# Patient Record
Sex: FEMALE | Race: WHITE | NOT HISPANIC OR LATINO | Employment: UNEMPLOYED | URBAN - METROPOLITAN AREA
[De-identification: names, ages, dates, MRNs, and addresses within clinical notes are randomized per-mention and may not be internally consistent; named-entity substitution may affect disease eponyms.]

---

## 2023-03-06 ENCOUNTER — APPOINTMENT (OUTPATIENT)
Dept: CT IMAGING | Facility: HOSPITAL | Age: 28
End: 2023-03-06
Payer: MEDICAID

## 2023-03-06 ENCOUNTER — APPOINTMENT (OUTPATIENT)
Dept: GENERAL RADIOLOGY | Facility: HOSPITAL | Age: 28
End: 2023-03-06
Payer: MEDICAID

## 2023-03-06 ENCOUNTER — HOSPITAL ENCOUNTER (INPATIENT)
Facility: HOSPITAL | Age: 28
LOS: 1 days | Discharge: LEFT BEFORE DISCHARGE | End: 2023-03-07
Attending: EMERGENCY MEDICINE | Admitting: INTERNAL MEDICINE
Payer: MEDICAID

## 2023-03-06 DIAGNOSIS — B19.20 HEPATITIS C VIRUS INFECTION WITHOUT HEPATIC COMA, UNSPECIFIED CHRONICITY: ICD-10-CM

## 2023-03-06 DIAGNOSIS — E87.1 ACUTE HYPONATREMIA: ICD-10-CM

## 2023-03-06 DIAGNOSIS — F19.10 IV DRUG ABUSE: ICD-10-CM

## 2023-03-06 DIAGNOSIS — N30.01 ACUTE CYSTITIS WITH HEMATURIA: ICD-10-CM

## 2023-03-06 DIAGNOSIS — R41.82 ALTERED MENTAL STATUS, UNSPECIFIED ALTERED MENTAL STATUS TYPE: Primary | ICD-10-CM

## 2023-03-06 DIAGNOSIS — E83.42 HYPOMAGNESEMIA: ICD-10-CM

## 2023-03-06 DIAGNOSIS — A41.9 SEPSIS, DUE TO UNSPECIFIED ORGANISM, UNSPECIFIED WHETHER ACUTE ORGAN DYSFUNCTION PRESENT: ICD-10-CM

## 2023-03-06 PROBLEM — R65.20 SEVERE SEPSIS: Status: ACTIVE | Noted: 2023-03-06

## 2023-03-06 LAB
ACETONE BLD QL: NEGATIVE
ALBUMIN SERPL-MCNC: 3.5 G/DL (ref 3.5–5.2)
ALBUMIN/GLOB SERPL: 1 G/DL
ALP SERPL-CCNC: 135 U/L (ref 39–117)
ALT SERPL W P-5'-P-CCNC: 69 U/L (ref 1–33)
AMPHET+METHAMPHET UR QL: POSITIVE
ANION GAP SERPL CALCULATED.3IONS-SCNC: 12 MMOL/L (ref 5–15)
ANISOCYTOSIS BLD QL: ABNORMAL
ARTERIAL PATENCY WRIST A: POSITIVE
AST SERPL-CCNC: 120 U/L (ref 1–32)
ATMOSPHERIC PRESS: ABNORMAL MM[HG]
B PARAPERT DNA SPEC QL NAA+PROBE: NOT DETECTED
B PERT DNA SPEC QL NAA+PROBE: NOT DETECTED
B-HCG UR QL: NEGATIVE
BACTERIA BLD CULT: ABNORMAL
BACTERIA UR QL AUTO: ABNORMAL /HPF
BARBITURATES UR QL SCN: NEGATIVE
BASE EXCESS BLDA CALC-SCNC: -0.8 MMOL/L (ref 0–3)
BDY SITE: ABNORMAL
BENZODIAZ UR QL SCN: NEGATIVE
BILIRUB SERPL-MCNC: 0.9 MG/DL (ref 0–1.2)
BILIRUB UR QL STRIP: ABNORMAL
BOTTLE TYPE: ABNORMAL
BUN SERPL-MCNC: 20 MG/DL (ref 6–20)
BUN/CREAT SERPL: 17.5 (ref 7–25)
C PNEUM DNA NPH QL NAA+NON-PROBE: NOT DETECTED
CALCIUM SPEC-SCNC: 8.3 MG/DL (ref 8.6–10.5)
CANNABINOIDS SERPL QL: POSITIVE
CHLORIDE SERPL-SCNC: 91 MMOL/L (ref 98–107)
CLARITY UR: ABNORMAL
CO2 BLDA-SCNC: 24.2 MMOL/L (ref 22–29)
CO2 SERPL-SCNC: 25 MMOL/L (ref 22–29)
COCAINE UR QL: NEGATIVE
COLOR UR: ABNORMAL
CREAT SERPL-MCNC: 1.14 MG/DL (ref 0.57–1)
D-LACTATE SERPL-SCNC: 1.4 MMOL/L (ref 0.5–2)
D-LACTATE SERPL-SCNC: 2.1 MMOL/L (ref 0.3–2)
DEPRECATED RDW RBC AUTO: 49.4 FL (ref 37–54)
EGFRCR SERPLBLD CKD-EPI 2021: 67.8 ML/MIN/1.73
ERYTHROCYTE [DISTWIDTH] IN BLOOD BY AUTOMATED COUNT: 16.2 % (ref 12.3–15.4)
ETHANOL UR QL: <0.01 %
FLUAV SUBTYP SPEC NAA+PROBE: NOT DETECTED
FLUBV RNA ISLT QL NAA+PROBE: NOT DETECTED
GEN 5 2HR TROPONIN T REFLEX: 12 NG/L
GLOBULIN UR ELPH-MCNC: 3.5 GM/DL
GLUCOSE BLDC GLUCOMTR-MCNC: 111 MG/DL (ref 70–105)
GLUCOSE SERPL-MCNC: 111 MG/DL (ref 65–99)
GLUCOSE UR STRIP-MCNC: NEGATIVE MG/DL
GRAN CASTS URNS QL MICRO: ABNORMAL /LPF
HADV DNA SPEC NAA+PROBE: NOT DETECTED
HCO3 BLDA-SCNC: 23.1 MMOL/L (ref 21–28)
HCOV 229E RNA SPEC QL NAA+PROBE: NOT DETECTED
HCOV HKU1 RNA SPEC QL NAA+PROBE: NOT DETECTED
HCOV NL63 RNA SPEC QL NAA+PROBE: NOT DETECTED
HCOV OC43 RNA SPEC QL NAA+PROBE: NOT DETECTED
HCT VFR BLD AUTO: 43.5 % (ref 34–46.6)
HEMODILUTION: NO
HGB BLD-MCNC: 14.2 G/DL (ref 12–15.9)
HGB UR QL STRIP.AUTO: ABNORMAL
HMPV RNA NPH QL NAA+NON-PROBE: NOT DETECTED
HOLD SPECIMEN: NORMAL
HOLD SPECIMEN: NORMAL
HPIV1 RNA ISLT QL NAA+PROBE: NOT DETECTED
HPIV2 RNA SPEC QL NAA+PROBE: NOT DETECTED
HPIV3 RNA NPH QL NAA+PROBE: NOT DETECTED
HPIV4 P GENE NPH QL NAA+PROBE: NOT DETECTED
HYALINE CASTS UR QL AUTO: ABNORMAL /LPF
INHALED O2 CONCENTRATION: 21 %
KETONES UR QL STRIP: NEGATIVE
LEUKOCYTE ESTERASE UR QL STRIP.AUTO: ABNORMAL
LYMPHOCYTES # BLD MANUAL: 0.28 10*3/MM3 (ref 0.7–3.1)
M PNEUMO IGG SER IA-ACNC: NOT DETECTED
MAGNESIUM SERPL-MCNC: 1.3 MG/DL (ref 1.6–2.6)
MCH RBC QN AUTO: 29 PG (ref 26.6–33)
MCHC RBC AUTO-ENTMCNC: 32.7 G/DL (ref 31.5–35.7)
MCV RBC AUTO: 88.7 FL (ref 79–97)
METAMYELOCYTES NFR BLD MANUAL: 19 % (ref 0–0)
METHADONE UR QL SCN: NEGATIVE
MODALITY: ABNORMAL
MRSA DNA SPEC QL NAA+PROBE: ABNORMAL
NEUTROPHILS # BLD AUTO: 7.18 10*3/MM3 (ref 1.7–7)
NEUTROPHILS NFR BLD MANUAL: 51 % (ref 42.7–76)
NEUTS BAND NFR BLD MANUAL: 27 % (ref 0–5)
NEUTS VAC BLD QL SMEAR: ABNORMAL
NITRITE UR QL STRIP: POSITIVE
OPIATES UR QL: NEGATIVE
OXYCODONE UR QL SCN: NEGATIVE
PATHOLOGY REVIEW: YES
PCO2 BLDA: 35.2 MM HG (ref 35–48)
PH BLDA: 7.42 PH UNITS (ref 7.35–7.45)
PH UR STRIP.AUTO: 6 [PH] (ref 5–8)
PHOSPHATE SERPL-MCNC: 2.6 MG/DL (ref 2.5–4.5)
PLATELET # BLD AUTO: 93 10*3/MM3 (ref 140–450)
PMV BLD AUTO: 9.6 FL (ref 6–12)
PO2 BLDA: 21.4 MM HG (ref 83–108)
POTASSIUM SERPL-SCNC: 3.1 MMOL/L (ref 3.5–5.2)
PROCALCITONIN SERPL-MCNC: 4.81 NG/ML (ref 0–0.25)
PROCALCITONIN SERPL-MCNC: 6.33 NG/ML (ref 0–0.25)
PROT SERPL-MCNC: 7 G/DL (ref 6–8.5)
PROT UR QL STRIP: ABNORMAL
RBC # BLD AUTO: 4.91 10*6/MM3 (ref 3.77–5.28)
RBC # UR STRIP: ABNORMAL /HPF
REF LAB TEST METHOD: ABNORMAL
RHINOVIRUS RNA SPEC NAA+PROBE: NOT DETECTED
RSV RNA NPH QL NAA+NON-PROBE: NOT DETECTED
SAO2 % BLDCOA: 37.6 % (ref 94–98)
SARS-COV-2 RNA NPH QL NAA+NON-PROBE: NOT DETECTED
SCAN SLIDE: NORMAL
SMALL PLATELETS BLD QL SMEAR: ABNORMAL
SODIUM SERPL-SCNC: 128 MMOL/L (ref 136–145)
SP GR UR STRIP: 1.02 (ref 1–1.03)
SQUAMOUS #/AREA URNS HPF: ABNORMAL /HPF
TOXIC GRANULATION: ABNORMAL
TROPONIN T DELTA: -5 NG/L
TROPONIN T SERPL HS-MCNC: 17 NG/L
UROBILINOGEN UR QL STRIP: ABNORMAL
VARIANT LYMPHS NFR BLD MANUAL: 3 % (ref 19.6–45.3)
WBC # UR STRIP: ABNORMAL /HPF
WBC NRBC COR # BLD: 9.2 10*3/MM3 (ref 3.4–10.8)
WHOLE BLOOD HOLD COAG: NORMAL
WHOLE BLOOD HOLD SPECIMEN: NORMAL
YEAST URNS QL MICRO: ABNORMAL /HPF

## 2023-03-06 PROCEDURE — 80053 COMPREHEN METABOLIC PANEL: CPT | Performed by: NURSE PRACTITIONER

## 2023-03-06 PROCEDURE — 0 POTASSIUM CHLORIDE 10 MEQ/100ML SOLUTION: Performed by: NURSE PRACTITIONER

## 2023-03-06 PROCEDURE — 84484 ASSAY OF TROPONIN QUANT: CPT | Performed by: NURSE PRACTITIONER

## 2023-03-06 PROCEDURE — 99285 EMERGENCY DEPT VISIT HI MDM: CPT

## 2023-03-06 PROCEDURE — 85007 BL SMEAR W/DIFF WBC COUNT: CPT | Performed by: NURSE PRACTITIONER

## 2023-03-06 PROCEDURE — 85025 COMPLETE CBC W/AUTO DIFF WBC: CPT | Performed by: NURSE PRACTITIONER

## 2023-03-06 PROCEDURE — 70496 CT ANGIOGRAPHY HEAD: CPT

## 2023-03-06 PROCEDURE — 25010000002 CEFEPIME PER 500 MG: Performed by: INTERNAL MEDICINE

## 2023-03-06 PROCEDURE — 81025 URINE PREGNANCY TEST: CPT | Performed by: NURSE PRACTITIONER

## 2023-03-06 PROCEDURE — 80307 DRUG TEST PRSMV CHEM ANLYZR: CPT | Performed by: NURSE PRACTITIONER

## 2023-03-06 PROCEDURE — 93005 ELECTROCARDIOGRAM TRACING: CPT | Performed by: NURSE PRACTITIONER

## 2023-03-06 PROCEDURE — 25010000002 CEFTRIAXONE PER 250 MG: Performed by: NURSE PRACTITIONER

## 2023-03-06 PROCEDURE — 74177 CT ABD & PELVIS W/CONTRAST: CPT

## 2023-03-06 PROCEDURE — 25010000002 MIDAZOLAM PER 1 MG: Performed by: NURSE PRACTITIONER

## 2023-03-06 PROCEDURE — 25510000001 IOPAMIDOL PER 1 ML: Performed by: EMERGENCY MEDICINE

## 2023-03-06 PROCEDURE — 25010000002 VANCOMYCIN HCL 1.25 G RECONSTITUTED SOLUTION 1 EACH VIAL: Performed by: NURSE PRACTITIONER

## 2023-03-06 PROCEDURE — 25010000002 ENOXAPARIN PER 10 MG: Performed by: INTERNAL MEDICINE

## 2023-03-06 PROCEDURE — 71045 X-RAY EXAM CHEST 1 VIEW: CPT

## 2023-03-06 PROCEDURE — P9612 CATHETERIZE FOR URINE SPEC: HCPCS

## 2023-03-06 PROCEDURE — 83735 ASSAY OF MAGNESIUM: CPT | Performed by: NURSE PRACTITIONER

## 2023-03-06 PROCEDURE — 82009 KETONE BODYS QUAL: CPT | Performed by: NURSE PRACTITIONER

## 2023-03-06 PROCEDURE — 84145 PROCALCITONIN (PCT): CPT | Performed by: NURSE PRACTITIONER

## 2023-03-06 PROCEDURE — 87086 URINE CULTURE/COLONY COUNT: CPT | Performed by: NURSE PRACTITIONER

## 2023-03-06 PROCEDURE — 83605 ASSAY OF LACTIC ACID: CPT

## 2023-03-06 PROCEDURE — 36600 WITHDRAWAL OF ARTERIAL BLOOD: CPT

## 2023-03-06 PROCEDURE — 84145 PROCALCITONIN (PCT): CPT | Performed by: INTERNAL MEDICINE

## 2023-03-06 PROCEDURE — 87147 CULTURE TYPE IMMUNOLOGIC: CPT | Performed by: NURSE PRACTITIONER

## 2023-03-06 PROCEDURE — 87641 MR-STAPH DNA AMP PROBE: CPT | Performed by: INTERNAL MEDICINE

## 2023-03-06 PROCEDURE — 81001 URINALYSIS AUTO W/SCOPE: CPT | Performed by: NURSE PRACTITIONER

## 2023-03-06 PROCEDURE — 70470 CT HEAD/BRAIN W/O & W/DYE: CPT

## 2023-03-06 PROCEDURE — 87077 CULTURE AEROBIC IDENTIFY: CPT | Performed by: NURSE PRACTITIONER

## 2023-03-06 PROCEDURE — 87186 SC STD MICRODIL/AGAR DIL: CPT | Performed by: NURSE PRACTITIONER

## 2023-03-06 PROCEDURE — 25010000002 MAGNESIUM SULFATE 2 GM/50ML SOLUTION: Performed by: NURSE PRACTITIONER

## 2023-03-06 PROCEDURE — 84100 ASSAY OF PHOSPHORUS: CPT | Performed by: NURSE PRACTITIONER

## 2023-03-06 PROCEDURE — 87150 DNA/RNA AMPLIFIED PROBE: CPT | Performed by: NURSE PRACTITIONER

## 2023-03-06 PROCEDURE — 0202U NFCT DS 22 TRGT SARS-COV-2: CPT | Performed by: NURSE PRACTITIONER

## 2023-03-06 PROCEDURE — 82962 GLUCOSE BLOOD TEST: CPT

## 2023-03-06 PROCEDURE — 82077 ASSAY SPEC XCP UR&BREATH IA: CPT | Performed by: NURSE PRACTITIONER

## 2023-03-06 PROCEDURE — 82803 BLOOD GASES ANY COMBINATION: CPT

## 2023-03-06 RX ORDER — POTASSIUM CHLORIDE 7.45 MG/ML
10 INJECTION INTRAVENOUS
Status: DISCONTINUED | OUTPATIENT
Start: 2023-03-06 | End: 2023-03-07 | Stop reason: HOSPADM

## 2023-03-06 RX ORDER — MAGNESIUM SULFATE HEPTAHYDRATE 40 MG/ML
2 INJECTION, SOLUTION INTRAVENOUS AS NEEDED
Status: DISCONTINUED | OUTPATIENT
Start: 2023-03-06 | End: 2023-03-07 | Stop reason: HOSPADM

## 2023-03-06 RX ORDER — ACETAMINOPHEN 650 MG/1
650 SUPPOSITORY RECTAL EVERY 4 HOURS PRN
Status: DISCONTINUED | OUTPATIENT
Start: 2023-03-06 | End: 2023-03-07 | Stop reason: HOSPADM

## 2023-03-06 RX ORDER — ONDANSETRON 4 MG/1
4 TABLET, FILM COATED ORAL EVERY 6 HOURS PRN
Status: DISCONTINUED | OUTPATIENT
Start: 2023-03-06 | End: 2023-03-07 | Stop reason: HOSPADM

## 2023-03-06 RX ORDER — ACETAMINOPHEN 160 MG/5ML
650 SOLUTION ORAL EVERY 4 HOURS PRN
Status: DISCONTINUED | OUTPATIENT
Start: 2023-03-06 | End: 2023-03-07 | Stop reason: HOSPADM

## 2023-03-06 RX ORDER — ONDANSETRON 2 MG/ML
4 INJECTION INTRAMUSCULAR; INTRAVENOUS EVERY 6 HOURS PRN
Status: DISCONTINUED | OUTPATIENT
Start: 2023-03-06 | End: 2023-03-07 | Stop reason: HOSPADM

## 2023-03-06 RX ORDER — MAGNESIUM SULFATE HEPTAHYDRATE 40 MG/ML
4 INJECTION, SOLUTION INTRAVENOUS AS NEEDED
Status: DISCONTINUED | OUTPATIENT
Start: 2023-03-06 | End: 2023-03-07 | Stop reason: HOSPADM

## 2023-03-06 RX ORDER — POLYETHYLENE GLYCOL 3350 17 G/17G
17 POWDER, FOR SOLUTION ORAL DAILY PRN
Status: DISCONTINUED | OUTPATIENT
Start: 2023-03-06 | End: 2023-03-07 | Stop reason: HOSPADM

## 2023-03-06 RX ORDER — BISACODYL 10 MG
10 SUPPOSITORY, RECTAL RECTAL DAILY PRN
Status: DISCONTINUED | OUTPATIENT
Start: 2023-03-06 | End: 2023-03-07 | Stop reason: HOSPADM

## 2023-03-06 RX ORDER — SODIUM CHLORIDE 0.9 % (FLUSH) 0.9 %
10 SYRINGE (ML) INJECTION AS NEEDED
Status: DISCONTINUED | OUTPATIENT
Start: 2023-03-06 | End: 2023-03-07 | Stop reason: HOSPADM

## 2023-03-06 RX ORDER — ACETAMINOPHEN 650 MG/1
975 SUPPOSITORY RECTAL ONCE
Status: COMPLETED | OUTPATIENT
Start: 2023-03-06 | End: 2023-03-06

## 2023-03-06 RX ORDER — SODIUM CHLORIDE 0.9 % (FLUSH) 0.9 %
10 SYRINGE (ML) INJECTION EVERY 12 HOURS SCHEDULED
Status: DISCONTINUED | OUTPATIENT
Start: 2023-03-06 | End: 2023-03-07 | Stop reason: HOSPADM

## 2023-03-06 RX ORDER — SODIUM CHLORIDE 9 MG/ML
40 INJECTION, SOLUTION INTRAVENOUS AS NEEDED
Status: DISCONTINUED | OUTPATIENT
Start: 2023-03-06 | End: 2023-03-07 | Stop reason: HOSPADM

## 2023-03-06 RX ORDER — BISACODYL 5 MG/1
5 TABLET, DELAYED RELEASE ORAL DAILY PRN
Status: DISCONTINUED | OUTPATIENT
Start: 2023-03-06 | End: 2023-03-07 | Stop reason: HOSPADM

## 2023-03-06 RX ORDER — AMOXICILLIN 250 MG
2 CAPSULE ORAL 2 TIMES DAILY
Status: DISCONTINUED | OUTPATIENT
Start: 2023-03-06 | End: 2023-03-07 | Stop reason: HOSPADM

## 2023-03-06 RX ORDER — ENOXAPARIN SODIUM 100 MG/ML
40 INJECTION SUBCUTANEOUS DAILY
Status: DISCONTINUED | OUTPATIENT
Start: 2023-03-06 | End: 2023-03-07 | Stop reason: HOSPADM

## 2023-03-06 RX ORDER — MIDAZOLAM HYDROCHLORIDE 1 MG/ML
2 INJECTION INTRAMUSCULAR; INTRAVENOUS ONCE
Status: COMPLETED | OUTPATIENT
Start: 2023-03-06 | End: 2023-03-06

## 2023-03-06 RX ORDER — CHOLECALCIFEROL (VITAMIN D3) 125 MCG
5 CAPSULE ORAL NIGHTLY PRN
Status: DISCONTINUED | OUTPATIENT
Start: 2023-03-06 | End: 2023-03-07 | Stop reason: HOSPADM

## 2023-03-06 RX ORDER — ACETAMINOPHEN 325 MG/1
650 TABLET ORAL EVERY 4 HOURS PRN
Status: DISCONTINUED | OUTPATIENT
Start: 2023-03-06 | End: 2023-03-07 | Stop reason: HOSPADM

## 2023-03-06 RX ADMIN — ENOXAPARIN SODIUM 40 MG: 100 INJECTION SUBCUTANEOUS at 21:29

## 2023-03-06 RX ADMIN — SODIUM CHLORIDE 1000 ML: 9 INJECTION, SOLUTION INTRAVENOUS at 18:02

## 2023-03-06 RX ADMIN — POTASSIUM CHLORIDE 10 MEQ: 7.46 INJECTION, SOLUTION INTRAVENOUS at 19:30

## 2023-03-06 RX ADMIN — SODIUM CHLORIDE 1000 ML: 9 INJECTION, SOLUTION INTRAVENOUS at 12:13

## 2023-03-06 RX ADMIN — CEFEPIME 2 G: 2 INJECTION, POWDER, FOR SOLUTION INTRAVENOUS at 21:29

## 2023-03-06 RX ADMIN — ACETAMINOPHEN 975 MG: 650 SUPPOSITORY RECTAL at 15:51

## 2023-03-06 RX ADMIN — VANCOMYCIN HYDROCHLORIDE 1250 MG: 1.25 INJECTION, POWDER, LYOPHILIZED, FOR SOLUTION INTRAVENOUS at 16:58

## 2023-03-06 RX ADMIN — MIDAZOLAM 2 MG: 1 INJECTION INTRAMUSCULAR; INTRAVENOUS at 14:23

## 2023-03-06 RX ADMIN — POTASSIUM CHLORIDE 10 MEQ: 7.46 INJECTION, SOLUTION INTRAVENOUS at 15:16

## 2023-03-06 RX ADMIN — CEFTRIAXONE 1 G: 1 INJECTION, POWDER, FOR SOLUTION INTRAMUSCULAR; INTRAVENOUS at 14:41

## 2023-03-06 RX ADMIN — MAGNESIUM SULFATE HEPTAHYDRATE 2 G: 2 INJECTION, SOLUTION INTRAVENOUS at 15:16

## 2023-03-06 RX ADMIN — MAGNESIUM SULFATE HEPTAHYDRATE 2 G: 2 INJECTION, SOLUTION INTRAVENOUS at 19:30

## 2023-03-06 RX ADMIN — Medication 10 ML: at 21:29

## 2023-03-06 RX ADMIN — SODIUM CHLORIDE, POTASSIUM CHLORIDE, SODIUM LACTATE AND CALCIUM CHLORIDE 1000 ML: 600; 310; 30; 20 INJECTION, SOLUTION INTRAVENOUS at 15:24

## 2023-03-06 RX ADMIN — IOPAMIDOL 100 ML: 755 INJECTION, SOLUTION INTRAVENOUS at 14:47

## 2023-03-06 NOTE — ED PROVIDER NOTES
Subjective   History of Present Illness  27-year-old female presents with 2-day history of nausea vomiting diarrhea.  She reports she is a type I diabetic.  She reports compliance with her insulin.  Patient is uncooperative, tearful.     History provided by:  Relative (Cousin at bedside)  History limited by:  Mental status change   used: No        Review of Systems    Past Medical History:   Diagnosis Date   • ADHD    • Anxiety    • Asymptomatic bacteriuria    • Bipolar 1 disorder (HCC)    • Candida esophagitis (HCC)    • Depression    • Endocarditis    • Hepatitis C    • Hypoalbuminemia    • Hypomagnesemia    • IV drug user    • Mycotic aneurysm (HCC)    • Normocytic anemia     secondary to chronic inflammation   • Thrombocytopenia (HCC)     at [facility] - resolved       No Known Allergies    No past surgical history on file.    No family history on file.    Social History     Socioeconomic History   • Marital status: Single           Objective   Physical Exam  Vitals and nursing note reviewed.   Constitutional:       General: She is awake. She is not in acute distress.     Appearance: Normal appearance. She is well-developed. She is obese. She is ill-appearing. She is not toxic-appearing or diaphoretic.   HENT:      Head: Normocephalic and atraumatic.      Nose: Nose normal. No rhinorrhea.      Mouth/Throat:      Mouth: Mucous membranes are moist.      Pharynx: Oropharynx is clear.   Eyes:      Extraocular Movements: Extraocular movements intact.      Conjunctiva/sclera: Conjunctivae normal.      Pupils: Pupils are equal, round, and reactive to light.   Cardiovascular:      Rate and Rhythm: Regular rhythm. Tachycardia present.      Pulses: Normal pulses.      Heart sounds: Normal heart sounds, S1 normal and S2 normal. Heart sounds not distant. No murmur heard.    No friction rub. No gallop.   Pulmonary:      Effort: Pulmonary effort is normal. No respiratory distress.      Breath sounds: Normal  "breath sounds and air entry. No wheezing or rales.   Abdominal:      General: Abdomen is flat. Bowel sounds are normal. There is no distension.      Palpations: Abdomen is soft. Abdomen is not rigid. There is no mass.      Tenderness: There is generalized abdominal tenderness. There is guarding. There is no rebound. Negative signs include Wyman's sign and McBurney's sign.      Hernia: No hernia is present.   Skin:     General: Skin is warm and dry.      Capillary Refill: Capillary refill takes less than 2 seconds.      Coloration: Skin is not jaundiced or pale.      Findings: Lesion (to the left of the umbilicus) present. No bruising, erythema or rash.   Neurological:      Mental Status: She is alert. She is disoriented.      GCS: GCS eye subscore is 4. GCS verbal subscore is 4. GCS motor subscore is 5.      Motor: Motor function is intact.      Comments: GCS: 13    Patient is able to verbalize her name and date of birth, unable to determine place and time.   Psychiatric:         Attention and Perception: She is inattentive.         Mood and Affect: Mood is anxious. Affect is tearful and inappropriate (Stating \"I want my mommy\").         Behavior: Behavior is uncooperative, agitated and withdrawn.         Thought Content: Thought content normal.         Cognition and Memory: Cognition is impaired. Memory is impaired.         Judgment: Judgment is impulsive.         Procedures     My EKG interpretation: Sinus tachycardia with ventricular trigeminy RVH secondary to repolarization abnormality rate 126.  No previous available for comparison.  Reviewed by my attending, Dr. Fu.      ED Course  ED Course as of 03/06/23 1741   Mon Mar 06, 2023   1551 Awaiting CTA results. [AL]      ED Course User Index  [AL] Madeline An, APRN                                           Medical Decision Making  Acute cystitis with hematuria: acute illness or injury  Acute hyponatremia: acute illness or injury  Altered mental status, " unspecified altered mental status type: acute illness or injury  Hepatitis C virus infection without hepatic coma, unspecified chronicity: chronic illness or injury  Hypomagnesemia: acute illness or injury  IV drug abuse (HCC): acute illness or injury  Sepsis, due to unspecified organism, unspecified whether acute organ dysfunction present (HCC): acute illness or injury  Amount and/or Complexity of Data Reviewed  External Data Reviewed: notes.     Details: The following is from [facility] in [place]:    Patient had presented to the hospital for altered mental status.  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  She was then upgraded to ICU for hypoxia.  She had an angiogram which showed a distal MCA mycotic aneurysm.  CT of the chest abdomen pelvis showed nodular foci with cavitation representing septic emboli as well as splenomegaly with hypoattenuation possible infarct, renal septic emboli Silvio cystitis of the hip girdle musculature, pyomyositis, tenosynovitis of the iliopsoas on the right.  Transthoracic echo showed EF of 60 to 65% with a mitral valve mobile density suspicious for vegetation and tricuspid and pulmonary valves not visualized.  Is also noted to have moderate pulmonary hypertension WILD revealed mitral valve Kenvir with severe regurg.  There was a large mobile echodensity on the tricuspid valve with moderate tricuspid regurgitation.  She was found to have bilateral pleural effusions left greater than right concerning for empyema.  This was drained by IR.  She was found to have Candida esophagitis.    Patient had ID consult was started on oxacillin, fluconazole, azithromycin, and heparin.  Blood cultures were found to be positive for Staph aureus.  Labs: ordered. Decision-making details documented in ED Course.  Radiology: ordered. Decision-making details documented in ED Course.  ECG/medicine tests: ordered. Decision-making details  "documented in ED Course.      Risk  OTC drugs.  Prescription drug management.      Critical Care  Total time providing critical care: minutes (60 minutes)    Interpreted by radiologist as below:     CT Head With & Without Contrast    Result Date: 3/6/2023  Impression: 1. No acute intracranial findings. 2. Presumed embolization material within the high left frontal lobe cortical/subcortical distribution. Electronically Signed: Kate Maradiagaguido  3/6/2023 2:58 PM EST  Workstation ID: [id number]    CT Abdomen Pelvis With Contrast    Result Date: 3/6/2023  1. No obstructive uropathy or CT findings of pyelonephritis 2. Indeterminate low-attenuation foci in the dome of the liver. It's unclear if these structures are related to the portal triads and possibly represent peripheral portal vein thrombosis or dilated peripheral ducts. Contrast enhanced MRI may be clarifying Electronically Signed: Dakota Norris  3/6/2023 3:11 PM EST  Workstation ID: [id number]    XR Chest AP    Result Date: 3/6/2023  Impression: 1.Evidence for prior cardiothoracic surgery. 2.Slight prominence of pulmonary interstitial markings that could relate to shadows from pulmonary vessels accentuated secondary to technique. Electronically Signed: Steve Bhavani  3/6/2023 2:01 PM EST  Workstation ID: [id number]        /63   Pulse (!) 131   Temp (!) 103.1 °F (39.5 °C) (Rectal)   Resp 18   Ht 165.1 cm (65\")   Wt 62 kg (136 lb 11 oz)   LMP  (LMP Unknown)   SpO2 98%   BMI 22.75 kg/m²      Lab Results (last 24 hours)     Procedure Component Value Units Date/Time    POC Glucose Once [[id number]]  (Abnormal) Collected: 03/06/23 1146    Specimen: Blood Updated: 03/06/23 1148     Glucose 111 mg/dL      Comment: Serial Number: [id number]:  [id number]       CBC & Differential [[id number]]  (Abnormal) Collected: 03/06/23 1148    Specimen: Blood from Arm, Left Updated: 03/06/23 1249    Narrative:      The following orders were created for panel order CBC & " Differential.  Procedure                               Abnormality         Status                     ---------                               -----------         ------                     CBC Auto Differential[[id number]]        Abnormal            Final result               Scan Slide[[id number]]                                       Final result                 Please view results for these tests on the individual orders.    Comprehensive Metabolic Panel [[id number]]  (Abnormal) Collected: 03/06/23 1148    Specimen: Blood from Arm, Left Updated: 03/06/23 1224     Glucose 111 mg/dL      BUN 20 mg/dL      Creatinine 1.14 mg/dL      Sodium 128 mmol/L      Potassium 3.1 mmol/L      Chloride 91 mmol/L      CO2 25.0 mmol/L      Calcium 8.3 mg/dL      Total Protein 7.0 g/dL      Albumin 3.5 g/dL      ALT (SGPT) 69 U/L      AST (SGOT) 120 U/L      Alkaline Phosphatase 135 U/L      Total Bilirubin 0.9 mg/dL      Globulin 3.5 gm/dL      A/G Ratio 1.0 g/dL      BUN/Creatinine Ratio 17.5     Anion Gap 12.0 mmol/L      eGFR 67.8 mL/min/1.73     Narrative:      GFR Normal >60  Chronic Kidney Disease <60  Kidney Failure <15      Magnesium [[id number]]  (Abnormal) Collected: 03/06/23 1148    Specimen: Blood from Arm, Left Updated: 03/06/23 1224     Magnesium 1.3 mg/dL     Phosphorus [[id number]]  (Normal) Collected: 03/06/23 1148    Specimen: Blood from Arm, Left Updated: 03/06/23 1224     Phosphorus 2.6 mg/dL     Ketone Bodies, Serum (Not performed at [place]) [[id number]]  (Normal) Collected: 03/06/23 1148    Specimen: Blood from Arm, Left Updated: 03/06/23 1302    Narrative:      The following orders were created for panel order Ketone Bodies, Serum (Not performed at [place]).  Procedure                               Abnormality         Status                     ---------                               -----------         ------                     Acetone[[id number]]                      Normal              Final result                  Please view results for these tests on the individual orders.    CBC Auto Differential [[id number]]  (Abnormal) Collected: 03/06/23 1148    Specimen: Blood from Arm, Left Updated: 03/06/23 1249     WBC 9.20 10*3/mm3      RBC 4.91 10*6/mm3      Hemoglobin 14.2 g/dL      Hematocrit 43.5 %      MCV 88.7 fL      MCH 29.0 pg      MCHC 32.7 g/dL      RDW 16.2 %      RDW-SD 49.4 fl      MPV 9.6 fL      Platelets 93 10*3/mm3     Narrative:      The previously reported component NRBC is no longer being reported. Previous result was 0.1 /100 WBC (Reference Range: 0.0-0.2 /100 WBC) on 3/6/2023 at 1220 EST.    Acetone [[id number]]  (Normal) Collected: 03/06/23 1148    Specimen: Blood from Arm, Left Updated: 03/06/23 1302     Acetone Negative    High Sensitivity Troponin T [[id number]]  (Abnormal) Collected: 03/06/23 1148    Specimen: Blood from Arm, Left Updated: 03/06/23 1224     HS Troponin T 17 ng/L     Narrative:      High Sensitive Troponin T Reference Range:  <10.0 ng/L- Negative Female for AMI  <15.0 ng/L- Negative Male for AMI  >=10 - Abnormal Female indicating possible myocardial injury.  >=15 - Abnormal Male indicating possible myocardial injury.   Clinicians would have to utilize clinical acumen, EKG, Troponin, and serial changes to determine if it is an Acute Myocardial Infarction or myocardial injury due to an underlying chronic condition.         Procalcitonin [[id number]]  (Abnormal) Collected: 03/06/23 1148    Specimen: Blood from Arm, Left Updated: 03/06/23 1229     Procalcitonin 6.33 ng/mL     Narrative:      As a Marker for Sepsis (Non-Neonates):    1. <0.5 ng/mL represents a low risk of severe sepsis and/or septic shock.  2. >2 ng/mL represents a high risk of severe sepsis and/or septic shock.    As a Marker for Lower Respiratory Tract Infections that require antibiotic therapy:    PCT on Admission    Antibiotic Therapy       6-12 Hrs later    >0.5                Strongly Recommended  >0.25 - <0.5         "Recommended   0.1 - 0.25          Discouraged              Remeasure/reassess PCT  <0.1                Strongly Discouraged     Remeasure/reassess PCT    As 28 day mortality risk marker: \"Change in Procalcitonin Result\" (>80% or <=80%) if Day 0 (or Day 1) and Day 4 values are available. Refer to [email or web address]-[email or web address]    Change in PCT <=80%  A decrease of PCT levels below or equal to 80% defines a positive change in PCT test result representing a higher risk for 28-day all-cause mortality of patients diagnosed with severe sepsis for septic shock.    Change in PCT >80%  A decrease of PCT levels of more than 80% defines a negative change in PCT result representing a lower risk for 28-day all-cause mortality of patients diagnosed with severe sepsis or septic shock.       Blood Culture - Blood, Arm, Left [[id number]] Collected: 03/06/23 1148    Specimen: Blood from Arm, Left Updated: 03/06/23 1154    Scan Slide [[id number]] Collected: 03/06/23 1148    Specimen: Blood from Arm, Left Updated: 03/06/23 1249     Scan Slide --     Comment: See Manual Differential Results       Manual Differential [[id number]]  (Abnormal) Collected: 03/06/23 1148    Specimen: Blood from Arm, Left Updated: 03/06/23 1249     Neutrophil % 51.0 %      Lymphocyte % 3.0 %      Bands %  27.0 %      Metamyelocyte % 19.0 %      Neutrophils Absolute 7.18 10*3/mm3      Lymphocytes Absolute 0.28 10*3/mm3      Anisocytosis Slight/1+     Toxic Granulation Slight/1+     Vacuolated Neutrophils Slight/1+     Platelet Estimate Decreased    Path Consult Reflex [[id number]] Collected: 03/06/23 1148    Specimen: Blood from Arm, Left Updated: 03/06/23 1249     Pathology Review Yes    Respiratory Panel PCR w/COVID-19(SARS-CoV-2) JOSEPH/HALEY/TRISHA/PAD/COR/MAD/RYAN In-House, NP Swab in UTM/VTM, 3-4 HR TAT - Swab, Nasopharynx [[id number]]  (Normal) Collected: 03/06/23 1151    Specimen: Swab from Nasopharynx Updated: 03/06/23 1247     ADENOVIRUS, PCR Not Detected     " Coronavirus 229E Not Detected     Coronavirus HKU1 Not Detected     Coronavirus NL63 Not Detected     Coronavirus OC43 Not Detected     COVID19 Not Detected     Human Metapneumovirus Not Detected     Human Rhinovirus/Enterovirus Not Detected     Influenza A PCR Not Detected     Influenza B PCR Not Detected     Parainfluenza Virus 1 Not Detected     Parainfluenza Virus 2 Not Detected     Parainfluenza Virus 3 Not Detected     Parainfluenza Virus 4 Not Detected     RSV, PCR Not Detected     Bordetella pertussis pcr Not Detected     Bordetella parapertussis PCR Not Detected     Chlamydophila pneumoniae PCR Not Detected     Mycoplasma pneumo by PCR Not Detected    Narrative:      In the setting of a positive respiratory panel with a viral infection PLUS a negative procalcitonin without other underlying concern for bacterial infection, consider observing off antibiotics or discontinuation of antibiotics and continue supportive care. If the respiratory panel is positive for atypical bacterial infection (Bordetella pertussis, Chlamydophila pneumoniae, or Mycoplasma pneumoniae), consider antibiotic de-escalation to target atypical bacterial infection.    Pregnancy, Urine - Straight Cath [[id number]]  (Normal) Collected: 03/06/23 1213    Specimen: Urine from Straight Cath Updated: 03/06/23 1227     HCG, Urine QL Negative    Urinalysis With Microscopic If Indicated (No Culture) - Straight Cath [[id number]]  (Abnormal) Collected: 03/06/23 1214    Specimen: Urine from Straight Cath Updated: 03/06/23 1232     Color, UA Ignacia     Appearance, UA Cloudy     pH, UA 6.0     Specific Gravity, UA 1.020     Glucose, UA Negative     Ketones, UA Negative     Bilirubin, UA Small (1+)     Comment: Confirmation testing is unavailable.  A serum bilirubin is recommended for further assessment.        Blood, UA Large (3+)     Protein,  mg/dL (2+)     Leuk Esterase, UA Moderate (2+)     Nitrite, UA Positive     Urobilinogen, UA 1.0 E.U./dL     Urinalysis, Microscopic Only - Straight Cath [[id number]]  (Abnormal) Collected: 03/06/23 1214    Specimen: Urine from Straight Cath Updated: 03/06/23 1243     RBC, UA 31-50 /HPF      WBC, UA 6-12 /HPF      Bacteria, UA Trace /HPF      Squamous Epithelial Cells, UA 0-2 /HPF      Yeast, UA Moderate/2+ Budding Yeast /HPF      Hyaline Casts, UA None Seen /LPF      Granular Casts, UA 0-2 /LPF      Methodology Manual Light Microscopy    Urine Drug Screen - Urine, Clean Catch [[id number]]  (Abnormal) Collected: 03/06/23 1214    Specimen: Urine, Clean Catch Updated: 03/06/23 1419     Amphet/Methamphet, Screen Positive     Barbiturates Screen, Urine Negative     Benzodiazepine Screen, Urine Negative     Cocaine Screen, Urine Negative     Opiate Screen Negative     THC, Screen, Urine Positive     Methadone Screen, Urine Negative     Oxycodone Screen, Urine Negative    Narrative:      Negative Thresholds Per Drugs Screened:    Amphetamines                 500 ng/ml  Barbiturates                 200 ng/ml  Benzodiazepines              100 ng/ml  Cocaine                      300 ng/ml  Methadone                    300 ng/ml  Opiates                      300 ng/ml  Oxycodone                    100 ng/ml  THC                           50 ng/ml    The Normal Value for all drugs tested is negative. This report includes final unconfirmed screening results to be used for medical treatment purposes only. Unconfirmed results must not be used for non-medical purposes such as employment or legal testing. Clinical consideration should be applied to any drug of abuse test, particularly when unconfirmed results are used.          All urine drugs of abuse requests without chain of custody are for medical screening purposes only.  False positives are possible.      Urine Culture - Urine, Straight Cath [[id number]] Collected: 03/06/23 1214    Specimen: Urine from Straight Cath Updated: 03/06/23 1353    POC Lactate [[id number]]  (Abnormal)  Collected: 03/06/23 1225    Specimen: Blood Updated: 03/06/23 1227     Lactate 2.1 mmol/L      Comment: Serial Number: [id number]:  [id number]       Blood Culture - Blood, Arm, Left [[id number]] Collected: 03/06/23 1231    Specimen: Blood from Arm, Left Updated: 03/06/23 1233    Blood Gas, Arterial - [[id number]]  (Abnormal) Collected: 03/06/23 1231    Specimen: Arterial Blood Updated: 03/06/23 1535     Site Right Radial     Yair's Test Positive     pH, Arterial 7.425 pH units      pCO2, Arterial 35.2 mm Hg      pO2, Arterial 21.4 mm Hg      HCO3, Arterial 23.1 mmol/L      Base Excess, Arterial -0.8 mmol/L      Comment: Serial Number: [id number]:  [id number]        O2 Saturation, Arterial 37.6 %      CO2 Content 24.2 mmol/L      Barometric Pressure for Blood Gas --     Comment: N/A        Modality Room Air     FIO2 21 %      Hemodilution No    High Sensitivity Troponin T 2Hr [[id number]]  (Abnormal) Collected: 03/06/23 1358    Specimen: Blood Updated: 03/06/23 1424     HS Troponin T 12 ng/L      Troponin T Delta -5 ng/L     Narrative:      High Sensitive Troponin T Reference Range:  <10.0 ng/L- Negative Female for AMI  <15.0 ng/L- Negative Male for AMI  >=10 - Abnormal Female indicating possible myocardial injury.  >=15 - Abnormal Male indicating possible myocardial injury.   Clinicians would have to utilize clinical acumen, EKG, Troponin, and serial changes to determine if it is an Acute Myocardial Infarction or myocardial injury due to an underlying chronic condition.         Ethanol [[id number]] Collected: 03/06/23 1358    Specimen: Blood Updated: 03/06/23 1424     Ethanol % <0.010 %     Narrative:      Plasma Ethanol Clinical Symptoms:    ETOH (%)               Clinical Symptom  .01-.05              No apparent influence  .03-.12              Euphoria, Diminished judgment and attention   .09-.25              Impaired comprehension, Muscle incoordination  .18-.30              Confusion, Staggered gait, Slurred  speech  .25-.40              Markedly decreased response to stimuli, unable to stand or                        walk, vomitting, sleep or stupor  .35-.50              Comatose, Anesthesia, Subnormal body temperature        STAT Lactic Acid, Reflex [[id number]]  (Normal) Collected: 03/06/23 1554    Specimen: Blood Updated: 03/06/23 1615     Lactate 1.4 mmol/L            Medications   potassium chloride 10 mEq in 100 mL IVPB (0 mEq Intravenous Stopped 3/6/23 1617)   Magnesium Sulfate - Total Dose 10 grams - Magnesium 1 or Less ( Intravenous Not Given:  See Alt 3/6/23 1712)     Or   Magnesium Sulfate - Total Dose 6 grams - Magnesium 1.1 - 1.5 (0 g Intravenous Stopped 3/6/23 1712)     Or   Magnesium Sulfate - Total Dose 4 grams - Magnesium 1.6 - 1.9 ( Intravenous Not Given:  See Alt 3/6/23 1712)   sodium chloride 0.9 % bolus 1,000 mL (0 mL Intravenous Stopped 3/6/23 1404)   cefTRIAXone (ROCEPHIN) 1 g in sodium chloride 0.9 % 100 mL IVPB (0 g Intravenous Stopped 3/6/23 1511)   midazolam (VERSED) injection 2 mg (2 mg Intravenous Given 3/6/23 1423)   iopamidol (ISOVUE-370) 76 % injection 100 mL (100 mL Intravenous Given 3/6/23 1447)   Vancomycin HCl 1,250 mg in sodium chloride 0.9 % 250 mL IVPB (1,250 mg Intravenous New Bag 3/6/23 1658)   lactated ringers bolus 1,000 mL (0 mL Intravenous Stopped 3/6/23 1712)   acetaminophen (TYLENOL) suppository 975 mg (975 mg Rectal Given 3/6/23 1551)        Patient undressed and placed in gown for exam.  Appropriate PPE worn during patient exam.  Appropriate monitoring initiated. Patient is alert and oriented x1.  She is ill-appearing.  She is uncooperative and tearful.  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  S1-S2 heart sounds on exam.  Lungs are clear to auscultation.  Abdomen is soft, round, diffusely tender.  IV established and labs obtained.  Altered mental status protocol initiated.  Chest x-ray, CT of the abdomen pelvis with IV contrast obtained with the above findings.   After reviewing patient's hospital records from [place], it was found that she had history of mycotic aneurysm and consulted with radiologist, Dr. Guerrero who recommended CT of the head with and without IV contrast and CTA of the head.  See above findings.  White blood cell count 9200 no shift 27% bandemia platelets 93 hemoglobin 14.2 hematocrit 43.5.  Sodium 128 potassium 3.1 chloride 91 bicarb 25 BUN 20 creatinine 1.14 glucose 111 liver enzymes are elevated, ALT 69  bilirubin within normal.  Magnesium was 1.3.  Lactic 2.1 blood cultures pending.  Glucose was found to be 111 VBG: pH 7.45 PCO2 35.2 PO2 21.4 bicarb 23.  Troponin was 17, repeat was 12.  Ethanol within normal limits UDS was significant for methamphetamines and marijuana.  Urine was significant for small bilirubin large blood protein moderate leuk esterase positive nitrates 31-50 red cells 6-12 white blood cells trace bacteria respiratory COVID panel negative pregnancy negative.  Spoke with Dr. Beltrán who agrees patient should be admitted for treatment of sepsis and further evaluation.    I reviewed chart see above note review from [place], [place] my radiology interpretation CT of the head shows no intracranial hemorrhage, mass, shift.  CT of the abdomen pelvis shows no obstruction, ascites.  CTA of the head pending during admission chest x-ray shows no cardiomegaly, edema, infiltrates.  Prosthetic heart valves present. Differential Diagnoses, not all-inclusive and does not constitute entirety of all causes: DKA, sepsis, septic emboli, mycotic aneurysm, respiratory failure.  Sepsis confirmed by labs and vital signs.  Septic emboli will need further work-up, echo has been ordered but not performed.  Mycotic aneurysm was ruled out by CT head with and without.  Respiratory failure and DKA was ruled out by VBG.    Disposition/Treatment: Discussed results with patient, verbalized understanding. Discussed reasons to return to the ER, patient  verbalized understanding. Agreeable with plan of care. Patient was stable upon discharge.    Upon reassessment, patient is flesh tone warm and dry no acute distress noted.  Vital signs are stable.    Part of this note may be an electronic transcription/translation of spoken language to printed text using the Dragon Dictation System.         Final diagnoses:   Altered mental status, unspecified altered mental status type   Sepsis, due to unspecified organism, unspecified whether acute organ dysfunction present (HCC)   Acute cystitis with hematuria   Acute hyponatremia   Hypomagnesemia   IV drug abuse (HCC)   Hepatitis C virus infection without hepatic coma, unspecified chronicity       ED Disposition  ED Disposition     ED Disposition   Decision to Admit    Condition   --    Comment   Level of Care: Progressive Care [20]   Diagnosis: Severe sepsis (HCC) [[id number]]   Admitting Physician: DICKSON DELGADO [[id number]]   Attending Physician: DICKSON DELGADO [[id number]]   Isolate for COVID?: No [0]   Certification: I Certify That Inpatient Hospital Services Are Medically Necessary For Greater Than 2 Midnights               No follow-up provider specified.       Medication List      No changes were made to your prescriptions during this visit.          Madeline An, APRN  03/06/23 1747

## 2023-03-06 NOTE — H&P
[facility] Medicine Services  History & Physical    Patient Name: Ani Fields  : 1995  MRN: [id number]  Primary Care Physician:  Provider, No Known  Date of admission: 3/6/2023  Date and Time of Service: 23 . at 17:46 EST     Subjective      Chief Complaint: AMS, Malaise    History of Present Illness: Ani Fields is a 27 y.o. female who presented to [facility] on 3/6/2023 complaining of generalized malaise, chills, AMS, irritability. 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 Patient is currently septic, with low GCS and AMS. No acute complaints directly but brought in via her relatives. Her father, who she was not with until right now, also provides her history with recent surgeries, sepsis, long standing meth use. Agreeable with full care and inpatient management.     ER Course: meets sepsis criteria, unknown source on imaging, susp direct blood stream infection. 2D echo pending, ID consulted. Started IVF bolus and broad spectrum Abx.       Review of Systems   Constitutional: Positive for fever and malaise/fatigue. Negative for chills, weight gain and weight loss.   HENT: Negative for hearing loss, nosebleeds and sore throat.    Eyes: Negative for blurred vision, pain and redness.   Cardiovascular: Negative for chest pain, leg swelling, palpitations and syncope.   Respiratory: Negative for cough, shortness of breath and sputum production.    Endocrine: Negative for polyuria.   Skin: Negative for color change, rash and suspicious lesions.   Musculoskeletal: Positive for muscle weakness. Negative for back pain and joint pain.   Gastrointestinal: Negative for abdominal pain, anorexia, diarrhea, dysphagia, heartburn, melena, nausea and vomiting.   Genitourinary: Negative for dysuria, frequency and urgency.   Neurological: Positive for difficulty with concentration and weakness. Negative for dizziness and numbness.    Psychiatric/Behavioral: Positive for altered mental status. Negative for memory loss. The patient does not have insomnia and is not nervous/anxious.         Personal History     Past Medical History:   Diagnosis Date   • ADHD    • Anxiety    • Asymptomatic bacteriuria    • Bipolar 1 disorder (HCC)    • Candida esophagitis (HCC)    • Depression    • Endocarditis    • Hepatitis C    • Hypoalbuminemia    • Hypomagnesemia    • IV drug user    • Mycotic aneurysm (HCC)    • Normocytic anemia     secondary to chronic inflammation   • Thrombocytopenia (HCC)     at [facility] - resolved       No past surgical history on file.    Family History: family history is not on file. Otherwise pertinent FHx was reviewed and not pertinent to current issue.    Social History:      Home Medications:  Prior to Admission Medications     None            Allergies:  No Known Allergies    Objective      Vitals:   Temp:  [100.1 °F (37.8 °C)-101.5 °F (38.6 °C)] 101.5 °F (38.6 °C)  Heart Rate:  [114-136] 131  Resp:  [18-20] 18  BP: (110-128)/(59-75) 111/63    Physical Exam  Vitals and nursing note reviewed.   Constitutional:       General: She is not in acute distress.     Appearance: She is normal weight. She is ill-appearing.   HENT:      Head: Normocephalic and atraumatic.      Nose: Nose normal.      Mouth/Throat:      Mouth: Mucous membranes are dry.   Eyes:      Extraocular Movements: Extraocular movements intact.      Conjunctiva/sclera: Conjunctivae normal.      Pupils: Pupils are equal, round, and reactive to light.   Cardiovascular:      Rate and Rhythm: Regular rhythm. Tachycardia present.      Pulses: Normal pulses.      Heart sounds: Normal heart sounds.   Pulmonary:      Effort: Respiratory distress present.      Breath sounds: Normal breath sounds. No wheezing or rhonchi.      Comments: tachypnea  Abdominal:      General: Abdomen is flat. Bowel sounds are normal.      Palpations: Abdomen is soft.      Tenderness: There is no abdominal  tenderness. There is no guarding.   Musculoskeletal:         General: No swelling, tenderness or signs of injury. Normal range of motion.      Cervical back: Normal range of motion and neck supple.   Skin:     General: Skin is warm and dry.      Capillary Refill: Capillary refill takes less than 2 seconds.      Coloration: Skin is not jaundiced or pale.      Findings: No rash.   Neurological:      General: No focal deficit present.      Mental Status: She is alert. Mental status is at baseline. She is disoriented.      Motor: Weakness present.      Gait: Gait abnormal.      Comments: GCS 12  AOx 1-2  Strength 4-/5 B/L UE & LE     Psychiatric:         Mood and Affect: Mood normal.         Behavior: Behavior normal.         Judgment: Judgment normal.          Result Review    Result Review:  I have personally reviewed the results from the time of this admission to 3/6/2023 17:18 EST and agree with these findings:  [x]  Laboratory  [x]  Microbiology  [x]  Radiology  []  EKG/Telemetry   []  Cardiology/Vascular   []  Pathology  []  Old records  []  Other:  Most notable findings include: SIRS 4/4      Assessment & Plan        Active Hospital Problems:  Active Hospital Problems    Diagnosis    • **Severe sepsis (HCC)      Plan:   Severe Sepsis  - SIRS 4/4  - Started on IV Cefepime and Vanc  - Hx of Endocarditis with implanted pacer  - Procal 6.33    TME  - 2/2 sepsis, dehydration  - IVF bolus given, start LR at 125ml/hr if not drinking on her own    TYLOR  - slight hyponatremia from this  - IVF bolus as above    UTI  - potentially from dehydration  - IVF as above    Hep C, chronic  - Elevated LFTs, monitor  - Consider flare or superinfection  - ID consulted    IVDU  - meth and THC + on UDS  - Monitor for withdrawal    DVT prophylaxis:  No DVT prophylaxis order currently exists.    CODE STATUS:    Level Of Support Discussed With: Patient  Code Status (Patient has no pulse and is not breathing): CPR (Attempt to  Resuscitate)  Medical Interventions (Patient has pulse or is breathing): Full Support    Admission Status:  I believe this patient meets inpatient status.    I discussed the patient's findings and my recommendations with patient and family.    This patient has been examined wearing appropriate Personal Protective Equipment and discussed with Nurse. 03/06/23      Signature: Electronically signed by Mer Srivastava DO, 03/06/23, 17:18 EST.  [facility]

## 2023-03-06 NOTE — CASE MANAGEMENT/SOCIAL WORK
Continued Stay Note  [facility] Davey     Patient Name: Ani Fields  MRN: [id number]  Today's Date: 3/6/2023    Admit Date: 3/6/2023    Plan: Need CM Assessment   Discharge Plan     Row Name 03/06/23 1710       Plan    Plan Need CM Assessment    Plan Comments Attmepted to see for CM assessment and ptient sleeping                Wanda Winter RN

## 2023-03-06 NOTE — ED NOTES
Patient was found in the [address] with IV pulled out. Nurse and tech was able to get patient back to bed and a new IV started. Dad is now at bedside.

## 2023-03-06 NOTE — ED NOTES
This RN spoke to pt's mother. Mom states pt just moved to [place] from [place] in January, to live with her father. Mother was able to provide hospital names to obtain medical records. [facility] Unit sect obtaining records at this time.

## 2023-03-07 VITALS
BODY MASS INDEX: 22.77 KG/M2 | TEMPERATURE: 97.9 F | HEIGHT: 65 IN | OXYGEN SATURATION: 95 % | SYSTOLIC BLOOD PRESSURE: 81 MMHG | DIASTOLIC BLOOD PRESSURE: 53 MMHG | WEIGHT: 136.69 LBS | HEART RATE: 81 BPM | RESPIRATION RATE: 24 BRPM

## 2023-03-07 LAB
ANION GAP SERPL CALCULATED.3IONS-SCNC: 9 MMOL/L (ref 5–15)
BASOPHILS # BLD AUTO: 0 10*3/MM3 (ref 0–0.2)
BASOPHILS NFR BLD AUTO: 0.5 % (ref 0–1.5)
BUN SERPL-MCNC: 17 MG/DL (ref 6–20)
BUN/CREAT SERPL: 23.6 (ref 7–25)
CALCIUM SPEC-SCNC: 7.4 MG/DL (ref 8.6–10.5)
CHLORIDE SERPL-SCNC: 106 MMOL/L (ref 98–107)
CO2 SERPL-SCNC: 18 MMOL/L (ref 22–29)
CREAT SERPL-MCNC: 0.72 MG/DL (ref 0.57–1)
D-LACTATE SERPL-SCNC: 1.5 MMOL/L (ref 0.5–2)
DEPRECATED RDW RBC AUTO: 48.1 FL (ref 37–54)
EGFRCR SERPLBLD CKD-EPI 2021: 117.7 ML/MIN/1.73
EOSINOPHIL # BLD AUTO: 0.1 10*3/MM3 (ref 0–0.4)
EOSINOPHIL NFR BLD AUTO: 0.8 % (ref 0.3–6.2)
ERYTHROCYTE [DISTWIDTH] IN BLOOD BY AUTOMATED COUNT: 15.8 % (ref 12.3–15.4)
GLUCOSE BLDC GLUCOMTR-MCNC: 86 MG/DL (ref 70–105)
GLUCOSE SERPL-MCNC: 114 MG/DL (ref 65–99)
HAV IGM SERPL QL IA: ABNORMAL
HBV CORE IGM SERPL QL IA: ABNORMAL
HBV SURFACE AG SERPL QL IA: ABNORMAL
HCT VFR BLD AUTO: 36.1 % (ref 34–46.6)
HCV AB SER DONR QL: REACTIVE
HGB BLD-MCNC: 11.5 G/DL (ref 12–15.9)
HIV1+2 AB SER QL: NORMAL
LAB AP CASE REPORT: NORMAL
LYMPHOCYTES # BLD AUTO: 0.4 10*3/MM3 (ref 0.7–3.1)
LYMPHOCYTES NFR BLD AUTO: 5.4 % (ref 19.6–45.3)
MAGNESIUM SERPL-MCNC: 3.2 MG/DL (ref 1.6–2.6)
MCH RBC QN AUTO: 28.3 PG (ref 26.6–33)
MCHC RBC AUTO-ENTMCNC: 31.9 G/DL (ref 31.5–35.7)
MCV RBC AUTO: 88.6 FL (ref 79–97)
MONOCYTES # BLD AUTO: 0.4 10*3/MM3 (ref 0.1–0.9)
MONOCYTES NFR BLD AUTO: 5.8 % (ref 5–12)
NEUTROPHILS NFR BLD AUTO: 5.9 10*3/MM3 (ref 1.7–7)
NEUTROPHILS NFR BLD AUTO: 87.5 % (ref 42.7–76)
NRBC BLD AUTO-RTO: 0 /100 WBC (ref 0–0.2)
PATH REPORT.FINAL DX SPEC: NORMAL
PLATELET # BLD AUTO: 57 10*3/MM3 (ref 140–450)
PMV BLD AUTO: 11 FL (ref 6–12)
POTASSIUM SERPL-SCNC: 3.4 MMOL/L (ref 3.5–5.2)
QT INTERVAL: 398 MS
RBC # BLD AUTO: 4.07 10*6/MM3 (ref 3.77–5.28)
SODIUM SERPL-SCNC: 133 MMOL/L (ref 136–145)
TROPONIN T SERPL HS-MCNC: 13 NG/L
WBC NRBC COR # BLD: 6.7 10*3/MM3 (ref 3.4–10.8)
WHOLE BLOOD HOLD SPECIMEN: NORMAL

## 2023-03-07 PROCEDURE — 93005 ELECTROCARDIOGRAM TRACING: CPT | Performed by: INTERNAL MEDICINE

## 2023-03-07 PROCEDURE — 0 POTASSIUM CHLORIDE 10 MEQ/100ML SOLUTION: Performed by: INTERNAL MEDICINE

## 2023-03-07 PROCEDURE — 63710000001 ONDANSETRON PER 8 MG: Performed by: INTERNAL MEDICINE

## 2023-03-07 PROCEDURE — 82962 GLUCOSE BLOOD TEST: CPT

## 2023-03-07 PROCEDURE — 36415 COLL VENOUS BLD VENIPUNCTURE: CPT | Performed by: INTERNAL MEDICINE

## 2023-03-07 PROCEDURE — 25010000002 CEFEPIME PER 500 MG: Performed by: INTERNAL MEDICINE

## 2023-03-07 PROCEDURE — 84484 ASSAY OF TROPONIN QUANT: CPT

## 2023-03-07 PROCEDURE — 25010000002 KETOROLAC TROMETHAMINE PER 15 MG

## 2023-03-07 PROCEDURE — 85025 COMPLETE CBC W/AUTO DIFF WBC: CPT | Performed by: INTERNAL MEDICINE

## 2023-03-07 PROCEDURE — 25010000002 VANCOMYCIN 1 G RECONSTITUTED SOLUTION 1 EACH VIAL: Performed by: INTERNAL MEDICINE

## 2023-03-07 PROCEDURE — 87040 BLOOD CULTURE FOR BACTERIA: CPT | Performed by: INTERNAL MEDICINE

## 2023-03-07 PROCEDURE — 80048 BASIC METABOLIC PNL TOTAL CA: CPT | Performed by: INTERNAL MEDICINE

## 2023-03-07 PROCEDURE — 25010000002 ONDANSETRON PER 1 MG: Performed by: INTERNAL MEDICINE

## 2023-03-07 PROCEDURE — 83605 ASSAY OF LACTIC ACID: CPT | Performed by: INTERNAL MEDICINE

## 2023-03-07 PROCEDURE — 25010000002 MAGNESIUM SULFATE 2 GM/50ML SOLUTION: Performed by: INTERNAL MEDICINE

## 2023-03-07 PROCEDURE — 87522 HEPATITIS C REVRS TRNSCRPJ: CPT | Performed by: INTERNAL MEDICINE

## 2023-03-07 PROCEDURE — 83735 ASSAY OF MAGNESIUM: CPT | Performed by: INTERNAL MEDICINE

## 2023-03-07 PROCEDURE — G0432 EIA HIV-1/HIV-2 SCREEN: HCPCS | Performed by: INTERNAL MEDICINE

## 2023-03-07 PROCEDURE — 80074 ACUTE HEPATITIS PANEL: CPT | Performed by: INTERNAL MEDICINE

## 2023-03-07 PROCEDURE — 25010000002 KETOROLAC TROMETHAMINE PER 15 MG: Performed by: INTERNAL MEDICINE

## 2023-03-07 RX ORDER — KETOROLAC TROMETHAMINE 30 MG/ML
30 INJECTION, SOLUTION INTRAMUSCULAR; INTRAVENOUS EVERY 6 HOURS PRN
Status: DISCONTINUED | OUTPATIENT
Start: 2023-03-07 | End: 2023-03-07 | Stop reason: HOSPADM

## 2023-03-07 RX ORDER — KETOROLAC TROMETHAMINE 15 MG/ML
15 INJECTION, SOLUTION INTRAMUSCULAR; INTRAVENOUS ONCE
Status: COMPLETED | OUTPATIENT
Start: 2023-03-07 | End: 2023-03-07

## 2023-03-07 RX ADMIN — SODIUM CHLORIDE 2000 ML: 9 INJECTION, SOLUTION INTRAVENOUS at 07:56

## 2023-03-07 RX ADMIN — MAGNESIUM SULFATE HEPTAHYDRATE 2 G: 2 INJECTION, SOLUTION INTRAVENOUS at 01:17

## 2023-03-07 RX ADMIN — CEFEPIME 2 G: 2 INJECTION, POWDER, FOR SOLUTION INTRAVENOUS at 06:03

## 2023-03-07 RX ADMIN — ONDANSETRON 4 MG: 2 INJECTION INTRAMUSCULAR; INTRAVENOUS at 15:40

## 2023-03-07 RX ADMIN — KETOROLAC TROMETHAMINE 15 MG: 15 INJECTION, SOLUTION INTRAMUSCULAR; INTRAVENOUS at 04:07

## 2023-03-07 RX ADMIN — SODIUM CHLORIDE 500 ML: 9 INJECTION, SOLUTION INTRAVENOUS at 05:03

## 2023-03-07 RX ADMIN — VANCOMYCIN HYDROCHLORIDE 1000 MG: 1 INJECTION, POWDER, LYOPHILIZED, FOR SOLUTION INTRAVENOUS at 14:37

## 2023-03-07 RX ADMIN — ACETAMINOPHEN 650 MG: 325 TABLET, FILM COATED ORAL at 02:46

## 2023-03-07 RX ADMIN — ONDANSETRON HYDROCHLORIDE 4 MG: 4 TABLET, FILM COATED ORAL at 04:30

## 2023-03-07 RX ADMIN — POTASSIUM CHLORIDE 10 MEQ: 7.46 INJECTION, SOLUTION INTRAVENOUS at 02:58

## 2023-03-07 RX ADMIN — POTASSIUM CHLORIDE 10 MEQ: 7.46 INJECTION, SOLUTION INTRAVENOUS at 01:17

## 2023-03-07 RX ADMIN — KETOROLAC TROMETHAMINE 30 MG: 30 INJECTION, SOLUTION INTRAMUSCULAR at 15:39

## 2023-03-07 NOTE — CASE MANAGEMENT/SOCIAL WORK
Social Work Assessment   [facility]     Patient Name: Ani Fields  MRN: [id number]  Today's Date: 3/7/2023    Admit Date: 3/6/2023     Substance Abuse     Row Name 03/07/23 1545       Substance Use    Substance Use Comment SW attempted to meet with pt at bedside to discuss positive drug screen and provide resources and/or treatment options.  Pt was on the phone and crying.  Pt asked SW to come back at a later time.              Met with patient in room wearing PPE: mask.    Maintained distance greater than six feet and spent less than 15 minutes in the room.    Susanne Trevino LCSW    Office: [phone]  Fax: [phone]  [email or web address]

## 2023-03-07 NOTE — NURSING NOTE
RN at patients bedside giving her IV Toradol and IV Zofran for pain and nausea, patient on the phone with her mom and screaming and cussing this RN out because she had to wait so long for a cup of ice water.  Patient verbally stated she wanted to leave to go to another hospital that was going to be able to get her water and nausea medication quicker than we are able to.  Due to patient screaming so loud security was called to room and patient said she was going to just leave and called her dad screaming and cussing into phone and her iv was removed per RN and patient was escorted out by 3 security officers.  RN educated her and told her by leaving AMA she could die from her infection and she said she is going straight to another hospital that will take care of her needs and she is going to fior this hospital.

## 2023-03-07 NOTE — PROGRESS NOTES
"Pharmacy Antimicrobial Dosing Service    Subjective:  Ani Fields is a 27 y.o.female admitted with sepsis. Pharmacy has been consulted to dose Vancomycin.    PMH:        Assessment/Plan    1. Day #1 Vancomycin: Goal -600 mcg*h/mL  Vancomycin  1.25g x1 dose (given at 1658 on 3/6)  Vancomycin  750mg IV q12h (predicted AUC  488 mcg*h/mL)  Vancomycin trough: due 1600 on 3/8      Pharmacy to monitor drug levels, renal function, cultures and sensitivities, and patient's clinical progress.       Objective:  Relevant clinical data and objective history reviewed:  165.1 cm (65\")   62 kg (136 lb 11 oz)   Ideal body weight: 57 kg (125 lb 10.6 oz)  Adjusted ideal body weight: 59 kg (130 lb 1.1 oz)  Body mass index is 22.75 kg/m².        Results from last 7 days   Lab Units 03/06/23  1148   CREATININE mg/dL 1.14*     Estimated Creatinine Clearance: 72.6 mL/min (A) (by C-G formula based on SCr of 1.14 mg/dL (H)).  I/O last 3 completed shifts:  In: 2000 [IV Piggyback:2000]  Out: -     Results from last 7 days   Lab Units 03/06/23  1148   WBC 10*3/mm3 9.20     Temperature    03/06/23 1751 03/06/23 1851 03/06/23 2126   Temp: 100.5 °F (38.1 °C) 100.1 °F (37.8 °C) 97.8 °F (36.6 °C)     Baseline culture/source/susceptibility:  Microbiology Results (last 10 days)       Procedure Component Value - Date/Time    Respiratory Panel PCR w/COVID-19(SARS-CoV-2) JOSEPH/HALEY/TRISHA/PAD/COR/MAD/RYAN In-House, NP Swab in UTM/VTM, 3-4 HR TAT - Swab, Nasopharynx [[id number]]  (Normal) Collected: 03/06/23 1151    Lab Status: Final result Specimen: Swab from Nasopharynx Updated: 03/06/23 1247     ADENOVIRUS, PCR Not Detected     Coronavirus 229E Not Detected     Coronavirus HKU1 Not Detected     Coronavirus NL63 Not Detected     Coronavirus OC43 Not Detected     COVID19 Not Detected     Human Metapneumovirus Not Detected     Human Rhinovirus/Enterovirus Not Detected     Influenza A PCR Not Detected     Influenza B PCR Not Detected     Parainfluenza " Virus 1 Not Detected     Parainfluenza Virus 2 Not Detected     Parainfluenza Virus 3 Not Detected     Parainfluenza Virus 4 Not Detected     RSV, PCR Not Detected     Bordetella pertussis pcr Not Detected     Bordetella parapertussis PCR Not Detected     Chlamydophila pneumoniae PCR Not Detected     Mycoplasma pneumo by PCR Not Detected    Narrative:      In the setting of a positive respiratory panel with a viral infection PLUS a negative procalcitonin without other underlying concern for bacterial infection, consider observing off antibiotics or discontinuation of antibiotics and continue supportive care. If the respiratory panel is positive for atypical bacterial infection (Bordetella pertussis, Chlamydophila pneumoniae, or Mycoplasma pneumoniae), consider antibiotic de-escalation to target atypical bacterial infection.            Anti-Infectives (From admission, onward)      Ordered     Dose/Rate Route Frequency Start Stop    03/06/23 2025  cefepime 2 gm IVPB in 100 ml NS (MBP)        Ordering Provider: Mer Srivastava DO    2 g  over 4 Hours Intravenous Every 8 Hours 03/07/23 0500 03/10/23 0459    03/06/23 2215  vancomycin 750 mg in sodium chloride 0.9 % 250 mL IVPB-VTB        Ordering Provider: Mer Srivastava DO    750 mg  over 60 Minutes Intravenous Every 12 Hours 03/07/23 0500 03/10/23 0459    03/06/23 2025  cefepime 2 gm IVPB in 100 ml NS (MBP)        Ordering Provider: Mer Srivastava DO    2 g  over 30 Minutes Intravenous Once 03/06/23 2102 03/06/23 2159    03/06/23 2025  Pharmacy to dose vancomycin        Ordering Provider: Mer Srivastava DO   See Hyperspace for full Linked Orders Report.     Does not apply Continuous PRN 03/06/23 2025 03/09/23 2024 03/06/23 1505  Vancomycin HCl 1,250 mg in sodium chloride 0.9 % 250 mL IVPB        Ordering Provider: Madeline An APRN    20 mg/kg × 62 kg Intravenous Once 03/06/23 1600 03/06/23 1851    03/06/23 1346  cefTRIAXone (ROCEPHIN) 1 g in sodium chloride  0.9 % 100 mL IVPB        Ordering Provider: Madeline An APRN    1 g  200 mL/hr over 30 Minutes Intravenous Once 03/06/23 1348 03/06/23 1511          Sunita Thomas Pharm.D.  03/06/23 22:16 EST

## 2023-03-07 NOTE — PROGRESS NOTES
[facility] Medicine Services   Daily Progress Note    Patient Name: Ani Fields  : 1995  MRN: [id number]  Primary Care Physician:  Curtis, No Known  Date of admission: 3/6/2023  Date and Time of Service: 23  at 12:55 EST       Subjective      Chief Complaint: AMS, malaise    Patient Reports significantly improved mental status from yesterday. Still reports significant general malaise and Chest ache. Agreeable to inpatient management, told her it would be several days before discharge.     Review of Systems   Constitutional: Positive for fever and malaise/fatigue. Negative for chills, weight gain and weight loss.   HENT: Negative for hearing loss, nosebleeds and sore throat.    Eyes: Negative for blurred vision, pain and redness.   Cardiovascular: Positive for chest pain. Negative for leg swelling, palpitations and syncope.   Respiratory: Negative for cough, shortness of breath and sputum production.    Endocrine: Negative for polyuria.   Skin: Negative for color change, rash and suspicious lesions.   Musculoskeletal: Positive for muscle weakness. Negative for back pain and joint pain.   Gastrointestinal: Negative for abdominal pain, anorexia, diarrhea, dysphagia, heartburn, melena, nausea and vomiting.   Genitourinary: Negative for dysuria, frequency and urgency.   Neurological: Negative for dizziness, numbness and weakness.   Psychiatric/Behavioral: Negative for memory loss. The patient does not have insomnia and is not nervous/anxious.           Objective      Vitals:   Temp:  [96.1 °F (35.6 °C)-103.1 °F (39.5 °C)] 97.9 °F (36.6 °C)  Heart Rate:  [] 81  Resp:  [16-24] 24  BP: ()/(44-67) 81/53    Physical Exam  Vitals and nursing note reviewed.   Constitutional:       General: She is not in acute distress.     Appearance: She is normal weight. She is ill-appearing.   HENT:      Head: Normocephalic and atraumatic.      Nose: Nose normal.      Mouth/Throat:      Mouth:  Mucous membranes are dry.   Eyes:      Extraocular Movements: Extraocular movements intact.      Conjunctiva/sclera: Conjunctivae normal.      Pupils: Pupils are equal, round, and reactive to light.   Cardiovascular:      Rate and Rhythm: Normal rate and regular rhythm.      Heart sounds: Normal heart sounds.      Comments: Pulse 1/4, borderline MAP  Pulmonary:      Effort: Pulmonary effort is normal. No respiratory distress.      Breath sounds: Normal breath sounds. No wheezing or rhonchi.   Abdominal:      General: Abdomen is flat. Bowel sounds are normal.      Palpations: Abdomen is soft.      Tenderness: There is no abdominal tenderness. There is no guarding.   Musculoskeletal:         General: No swelling, tenderness or signs of injury. Normal range of motion.      Cervical back: Normal range of motion and neck supple.   Skin:     General: Skin is warm and dry.      Capillary Refill: Capillary refill takes less than 2 seconds.      Coloration: Skin is not jaundiced or pale.      Findings: No rash.   Neurological:      General: No focal deficit present.      Mental Status: She is alert and oriented to person, place, and time. Mental status is at baseline.      Motor: Weakness present.      Gait: Gait abnormal.      Comments: Strength 4-/5 B/L UE & LE     Psychiatric:         Mood and Affect: Mood normal.         Behavior: Behavior normal.         Judgment: Judgment normal.             Result Review    Result Review:  I have personally reviewed the results from the time of this admission to 3/7/2023 12:52 EST and agree with these findings:  [x]  Laboratory  [x]  Microbiology  [x]  Radiology  []  EKG/Telemetry   []  Cardiology/Vascular   []  Pathology  []  Old records  []  Other:  Most notable findings include: Borderline map, tachycardia          Assessment & Plan      Brief Patient Summary:  Ani Fields is a 27 y.o. female who comes in from home with AMS, diaphoresis, chills. Had valve replacement with pacer  done in Aug 2022 for endocarditis.       cefTRIAXone, 2 g, Intravenous, Q24H  enoxaparin, 40 mg, Subcutaneous, Daily  senna-docusate sodium, 2 tablet, Oral, BID  sodium chloride, 10 mL, Intravenous, Q12H       Pharmacy to dose vancomycin,          Active Hospital Problems:  Active Hospital Problems    Diagnosis    • **Severe sepsis (HCC)      Plan:   Severe Sepsis  - Bld Cx 2/2 + for staph, MSSA in blood but MRSA swab in nares +  - Cont on IV Cefepime and Vanc  - Hx of Endocarditis with implanted pacer  - Given 2L NS bolus more today because of   - Consulted Cardio for WILD, 2D echo results pending    TME  - 2/2 sepsis, dehydration  - Now resolved     TYLOR  - slight hyponatremia from this  - IVF bolus as above     UTI  - potentially from dehydration  - IVF as above     Hep C, chronic  - Elevated LFTs, monitor  - Consider flare or superinfection  - ID consulted     IVDU  - meth and THC + on UDS  - Monitor for withdrawal    DVT prophylaxis:  Medical DVT prophylaxis orders are present.    CODE STATUS:    Level Of Support Discussed With: Patient  Code Status (Patient has no pulse and is not breathing): CPR (Attempt to Resuscitate)  Medical Interventions (Patient has pulse or is breathing): Full Support      Disposition:  I expect patient to be discharged home in 4-6 days.    This patient has been examined wearing appropriate Personal Protective Equipment and discussed with Nurse. 03/07/23      Electronically signed by Mer Srivastava DO, 03/07/23, 12:52 EST.  [facility] Hospitalist Team

## 2023-03-07 NOTE — CASE MANAGEMENT/SOCIAL WORK
Discharge Planning Assessment  [facility]     Patient Name: Ani Fields  MRN: [id number]  Today's Date: 3/7/2023    Admit Date: 3/6/2023    Plan: Home with Father, watch for IV ATB, Agrreable to [facility] Ambulatory care if needed   Discharge Needs Assessment     Row Name 03/07/23 1325       Living Environment    People in Home spouse    Current Living Arrangements home    Potentially Unsafe Housing Conditions none    Primary Care Provided by self    Provides Primary Care For no one       Resource/Environmental Concerns    Resource/Environmental Concerns none    Transportation Concerns none       Food Insecurity    Within the past 12 months, you worried that your food would run out before you got the money to buy more. Never true    Within the past 12 months, the food you bought just didn't last and you didn't have money to get more. Never true       Transition Planning    Patient/Family Anticipates Transition to home with family    Patient/Family Anticipated Services at Transition none    Transportation Anticipated family or friend will provide       Discharge Needs Assessment    Readmission Within the Last 30 Days no previous admission in last 30 days    Equipment Currently Used at Home none    Concerns to be Addressed denies needs/concerns at this time    Anticipated Changes Related to Illness none    Equipment Needed After Discharge none               Discharge Plan     Row Name 03/07/23 1326       Plan    Plan Home with Father, watch for IV ATB, Agrreable to [facility]ulatory care if needed    Patient/Family in Agreement with Plan yes    Plan Comments Met with Patient at bedside Lives at home with father who provides transportation. IADL's Has New PCP appt pending per Patient with Dr Gokul Santos. Spoke about possiblity for needed IV ATB at discharge and if it is once a day she would be agreeable to using [facility]ulatory care center. SW notified for need for screen for +UDS. D/C BArriers: ID Following pending cultures,  Cardiology consult              Continued Care and Services - Admitted Since 3/6/2023    Coordination has not been started for this encounter.       Expected Discharge Date and Time     Expected Discharge Date Expected Discharge Time    Mar 10, 2023          Demographic Summary     Row Name 03/07/23 1325       General Information    Admission Type inpatient    Arrived From emergency department    Referral Source admission list    Reason for Consult discharge planning    Preferred Language English               Functional Status     Row Name 03/07/23 1325       Functional Status    Usual Activity Tolerance good    Current Activity Tolerance good       Functional Status, IADL    Medications independent    Meal Preparation independent    Housekeeping independent    Laundry independent    Shopping independent       Mental Status    General Appearance WDL WDL       Mental Status Summary    Recent Changes in Mental Status/Cognitive Functioning no changes              Met with patient at bedside wearing mask and goggles, Spent less than 15 minutes in room at greater than 6 feet distance.       Wanda Winter RN

## 2023-03-07 NOTE — CONSULTS
Infectious Diseases Consult Note    Referring Provider: Javed Fu MD    Reason for Consultation: Bacteremia    Patient Care Team:  Provider, No Known as PCP - General    Chief complaint fatigue and fever    Subjective     History of present illness:      This is a 27-year-old female with past medical history significant for IV drug use.  The patient is s/p mitral and tricuspid valve replacement in August 2022.  The patient was treated at [facility] in [place] and she was admitted there for around 3 months.  She was treated for 1 month with IV antibiotics after valve replacement.  During that hospital admission patient was diagnosis with left sided mycotic aneurysm required embolization.  She was bacteremic with MSSA and was treated initially with IV oxacillin and switched to IV cefazolin.  The patient started using IV drugs after she was moved to [place].  He presented hospital after she was found febrile and unresponsive.  She was tested positive for marijuana and methamphetamine.  She admitted to using IV methamphetamine.    Review of Systems   Review of Systems   Constitutional: Positive for fatigue and fever.   HENT: Negative.    Eyes: Negative.    Respiratory: Negative.    Cardiovascular: Negative.    Gastrointestinal: Negative.    Genitourinary: Negative.    Musculoskeletal: Negative.    Skin: Negative.    Neurological: Negative.    Hematological: Negative.    Psychiatric/Behavioral: Negative.        Medications  (Not in a hospital admission)      History  Past Medical History:   Diagnosis Date   • ADHD    • Anxiety    • Asymptomatic bacteriuria    • Bipolar 1 disorder (HCC)    • Candida esophagitis (HCC)    • Depression    • Endocarditis    • Hepatitis C    • Hypoalbuminemia    • Hypomagnesemia    • IV drug user    • Mycotic aneurysm (HCC)    • Normocytic anemia     secondary to chronic inflammation   • Thrombocytopenia (HCC)     at [facility] - resolved     History reviewed. No  pertinent surgical history.    Family History  History reviewed. No pertinent family history.    Social History   reports current drug use. Drugs: IV, Marijuana, and Methamphetamines.    Allergies  Patient has no known allergies.    Objective     Vital Signs   Vital Signs (last 24 hours)       03/06 0700  03/07 0659 03/07 0700  03/07 1225   Most Recent      Temp (°F) 97.6 -  103.1    96.1 -  97.9     97.9 (36.6) 03/07 1125    Heart Rate 76 -  136      81     81 03/07 0815    Resp 16 -  24    18 -  24     24 03/07 1125    BP 81/50 -  128/75    74/45 -  81/53     81/53 03/07 1125    SpO2 (%) 97 -  100      95     95 03/07 0815          Physical Exam:  Physical Exam  Vitals and nursing note reviewed.   Constitutional:       Appearance: She is well-developed.   HENT:      Head: Normocephalic and atraumatic.   Eyes:      Pupils: Pupils are equal, round, and reactive to light.      Comments: No subconjunctival hemorrhage   Cardiovascular:      Rate and Rhythm: Normal rate and regular rhythm.      Heart sounds: Murmur heard.   Pulmonary:      Effort: Pulmonary effort is normal. No respiratory distress.      Breath sounds: Normal breath sounds. No wheezing or rales.   Abdominal:      General: Bowel sounds are normal. There is no distension.      Palpations: Abdomen is soft. There is no mass.      Tenderness: There is no abdominal tenderness. There is no guarding or rebound.   Musculoskeletal:         General: No deformity. Normal range of motion.      Cervical back: Normal range of motion and neck supple.      Comments: No embolic phenomena of the hands or feet or splinter hemorrhage   Skin:     General: Skin is warm.      Findings: No erythema or rash.      Comments: No signs of septic emboli   Neurological:      Mental Status: She is alert and oriented to person, place, and time.      Cranial Nerves: No cranial nerve deficit.         Microbiology  Microbiology Results (last 10 days)     Procedure Component Value -  Date/Time    MRSA Screen, PCR (Inpatient) - Swab, Nares [[id number]]  (Abnormal) Collected: 03/06/23 2148    Lab Status: Final result Specimen: Swab from Nares Updated: 03/06/23 2330     MRSA PCR MRSA Detected    Narrative:      The negative predictive value of this diagnostic test is high and should only be used to consider de-escalating anti-MRSA therapy. A positive result may indicate colonization with MRSA and must be correlated clinically.    Blood Culture - Blood, Arm, Left [[id number]]  (Abnormal) Collected: 03/06/23 1231    Lab Status: Preliminary result Specimen: Blood from Arm, Left Updated: 03/07/23 0256     Blood Culture Abnormal Stain     Gram Stain Aerobic Bottle Gram positive cocci in clusters      Anaerobic Bottle Gram positive cocci in clusters    Narrative:      Less than seven (7) mL's of blood was collected.  Insufficient quantity may yield false negative results.    Urine Culture - Urine, Straight Cath [[id number]]  (Abnormal) Collected: 03/06/23 1214    Lab Status: Preliminary result Specimen: Urine from Straight Cath Updated: 03/07/23 0927     Urine Culture >100,000 CFU/mL Gram Negative Bacilli    Narrative:      Colonization of the urinary tract without infection is common. Treatment is discouraged unless the patient is symptomatic, pregnant, or undergoing an invasive urologic procedure.    Respiratory Panel PCR w/COVID-19(SARS-CoV-2) JOSEPH/HALEY/TRISHA/PAD/COR/MAD/RYAN In-House, NP Swab in UTM/VTM, 3-4 HR TAT - Swab, Nasopharynx [[id number]]  (Normal) Collected: 03/06/23 1151    Lab Status: Final result Specimen: Swab from Nasopharynx Updated: 03/06/23 1247     ADENOVIRUS, PCR Not Detected     Coronavirus 229E Not Detected     Coronavirus HKU1 Not Detected     Coronavirus NL63 Not Detected     Coronavirus OC43 Not Detected     COVID19 Not Detected     Human Metapneumovirus Not Detected     Human Rhinovirus/Enterovirus Not Detected     Influenza A PCR Not Detected     Influenza B PCR Not Detected      Parainfluenza Virus 1 Not Detected     Parainfluenza Virus 2 Not Detected     Parainfluenza Virus 3 Not Detected     Parainfluenza Virus 4 Not Detected     RSV, PCR Not Detected     Bordetella pertussis pcr Not Detected     Bordetella parapertussis PCR Not Detected     Chlamydophila pneumoniae PCR Not Detected     Mycoplasma pneumo by PCR Not Detected    Narrative:      In the setting of a positive respiratory panel with a viral infection PLUS a negative procalcitonin without other underlying concern for bacterial infection, consider observing off antibiotics or discontinuation of antibiotics and continue supportive care. If the respiratory panel is positive for atypical bacterial infection (Bordetella pertussis, Chlamydophila pneumoniae, or Mycoplasma pneumoniae), consider antibiotic de-escalation to target atypical bacterial infection.    Blood Culture - Blood, Arm, Left [[id number]]  (Abnormal) Collected: 03/06/23 1148    Lab Status: Preliminary result Specimen: Blood from Arm, Left Updated: 03/06/23 2327     Blood Culture Abnormal Stain     Gram Stain Anaerobic Bottle Gram positive cocci in clusters      Aerobic Bottle Gram positive cocci in clusters    Blood Culture ID, PCR - Blood, Arm, Left [[id number]]  (Abnormal) Collected: 03/06/23 1148    Lab Status: Final result Specimen: Blood from Arm, Left Updated: 03/06/23 2327     BCID, PCR Staph aureus. mecA/C and MREJ (methicillin resistance gene) NOT detected. Identification by BCID2 PCR     BOTTLE TYPE Anaerobic Bottle    Narrative:      Infectious disease consultation is highly recommended to rule out distant foci of infection.          Laboratory  Results from last 7 days   Lab Units 03/07/23  0504   WBC 10*3/mm3 6.70   HEMOGLOBIN g/dL 11.5*   HEMATOCRIT % 36.1   PLATELETS 10*3/mm3 57*     Results from last 7 days   Lab Units 03/07/23  0602   SODIUM mmol/L 133*   POTASSIUM mmol/L 3.4*   CHLORIDE mmol/L 106   CO2 mmol/L 18.0*   BUN mg/dL 17   CREATININE mg/dL  0.72   GLUCOSE mg/dL 114*   CALCIUM mg/dL 7.4*     Results from last 7 days   Lab Units 03/07/23  0602   SODIUM mmol/L 133*   POTASSIUM mmol/L 3.4*   CHLORIDE mmol/L 106   CO2 mmol/L 18.0*   BUN mg/dL 17   CREATININE mg/dL 0.72   GLUCOSE mg/dL 114*   CALCIUM mg/dL 7.4*                   Radiology  Imaging Results (Last 72 Hours)     Procedure Component Value Units Date/Time    CT Angiogram Head [[id number]] Collected: 03/06/23 1522     Updated: 03/06/23 1819    Narrative:      CT ANGIOGRAM HEAD    Date of Exam: 3/6/2023 2:46 PM EST    Indication: History of left MCA mycotic aneurysm, septic embolism.    Comparison: CT head without contrast 3/6/2023    Technique: CTA of the head was performed after the uneventful intravenous administration of iodinated contrast. Reconstructed coronal and sagittal images were also obtained. In addition, a 3-D volume rendered image was created for interpretation.   Automated exposure control and iterative reconstruction methods were used.     Findings:    *Anterior circulation: No aneurysm, significant stenosis or occlusion.  *Posterior circulation: No aneurysm, significant stenosis or occlusion.  *Anatomic variants: None of significance.  *Venous sinuses: Patent.      Impression:      1.No hemodynamically significant stenosis, large vessel cut or aneurysms in intracranial circulation      Electronically Signed: Evens Phillips    3/6/2023 6:17 PM EST    Workstation ID: [id number]    CT Abdomen Pelvis With Contrast [[id number]] Collected: 03/06/23 1451     Updated: 03/06/23 1513    Narrative:      CT ABDOMEN PELVIS W CONTRAST    Date of Exam: 3/6/2023 2:44 PM EST    Indication: UTI, recurrent/complicated (Female)  Sepsis.    Comparison: 9/7/2012    Technique: Axial CT images were obtained of the abdomen and pelvis following the uneventful intravenous administration of iodinated contrast. Sagittal and coronal reconstructions were performed.  Automated exposure control and iterative  reconstruction   methods were used.     Findings:    Lower Chest: Discoid atelectasis in the left lower lobe    Organs: There are several small ill-defined low-attenuation foci in the dome of the liver in the right hepatic lobe that are slightly linear and parallel. There is a suspected additional low-attenuation focus in the dome of the left hepatic lobe. These   were not present on the prior study. Spleen, pancreas, kidneys, adrenal glands, gallbladder unremarkable. No urolithiasis or hydronephrosis. Symmetric nephrograms with no perinephric stranding. No urothelial enhancement     Gastrointestinal: No acute abnormality demonstrated. Appendix not identified, however no periappendiceal inflammatory changes are evident.    Pelvis: Urinary bladder grossly unremarkable. Reproductive organs within normal limits. Cervical diaphragm appears to be present in the vagina. Trace free pelvic fluid, within physiologic limits    Peritoneum/Retroperitoneum: No ascites or pneumoperitoneum. Normal caliber aorta    Bones/Soft Tissues: No acute bony abnormality      Impression:        1. No obstructive uropathy or CT findings of pyelonephritis  2. Indeterminate low-attenuation foci in the dome of the liver. It's unclear if these structures are related to the portal triads and possibly represent peripheral portal vein thrombosis or dilated peripheral ducts. Contrast enhanced MRI may be   clarifying        Electronically Signed: Dakota Norris    3/6/2023 3:11 PM EST    Workstation ID: [id number]    CT Head With & Without Contrast [[id number]] Collected: 03/06/23 1456     Updated: 03/06/23 1500    Narrative:      CT HEAD W WO CONTRAST    Date of Exam: 3/6/2023 2:46 PM EST    Indication: Mental status change, unknown cause  History of left MCA mycotic aneurysm, septic embolism.    Comparison: None    Technique: Axial CT images were obtained of the head before and after the uneventful intravenous administration of iodinated contrast.   Sagittal and coronal reconstructions were performed.  Automated exposure control and iterative reconstruction methods   were used.     Findings:  Serpiginous hyperdense material is seen within this cortical subcortical high left frontal lobe, thought to represent surgical embolization material. No acute intracranial hemorrhage is seen. No mass lesion or mass effect or midline shift is identified.   No focal encephalomalacia is identified. Paranasal sinuses and mastoid air cells are clear. The calvarium is normal.      Impression:      Impression:    1. No acute intracranial findings.  2. Presumed embolization material within the high left frontal lobe cortical/subcortical distribution.    Electronically Signed: Kate Green    3/6/2023 2:58 PM EST    Workstation ID: [id number]    XR Chest AP [[id number]] Collected: 03/06/23 1359     Updated: 03/06/23 1403    Narrative:      XR CHEST AP    Date of Exam: 3/6/2023 1:50 PM EST    Indication: COVID Evaluation, Cough, Fever.    Comparison: March 15, 2012    Findings:  The heart is not definitely enlarged. Sternotomy wires are noted. Prosthetic heart valves are present. There are some vague interstitial changes involving the lungs. This in part could relate to shadows from pulmonary vessels and is suggested on prior   study. There is no chad consolidation or obvious pleural effusions.      Impression:      Impression:  1.Evidence for prior cardiothoracic surgery.  2.Slight prominence of pulmonary interstitial markings that could relate to shadows from pulmonary vessels accentuated secondary to technique.    Electronically Signed: tSeve Beckham    3/6/2023 2:01 PM EST    Workstation ID: [id number]          Cardiology      Results Review:  I have reviewed all clinical data, test, lab, and imaging results.       Schedule Meds  cefTRIAXone, 2 g, Intravenous, Q24H  enoxaparin, 40 mg, Subcutaneous, Daily  senna-docusate sodium, 2 tablet, Oral, BID  sodium chloride, 10 mL,  Intravenous, Q12H        Infusion Meds  Pharmacy to dose vancomycin,         PRN Meds  •  acetaminophen **OR** acetaminophen **OR** acetaminophen  •  senna-docusate sodium **AND** polyethylene glycol **AND** bisacodyl **AND** bisacodyl  •  ketorolac  •  magnesium sulfate **OR** magnesium sulfate **OR** magnesium sulfate  •  melatonin  •  ondansetron **OR** ondansetron  •  [DISCONTINUED] vancomycin **AND** Pharmacy to dose vancomycin  •  potassium chloride  •  sodium chloride  •  sodium chloride      Assessment & Plan       Assessment    Bacteremia with probable methicillin susceptible Staphylococcus aureus.  The presentation is highly concerning for bioprosthetic valve infection.    S/p mitral valve and tricuspid valve replacement with bioprosthetic valve on August 10, 2022    S/p embolization of left middle cerebral artery secondary to mycotic aneurysm in July 2022    History of IV drug use.  Patient was a clean for a few months and started using IV drugs a few days before admission    History of hepatitis C infection.  The clinic patient was treated few years ago and not sure what treatment she was offered    UTI and urine cultures growing gram-negative bacilli    Plan    Continue IV vancomycin for now.  If cultures confirmed MSSA then will discontinue IV vancomycin    Discontinue IV cefepime  Start ceftriaxone 2 g IV daily  Request repeat 1 set of blood culture  2D echo was already ordered to rule out vegetation  Consult cardiology for service for WILD  Request hepatitis C quantitative PCR  Request hepatitis panel and HIV screen  Supportive care  A.m. labs    Kae Chavira MD  03/07/23  12:25 EST    Note is dictated utilizing voice recognition software/Dragon

## 2023-03-07 NOTE — PAYOR COMM NOTE
"    Otoniel Lucas (27 y.o. Female)     Date of Birth   1995    Social Security Number       Address   [address] [place]    Home Phone   [phone]    MRN   [id number]       [employer]   None    Marital Status   Single                            Admission Date   3/6/23    Admission Type   Emergency    Admitting Provider   Mer Srivastava DO    Attending Provider   Javed Fu MD    Department, Room/Bed   [facility] EMERGENCY DEPARTMENT,        Discharge Date       Discharge Disposition       Discharge Destination                               Attending Provider: Javed Fu MD    Allergies: No Known Allergies    Isolation: None   Infection: MRSA No Isolation this Admit (23)   Code Status: CPR    Ht: 165.1 cm (65\")   Wt: 62 kg (136 lb 11 oz)    Admission Cmt: None   Principal Problem: Severe sepsis (HCC) [A41.9,R65.20]                 Active Insurance as of 3/6/2023     Primary Coverage     Payor Plan Insurance Group Employer/Plan Group    [place] [employer] [facility]      Payor Plan Address Payor Plan Phone Number Payor Plan Fax Number Effective Dates    [address] [phone]  2023 - None Entered    [place] [id number]       Subscriber Name Subscriber Birth Date Member ID       OTONIEL LUCAS 1995 [id number]                 Emergency Contacts      (Rel.) Home Phone Work Phone Mobile Phone    SELENE LUCAS (Father) -- -- [phone]    LISA ESCAMILLA (Mother) -- -- [phone]               History & Physical      Mer Srivastava DO at 23 1718              [facility] Medicine Services  History & Physical    Patient Name: Otoniel Lucas  : 1995  MRN: [id number]  Primary Care Physician:  Provider, No Known  Date of admission: 3/6/2023  Date and Time of Service: 23 . at 17:46 EST     Subjective       Chief Complaint: AMS, Malaise    History of Present Illness: Otoniel Lucas is a 27 " y.o. female who presented to [facility] on 3/6/2023 complaining of generalized malaise, chills, AMS, irritability. 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 Patient is currently septic, with low GCS and AMS. No acute complaints directly but brought in via her relatives. Her father, who she was not with until right now, also provides her history with recent surgeries, sepsis, long standing meth use. Agreeable with full care and inpatient management.     ER Course: meets sepsis criteria, unknown source on imaging, susp direct blood stream infection. 2D echo pending, ID consulted. Started IVF bolus and broad spectrum Abx.       Review of Systems   Constitutional: Positive for fever and malaise/fatigue. Negative for chills, weight gain and weight loss.   HENT: Negative for hearing loss, nosebleeds and sore throat.    Eyes: Negative for blurred vision, pain and redness.   Cardiovascular: Negative for chest pain, leg swelling, palpitations and syncope.   Respiratory: Negative for cough, shortness of breath and sputum production.    Endocrine: Negative for polyuria.   Skin: Negative for color change, rash and suspicious lesions.   Musculoskeletal: Positive for muscle weakness. Negative for back pain and joint pain.   Gastrointestinal: Negative for abdominal pain, anorexia, diarrhea, dysphagia, heartburn, melena, nausea and vomiting.   Genitourinary: Negative for dysuria, frequency and urgency.   Neurological: Positive for difficulty with concentration and weakness. Negative for dizziness and numbness.   Psychiatric/Behavioral: Positive for altered mental status. Negative for memory loss. The patient does not have insomnia and is not nervous/anxious.         Personal History     Past Medical History:   Diagnosis Date   • ADHD    • Anxiety    • Asymptomatic bacteriuria    • Bipolar 1 disorder (HCC)    • Candida esophagitis (HCC)    • Depression    •  Endocarditis    • Hepatitis C    • Hypoalbuminemia    • Hypomagnesemia    • IV drug user    • Mycotic aneurysm (HCC)    • Normocytic anemia     secondary to chronic inflammation   • Thrombocytopenia (HCC)     at [facility] - resolved       No past surgical history on file.    Family History: family history is not on file. Otherwise pertinent FHx was reviewed and not pertinent to current issue.    Social History:      Home Medications:  Prior to Admission Medications     None            Allergies:  No Known Allergies    Objective       Vitals:   Temp:  [100.1 °F (37.8 °C)-101.5 °F (38.6 °C)] 101.5 °F (38.6 °C)  Heart Rate:  [114-136] 131  Resp:  [18-20] 18  BP: (110-128)/(59-75) 111/63    Physical Exam  Vitals and nursing note reviewed.   Constitutional:       General: She is not in acute distress.     Appearance: She is normal weight. She is ill-appearing.   HENT:      Head: Normocephalic and atraumatic.      Nose: Nose normal.      Mouth/Throat:      Mouth: Mucous membranes are dry.   Eyes:      Extraocular Movements: Extraocular movements intact.      Conjunctiva/sclera: Conjunctivae normal.      Pupils: Pupils are equal, round, and reactive to light.   Cardiovascular:      Rate and Rhythm: Regular rhythm. Tachycardia present.      Pulses: Normal pulses.      Heart sounds: Normal heart sounds.   Pulmonary:      Effort: Respiratory distress present.      Breath sounds: Normal breath sounds. No wheezing or rhonchi.      Comments: tachypnea  Abdominal:      General: Abdomen is flat. Bowel sounds are normal.      Palpations: Abdomen is soft.      Tenderness: There is no abdominal tenderness. There is no guarding.   Musculoskeletal:         General: No swelling, tenderness or signs of injury. Normal range of motion.      Cervical back: Normal range of motion and neck supple.   Skin:     General: Skin is warm and dry.      Capillary Refill: Capillary refill takes less than 2 seconds.      Coloration: Skin is not jaundiced or  pale.      Findings: No rash.   Neurological:      General: No focal deficit present.      Mental Status: She is alert. Mental status is at baseline. She is disoriented.      Motor: Weakness present.      Gait: Gait abnormal.      Comments: GCS 12  AOx 1-2  Strength 4-/5 B/L UE & LE     Psychiatric:         Mood and Affect: Mood normal.         Behavior: Behavior normal.         Judgment: Judgment normal.          Result Review    Result Review:  I have personally reviewed the results from the time of this admission to 3/6/2023 17:18 EST and agree with these findings:  [x]  Laboratory  [x]  Microbiology  [x]  Radiology  []  EKG/Telemetry   []  Cardiology/Vascular   []  Pathology  []  Old records  []  Other:  Most notable findings include: SIRS 4/4      Assessment & Plan        Active Hospital Problems:  Active Hospital Problems    Diagnosis    • **Severe sepsis (HCC)      Plan:   Severe Sepsis  - SIRS 4/4  - Started on IV Cefepime and Vanc  - Hx of Endocarditis with implanted pacer  - Procal 6.33    TME  - 2/2 sepsis, dehydration  - IVF bolus given, start LR at 125ml/hr if not drinking on her own    TYLOR  - slight hyponatremia from this  - IVF bolus as above    UTI  - potentially from dehydration  - IVF as above    Hep C, chronic  - Elevated LFTs, monitor  - Consider flare or superinfection  - ID consulted    IVDU  - meth and THC + on UDS  - Monitor for withdrawal    DVT prophylaxis:  No DVT prophylaxis order currently exists.    CODE STATUS:    Level Of Support Discussed With: Patient  Code Status (Patient has no pulse and is not breathing): CPR (Attempt to Resuscitate)  Medical Interventions (Patient has pulse or is breathing): Full Support    Admission Status:  I believe this patient meets inpatient status.    I discussed the patient's findings and my recommendations with patient and family.    This patient has been examined wearing appropriate Personal Protective Equipment and discussed with Nurse.  03/06/23      Signature: Electronically signed by Mer Srivastava DO, 03/06/23, 17:18 EST.  [facility]      Electronically signed by Mer Srivastava DO at 03/06/23 175          Emergency Department Notes      Madeline An APRN at 03/06/23 1156     Attestation signed by Javed Fu MD at 03/07/23 0753        NON FACE TO FACE: This visit was performed by BOTH a physician and an APC. I performed all aspects of the MDM as documented.  Javed Fu MD 3/7/2023 07:53 EST                         Subjective   History of Present Illness  27-year-old female presents with 2-day history of nausea vomiting diarrhea.  She reports she is a type I diabetic.  She reports compliance with her insulin.  Patient is uncooperative, tearful.     History provided by:  Relative (Cousin at bedside)  History limited by:  Mental status change   used: No        Review of Systems    Past Medical History:   Diagnosis Date   • ADHD    • Anxiety    • Asymptomatic bacteriuria    • Bipolar 1 disorder (HCC)    • Candida esophagitis (HCC)    • Depression    • Endocarditis    • Hepatitis C    • Hypoalbuminemia    • Hypomagnesemia    • IV drug user    • Mycotic aneurysm (HCC)    • Normocytic anemia     secondary to chronic inflammation   • Thrombocytopenia (HCC)     at [facility] - resolved       No Known Allergies    No past surgical history on file.    No family history on file.    Social History     Socioeconomic History   • Marital status: Single           Objective   Physical Exam  Vitals and nursing note reviewed.   Constitutional:       General: She is awake. She is not in acute distress.     Appearance: Normal appearance. She is well-developed. She is obese. She is ill-appearing. She is not toxic-appearing or diaphoretic.   HENT:      Head: Normocephalic and atraumatic.      Nose: Nose normal. No rhinorrhea.      Mouth/Throat:      Mouth: Mucous membranes are moist.      Pharynx: Oropharynx is clear.  "  Eyes:      Extraocular Movements: Extraocular movements intact.      Conjunctiva/sclera: Conjunctivae normal.      Pupils: Pupils are equal, round, and reactive to light.   Cardiovascular:      Rate and Rhythm: Regular rhythm. Tachycardia present.      Pulses: Normal pulses.      Heart sounds: Normal heart sounds, S1 normal and S2 normal. Heart sounds not distant. No murmur heard.    No friction rub. No gallop.   Pulmonary:      Effort: Pulmonary effort is normal. No respiratory distress.      Breath sounds: Normal breath sounds and air entry. No wheezing or rales.   Abdominal:      General: Abdomen is flat. Bowel sounds are normal. There is no distension.      Palpations: Abdomen is soft. Abdomen is not rigid. There is no mass.      Tenderness: There is generalized abdominal tenderness. There is guarding. There is no rebound. Negative signs include Wyman's sign and McBurney's sign.      Hernia: No hernia is present.   Skin:     General: Skin is warm and dry.      Capillary Refill: Capillary refill takes less than 2 seconds.      Coloration: Skin is not jaundiced or pale.      Findings: Lesion (to the left of the umbilicus) present. No bruising, erythema or rash.   Neurological:      Mental Status: She is alert. She is disoriented.      GCS: GCS eye subscore is 4. GCS verbal subscore is 4. GCS motor subscore is 5.      Motor: Motor function is intact.      Comments: GCS: 13    Patient is able to verbalize her name and date of birth, unable to determine place and time.   Psychiatric:         Attention and Perception: She is inattentive.         Mood and Affect: Mood is anxious. Affect is tearful and inappropriate (Stating \"I want my mommy\").         Behavior: Behavior is uncooperative, agitated and withdrawn.         Thought Content: Thought content normal.         Cognition and Memory: Cognition is impaired. Memory is impaired.         Judgment: Judgment is impulsive.         Procedures    My EKG interpretation: " Sinus tachycardia with ventricular trigeminy RVH secondary to repolarization abnormality rate 126.  No previous available for comparison.  Reviewed by my attending, Dr. Fu.      ED Course  ED Course as of 03/06/23 1741   Mon Mar 06, 2023   1551 Awaiting CTA results. [AL]      ED Course User Index  [AL] Madeline An, APRN                                           Medical Decision Making  Acute cystitis with hematuria: acute illness or injury  Acute hyponatremia: acute illness or injury  Altered mental status, unspecified altered mental status type: acute illness or injury  Hepatitis C virus infection without hepatic coma, unspecified chronicity: chronic illness or injury  Hypomagnesemia: acute illness or injury  IV drug abuse (HCC): acute illness or injury  Sepsis, due to unspecified organism, unspecified whether acute organ dysfunction present (HCC): acute illness or injury  Amount and/or Complexity of Data Reviewed  External Data Reviewed: notes.     Details: The following is from [facility] in [place]:    Patient had presented to the hospital for altered mental status.  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  She was then upgraded to ICU for hypoxia.  She had an angiogram which showed a distal MCA mycotic aneurysm.  CT of the chest abdomen pelvis showed nodular foci with cavitation representing septic emboli as well as splenomegaly with hypoattenuation possible infarct, renal septic emboli Silvio cystitis of the hip girdle musculature, pyomyositis, tenosynovitis of the iliopsoas on the right.  Transthoracic echo showed EF of 60 to 65% with a mitral valve mobile density suspicious for vegetation and tricuspid and pulmonary valves not visualized.  Is also noted to have moderate pulmonary hypertension WILD revealed mitral valve Darling with severe regurg.  There was a large mobile echodensity on the tricuspid valve with moderate tricuspid  "regurgitation.  She was found to have bilateral pleural effusions left greater than right concerning for empyema.  This was drained by IR.  She was found to have Candida esophagitis.    Patient had ID consult was started on oxacillin, fluconazole, azithromycin, and heparin.  Blood cultures were found to be positive for Staph aureus.  Labs: ordered. Decision-making details documented in ED Course.  Radiology: ordered. Decision-making details documented in ED Course.  ECG/medicine tests: ordered. Decision-making details documented in ED Course.      Risk  OTC drugs.  Prescription drug management.      Critical Care  Total time providing critical care: minutes (60 minutes)    Interpreted by radiologist as below:     CT Head With & Without Contrast    Result Date: 3/6/2023  Impression: 1. No acute intracranial findings. 2. Presumed embolization material within the high left frontal lobe cortical/subcortical distribution. Electronically Signed: Kate Green  3/6/2023 2:58 PM EST  Workstation ID: [id number]    CT Abdomen Pelvis With Contrast    Result Date: 3/6/2023  1. No obstructive uropathy or CT findings of pyelonephritis 2. Indeterminate low-attenuation foci in the dome of the liver. It's unclear if these structures are related to the portal triads and possibly represent peripheral portal vein thrombosis or dilated peripheral ducts. Contrast enhanced MRI may be clarifying Electronically Signed: Dakota Norris  3/6/2023 3:11 PM EST  Workstation ID: [id number]    XR Chest AP    Result Date: 3/6/2023  Impression: 1.Evidence for prior cardiothoracic surgery. 2.Slight prominence of pulmonary interstitial markings that could relate to shadows from pulmonary vessels accentuated secondary to technique. Electronically Signed: Steve Beckham  3/6/2023 2:01 PM EST  Workstation ID: [id number]        /63   Pulse (!) 131   Temp (!) 103.1 °F (39.5 °C) (Rectal)   Resp 18   Ht 165.1 cm (65\")   Wt 62 kg (136 lb 11 oz)   LMP  (LMP " Unknown)   SpO2 98%   BMI 22.75 kg/m²      Lab Results (last 24 hours)     Procedure Component Value Units Date/Time    POC Glucose Once [[id number]]  (Abnormal) Collected: 03/06/23 1146    Specimen: Blood Updated: 03/06/23 1148     Glucose 111 mg/dL      Comment: Serial Number: [id number]:  [id number]       CBC & Differential [[id number]]  (Abnormal) Collected: 03/06/23 1148    Specimen: Blood from Arm, Left Updated: 03/06/23 1249    Narrative:      The following orders were created for panel order CBC & Differential.  Procedure                               Abnormality         Status                     ---------                               -----------         ------                     CBC Auto Differential[[id number]]        Abnormal            Final result               Scan Slide[[id number]]                                       Final result                 Please view results for these tests on the individual orders.    Comprehensive Metabolic Panel [[id number]]  (Abnormal) Collected: 03/06/23 1148    Specimen: Blood from Arm, Left Updated: 03/06/23 1224     Glucose 111 mg/dL      BUN 20 mg/dL      Creatinine 1.14 mg/dL      Sodium 128 mmol/L      Potassium 3.1 mmol/L      Chloride 91 mmol/L      CO2 25.0 mmol/L      Calcium 8.3 mg/dL      Total Protein 7.0 g/dL      Albumin 3.5 g/dL      ALT (SGPT) 69 U/L      AST (SGOT) 120 U/L      Alkaline Phosphatase 135 U/L      Total Bilirubin 0.9 mg/dL      Globulin 3.5 gm/dL      A/G Ratio 1.0 g/dL      BUN/Creatinine Ratio 17.5     Anion Gap 12.0 mmol/L      eGFR 67.8 mL/min/1.73     Narrative:      GFR Normal >60  Chronic Kidney Disease <60  Kidney Failure <15      Magnesium [[id number]]  (Abnormal) Collected: 03/06/23 1148    Specimen: Blood from Arm, Left Updated: 03/06/23 1224     Magnesium 1.3 mg/dL     Phosphorus [[id number]]  (Normal) Collected: 03/06/23 1148    Specimen: Blood from Arm, Left Updated: 03/06/23 1224     Phosphorus 2.6 mg/dL     Ketone  Bodies, Serum (Not performed at [place]) [[id number]]  (Normal) Collected: 03/06/23 1148    Specimen: Blood from Arm, Left Updated: 03/06/23 1302    Narrative:      The following orders were created for panel order Ketone Bodies, Serum (Not performed at [place]).  Procedure                               Abnormality         Status                     ---------                               -----------         ------                     Acetone[[id number]]                      Normal              Final result                 Please view results for these tests on the individual orders.    CBC Auto Differential [[id number]]  (Abnormal) Collected: 03/06/23 1148    Specimen: Blood from Arm, Left Updated: 03/06/23 1249     WBC 9.20 10*3/mm3      RBC 4.91 10*6/mm3      Hemoglobin 14.2 g/dL      Hematocrit 43.5 %      MCV 88.7 fL      MCH 29.0 pg      MCHC 32.7 g/dL      RDW 16.2 %      RDW-SD 49.4 fl      MPV 9.6 fL      Platelets 93 10*3/mm3     Narrative:      The previously reported component NRBC is no longer being reported. Previous result was 0.1 /100 WBC (Reference Range: 0.0-0.2 /100 WBC) on 3/6/2023 at 1220 EST.    Acetone [[id number]]  (Normal) Collected: 03/06/23 1148    Specimen: Blood from Arm, Left Updated: 03/06/23 1302     Acetone Negative    High Sensitivity Troponin T [[id number]]  (Abnormal) Collected: 03/06/23 1148    Specimen: Blood from Arm, Left Updated: 03/06/23 1224     HS Troponin T 17 ng/L     Narrative:      High Sensitive Troponin T Reference Range:  <10.0 ng/L- Negative Female for AMI  <15.0 ng/L- Negative Male for AMI  >=10 - Abnormal Female indicating possible myocardial injury.  >=15 - Abnormal Male indicating possible myocardial injury.   Clinicians would have to utilize clinical acumen, EKG, Troponin, and serial changes to determine if it is an Acute Myocardial Infarction or myocardial injury due to an underlying chronic condition.         Procalcitonin [[id number]]  (Abnormal) Collected:  "03/06/23 1148    Specimen: Blood from Arm, Left Updated: 03/06/23 1229     Procalcitonin 6.33 ng/mL     Narrative:      As a Marker for Sepsis (Non-Neonates):    1. <0.5 ng/mL represents a low risk of severe sepsis and/or septic shock.  2. >2 ng/mL represents a high risk of severe sepsis and/or septic shock.    As a Marker for Lower Respiratory Tract Infections that require antibiotic therapy:    PCT on Admission    Antibiotic Therapy       6-12 Hrs later    >0.5                Strongly Recommended  >0.25 - <0.5        Recommended   0.1 - 0.25          Discouraged              Remeasure/reassess PCT  <0.1                Strongly Discouraged     Remeasure/reassess PCT    As 28 day mortality risk marker: \"Change in Procalcitonin Result\" (>80% or <=80%) if Day 0 (or Day 1) and Day 4 values are available. Refer to http://www.[facility]-pct-calculator.com    Change in PCT <=80%  A decrease of PCT levels below or equal to 80% defines a positive change in PCT test result representing a higher risk for 28-day all-cause mortality of patients diagnosed with severe sepsis for septic shock.    Change in PCT >80%  A decrease of PCT levels of more than 80% defines a negative change in PCT result representing a lower risk for 28-day all-cause mortality of patients diagnosed with severe sepsis or septic shock.       Blood Culture - Blood, Arm, Left [[id number]] Collected: 03/06/23 1148    Specimen: Blood from Arm, Left Updated: 03/06/23 1154    Scan Slide [[id number]] Collected: 03/06/23 1148    Specimen: Blood from Arm, Left Updated: 03/06/23 1249     Scan Slide --     Comment: See Manual Differential Results       Manual Differential [[id number]]  (Abnormal) Collected: 03/06/23 1148    Specimen: Blood from Arm, Left Updated: 03/06/23 1249     Neutrophil % 51.0 %      Lymphocyte % 3.0 %      Bands %  27.0 %      Metamyelocyte % 19.0 %      Neutrophils Absolute 7.18 10*3/mm3      Lymphocytes Absolute 0.28 10*3/mm3      Anisocytosis " Slight/1+     Toxic Granulation Slight/1+     Vacuolated Neutrophils Slight/1+     Platelet Estimate Decreased    Path Consult Reflex [[id number]] Collected: 03/06/23 1148    Specimen: Blood from Arm, Left Updated: 03/06/23 1249     Pathology Review Yes    Respiratory Panel PCR w/COVID-19(SARS-CoV-2) JOSEPH/HALEY/TRISHA/PAD/COR/MAD/RYAN In-House, NP Swab in UTM/VTM, 3-4 HR TAT - Swab, Nasopharynx [[id number]]  (Normal) Collected: 03/06/23 1151    Specimen: Swab from Nasopharynx Updated: 03/06/23 1247     ADENOVIRUS, PCR Not Detected     Coronavirus 229E Not Detected     Coronavirus HKU1 Not Detected     Coronavirus NL63 Not Detected     Coronavirus OC43 Not Detected     COVID19 Not Detected     Human Metapneumovirus Not Detected     Human Rhinovirus/Enterovirus Not Detected     Influenza A PCR Not Detected     Influenza B PCR Not Detected     Parainfluenza Virus 1 Not Detected     Parainfluenza Virus 2 Not Detected     Parainfluenza Virus 3 Not Detected     Parainfluenza Virus 4 Not Detected     RSV, PCR Not Detected     Bordetella pertussis pcr Not Detected     Bordetella parapertussis PCR Not Detected     Chlamydophila pneumoniae PCR Not Detected     Mycoplasma pneumo by PCR Not Detected    Narrative:      In the setting of a positive respiratory panel with a viral infection PLUS a negative procalcitonin without other underlying concern for bacterial infection, consider observing off antibiotics or discontinuation of antibiotics and continue supportive care. If the respiratory panel is positive for atypical bacterial infection (Bordetella pertussis, Chlamydophila pneumoniae, or Mycoplasma pneumoniae), consider antibiotic de-escalation to target atypical bacterial infection.    Pregnancy, Urine - Straight Cath [[id number]]  (Normal) Collected: 03/06/23 1213    Specimen: Urine from Straight Cath Updated: 03/06/23 1227     HCG, Urine QL Negative    Urinalysis With Microscopic If Indicated (No Culture) - Straight Cath [[id number]]   (Abnormal) Collected: 03/06/23 1214    Specimen: Urine from Straight Cath Updated: 03/06/23 1232     Color, UA Ignacia     Appearance, UA Cloudy     pH, UA 6.0     Specific Gravity, UA 1.020     Glucose, UA Negative     Ketones, UA Negative     Bilirubin, UA Small (1+)     Comment: Confirmation testing is unavailable.  A serum bilirubin is recommended for further assessment.        Blood, UA Large (3+)     Protein,  mg/dL (2+)     Leuk Esterase, UA Moderate (2+)     Nitrite, UA Positive     Urobilinogen, UA 1.0 E.U./dL    Urinalysis, Microscopic Only - Straight Cath [[id number]]  (Abnormal) Collected: 03/06/23 1214    Specimen: Urine from Straight Cath Updated: 03/06/23 1243     RBC, UA 31-50 /HPF      WBC, UA 6-12 /HPF      Bacteria, UA Trace /HPF      Squamous Epithelial Cells, UA 0-2 /HPF      Yeast, UA Moderate/2+ Budding Yeast /HPF      Hyaline Casts, UA None Seen /LPF      Granular Casts, UA 0-2 /LPF      Methodology Manual Light Microscopy    Urine Drug Screen - Urine, Clean Catch [[id number]]  (Abnormal) Collected: 03/06/23 1214    Specimen: Urine, Clean Catch Updated: 03/06/23 1419     Amphet/Methamphet, Screen Positive     Barbiturates Screen, Urine Negative     Benzodiazepine Screen, Urine Negative     Cocaine Screen, Urine Negative     Opiate Screen Negative     THC, Screen, Urine Positive     Methadone Screen, Urine Negative     Oxycodone Screen, Urine Negative    Narrative:      Negative Thresholds Per Drugs Screened:    Amphetamines                 500 ng/ml  Barbiturates                 200 ng/ml  Benzodiazepines              100 ng/ml  Cocaine                      300 ng/ml  Methadone                    300 ng/ml  Opiates                      300 ng/ml  Oxycodone                    100 ng/ml  THC                           50 ng/ml    The Normal Value for all drugs tested is negative. This report includes final unconfirmed screening results to be used for medical treatment purposes only.  Unconfirmed results must not be used for non-medical purposes such as employment or legal testing. Clinical consideration should be applied to any drug of abuse test, particularly when unconfirmed results are used.          All urine drugs of abuse requests without chain of custody are for medical screening purposes only.  False positives are possible.      Urine Culture - Urine, Straight Cath [[id number]] Collected: 03/06/23 1214    Specimen: Urine from Straight Cath Updated: 03/06/23 1353    POC Lactate [[id number]]  (Abnormal) Collected: 03/06/23 1225    Specimen: Blood Updated: 03/06/23 1227     Lactate 2.1 mmol/L      Comment: Serial Number: [id number]:  [id number]       Blood Culture - Blood, Arm, Left [[id number]] Collected: 03/06/23 1231    Specimen: Blood from Arm, Left Updated: 03/06/23 1233    Blood Gas, Arterial - [[id number]]  (Abnormal) Collected: 03/06/23 1231    Specimen: Arterial Blood Updated: 03/06/23 1535     Site Right Radial     Yair's Test Positive     pH, Arterial 7.425 pH units      pCO2, Arterial 35.2 mm Hg      pO2, Arterial 21.4 mm Hg      HCO3, Arterial 23.1 mmol/L      Base Excess, Arterial -0.8 mmol/L      Comment: Serial Number: [id number]:  [id number]        O2 Saturation, Arterial 37.6 %      CO2 Content 24.2 mmol/L      Barometric Pressure for Blood Gas --     Comment: N/A        Modality Room Air     FIO2 21 %      Hemodilution No    High Sensitivity Troponin T 2Hr [[id number]]  (Abnormal) Collected: 03/06/23 1358    Specimen: Blood Updated: 03/06/23 1424     HS Troponin T 12 ng/L      Troponin T Delta -5 ng/L     Narrative:      High Sensitive Troponin T Reference Range:  <10.0 ng/L- Negative Female for AMI  <15.0 ng/L- Negative Male for AMI  >=10 - Abnormal Female indicating possible myocardial injury.  >=15 - Abnormal Male indicating possible myocardial injury.   Clinicians would have to utilize clinical acumen, EKG, Troponin, and serial changes to determine if it is an Acute  Myocardial Infarction or myocardial injury due to an underlying chronic condition.         Ethanol [[id number]] Collected: 03/06/23 1358    Specimen: Blood Updated: 03/06/23 1424     Ethanol % <0.010 %     Narrative:      Plasma Ethanol Clinical Symptoms:    ETOH (%)               Clinical Symptom  .01-.05              No apparent influence  .03-.12              Euphoria, Diminished judgment and attention   .09-.25              Impaired comprehension, Muscle incoordination  .18-.30              Confusion, Staggered gait, Slurred speech  .25-.40              Markedly decreased response to stimuli, unable to stand or                        walk, vomitting, sleep or stupor  .35-.50              Comatose, Anesthesia, Subnormal body temperature        STAT Lactic Acid, Reflex [[id number]]  (Normal) Collected: 03/06/23 1554    Specimen: Blood Updated: 03/06/23 1615     Lactate 1.4 mmol/L            Medications   potassium chloride 10 mEq in 100 mL IVPB (0 mEq Intravenous Stopped 3/6/23 1617)   Magnesium Sulfate - Total Dose 10 grams - Magnesium 1 or Less ( Intravenous Not Given:  See Alt 3/6/23 1712)     Or   Magnesium Sulfate - Total Dose 6 grams - Magnesium 1.1 - 1.5 (0 g Intravenous Stopped 3/6/23 1712)     Or   Magnesium Sulfate - Total Dose 4 grams - Magnesium 1.6 - 1.9 ( Intravenous Not Given:  See Alt 3/6/23 1712)   sodium chloride 0.9 % bolus 1,000 mL (0 mL Intravenous Stopped 3/6/23 1404)   cefTRIAXone (ROCEPHIN) 1 g in sodium chloride 0.9 % 100 mL IVPB (0 g Intravenous Stopped 3/6/23 1511)   midazolam (VERSED) injection 2 mg (2 mg Intravenous Given 3/6/23 1423)   iopamidol (ISOVUE-370) 76 % injection 100 mL (100 mL Intravenous Given 3/6/23 1447)   Vancomycin HCl 1,250 mg in sodium chloride 0.9 % 250 mL IVPB (1,250 mg Intravenous New Bag 3/6/23 1658)   lactated ringers bolus 1,000 mL (0 mL Intravenous Stopped 3/6/23 1712)   acetaminophen (TYLENOL) suppository 975 mg (975 mg Rectal Given 3/6/23 1551)        Patient  undressed and placed in gown for exam.  Appropriate PPE worn during patient exam.  Appropriate monitoring initiated. Patient is alert and oriented x1.  She is ill-appearing.  She is uncooperative and tearful.  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  S1-S2 heart sounds on exam.  Lungs are clear to auscultation.  Abdomen is soft, round, diffusely tender.  IV established and labs obtained.  Altered mental status protocol initiated.  Chest x-ray, CT of the abdomen pelvis with IV contrast obtained with the above findings.  After reviewing patient's hospital records from [place], it was found that she had history of mycotic aneurysm and consulted with radiologist, Dr. Guerrero who recommended CT of the head with and without IV contrast and CTA of the head.  See above findings.  White blood cell count 9200 no shift 27% bandemia platelets 93 hemoglobin 14.2 hematocrit 43.5.  Sodium 128 potassium 3.1 chloride 91 bicarb 25 BUN 20 creatinine 1.14 glucose 111 liver enzymes are elevated, ALT 69  bilirubin within normal.  Magnesium was 1.3.  Lactic 2.1 blood cultures pending.  Glucose was found to be 111 VBG: pH 7.45 PCO2 35.2 PO2 21.4 bicarb 23.  Troponin was 17, repeat was 12.  Ethanol within normal limits UDS was significant for methamphetamines and marijuana.  Urine was significant for small bilirubin large blood protein moderate leuk esterase positive nitrates 31-50 red cells 6-12 white blood cells trace bacteria respiratory COVID panel negative pregnancy negative.  Spoke with Dr. Beltrán who agrees patient should be admitted for treatment of sepsis and further evaluation.    I reviewed chart see above note review from [place], [place] my radiology interpretation CT of the head shows no intracranial hemorrhage, mass, shift.  CT of the abdomen pelvis shows no obstruction, ascites.  CTA of the head pending during admission chest x-ray shows no cardiomegaly, edema, infiltrates.  Prosthetic heart valves  present. Differential Diagnoses, not all-inclusive and does not constitute entirety of all causes: DKA, sepsis, septic emboli, mycotic aneurysm, respiratory failure.  Sepsis confirmed by labs and vital signs.  Septic emboli will need further work-up, echo has been ordered but not performed.  Mycotic aneurysm was ruled out by CT head with and without.  Respiratory failure and DKA was ruled out by VBG.    Disposition/Treatment: Discussed results with patient, verbalized understanding. Discussed reasons to return to the ER, patient verbalized understanding. Agreeable with plan of care. Patient was stable upon discharge.    Upon reassessment, patient is flesh tone warm and dry no acute distress noted.  Vital signs are stable.    Part of this note may be an electronic transcription/translation of spoken language to printed text using the Dragon Dictation System.         Final diagnoses:   Altered mental status, unspecified altered mental status type   Sepsis, due to unspecified organism, unspecified whether acute organ dysfunction present (HCC)   Acute cystitis with hematuria   Acute hyponatremia   Hypomagnesemia   IV drug abuse (HCC)   Hepatitis C virus infection without hepatic coma, unspecified chronicity       ED Disposition  ED Disposition     ED Disposition   Decision to Admit    Condition   --    Comment   Level of Care: Progressive Care [20]   Diagnosis: Severe sepsis (HCC) [2357262]   Admitting Physician: DICKSON DELGADO [[id number]]   Attending Physician: DICKSON DELGADO [[id number]]   Isolate for COVID?: No [0]   Certification: I Certify That Inpatient Hospital Services Are Medically Necessary For Greater Than 2 Midnights               No follow-up provider specified.       Medication List      No changes were made to your prescriptions during this visit.          Madeline An, APRN  03/06/23 7456      Electronically signed by Javed Fu MD at 03/07/23 9050     Ladonna Thornton RN at 03/06/23 1253        Yovanny RN  spoke to pt's mother. Mom states pt just moved to [place] from [place] in January, to live with her father. Mother was able to provide hospital names to obtain medical records. Pt used [facility] Unit sect obtaining records at this time.    Electronically signed by Ladonna Thornton RN at 03/06/23 1300     Melanie Magaña, RN at 03/06/23 1315        Patient was found in the [address] with IV pulled out. Nurse and tech was able to get patient back to bed and a new IV started. Dad is now at bedside.     Electronically signed by Melanie Magaña RN at 03/06/23 1316       Vital Signs (last day)     Date/Time Temp Temp src Pulse Resp BP Patient Position SpO2    03/07/23 0815 96.1 (35.6) Axillary 81 18 74/45 -- 95    03/07/23 0602 -- -- 76 -- 81/50 -- --    03/07/23 0500 97.6 (36.4) Oral -- -- -- -- --    03/07/23 0445 -- -- 89 -- 84/44 -- 97    03/07/23 0247 97.7 (36.5) Oral 94 18 93/50 -- 100    03/07/23 0000 -- -- 89 16 88/55 -- 100    03/06/23 2315 98 (36.7) Oral 105 16 93/61 -- 100    03/06/23 2126 97.8 (36.6) Oral 104 21 102/55 -- 100    03/06/23 2015 -- -- 103 -- 85/47 -- 97    03/06/23 1915 -- -- 112 -- 97/50 -- 99    03/06/23 18:51:13 100.1 (37.8) Rectal -- -- -- -- --    03/06/23 1835 -- -- 114 -- -- -- --    03/06/23 1831 -- -- -- -- 116/61 -- --    03/06/23 17:51:16 100.5 (38.1) Rectal 128 -- -- -- --    03/06/23 1746 -- -- 129 24 122/64 -- 97    03/06/23 1658 103.1 (39.5) Rectal -- -- -- -- --    03/06/23 1631 -- -- 131 18 111/63 -- 98    03/06/23 1602 -- -- 134 -- 110/59 -- 97    03/06/23 1446 -- -- 125 18 116/67 Lying 99    03/06/23 12:14:52 101.5 (38.6) Rectal -- -- -- -- --    03/06/23 1201 -- -- 136 -- -- -- 100    03/06/23 1152 -- -- 114 -- 112/71 -- 100    03/06/23 1117 -- -- -- 20 128/75 -- --    03/06/23 1116 100.1 (37.8) Oral -- -- -- -- --          Oxygen Therapy (last day)     Date/Time SpO2 Device (Oxygen Therapy) Flow (L/min) Oxygen Concentration (%) ETCO2 (mmHg)     03/07/23 0815 95 room air -- -- --    03/07/23 0445 97 -- -- -- --    03/07/23 0247 100 -- -- -- --    03/07/23 0000 100 -- -- -- --    03/06/23 2315 100 -- -- -- --    03/06/23 2126 100 -- -- -- --    03/06/23 2015 97 -- -- -- --    03/06/23 2000 -- room air -- -- --    03/06/23 1915 99 -- -- -- --    03/06/23 1746 97 room air -- -- --    03/06/23 1631 98 room air -- -- --    03/06/23 1602 97 -- -- -- --    03/06/23 1446 99 room air -- -- --    03/06/23 1201 100 -- -- -- --    03/06/23 1152 100 -- -- -- --          Physician Progress Notes (all)    No notes of this type exist for this encounter.         Consult Notes (all)    No notes of this type exist for this encounter.

## 2023-03-07 NOTE — NURSING NOTE
"Writer is not primary RN I answered a phone call of screaming woman that identifies herself as the pts boyfriends mother states \"I need to know what to do to get her to a [facility] no one is taking care of her there and someone needs to take care of her\" I have informed the caller that the nurse is at bedside. I then went to pt room to discuss the angry family member and pt was repeatedly calling the nurse \"asshole\" and \"a bitch\" . Writer called security   "

## 2023-03-07 NOTE — PROGRESS NOTES
"Pharmacy Antimicrobial Dosing Service    Subjective:  Ani Fields is a 27 y.o.female admitted with sepsis . Pharmacy has been consulted to dose Vancomycin for possible bacteremia.    PMH: mitral and tricuspid valve replacements with bioprosthetic valve (08/2022), mycotic aneurism (07/20220, HCV (unknown if treated)  ID following    Bcx (3/6): Staph aureus (P)  Ucx: G(-) bacilli  MRSA swab positive      Assessment/Plan    1. Day #1 Vancomycin: Goal -600 mcg*h/mL. Patient received vancomycin 1250 mg (20 mg/kg ABW) x 1 in ED and discontinued. ID recommends continuing vancomycin until final blood cultures. Restarted vancomycin 1000 mg IV q12h. No follow up levels ordered as this might be short therapy. Recommend monitoring blood cultures and ordering levels if needed.    2. Day #2 Ceftriaxone: 2gm IV q24h    Will continue to monitor drug levels, renal function, culture and sensitivities, and patient clinical status.       Objective:  Relevant clinical data and objective history reviewed:  165.1 cm (65\")   62 kg (136 lb 11 oz)   Ideal body weight: 57 kg (125 lb 10.6 oz)  Adjusted ideal body weight: 59 kg (130 lb 1.1 oz)  Body mass index is 22.75 kg/m².        Results from last 7 days   Lab Units 03/07/23  0602 03/06/23  1148   CREATININE mg/dL 0.72 1.14*     Estimated Creatinine Clearance: 114.9 mL/min (by C-G formula based on SCr of 0.72 mg/dL).  I/O last 3 completed shifts:  In: 3000 [IV Piggyback:3000]  Out: -     Results from last 7 days   Lab Units 03/07/23  0504 03/06/23  1148   WBC 10*3/mm3 6.70 9.20     Temperature    03/07/23 0500 03/07/23 0815 03/07/23 1125   Temp: 97.6 °F (36.4 °C) 96.1 °F (35.6 °C) 97.9 °F (36.6 °C)     Baseline culture/source/susceptibility:  Microbiology Results (last 10 days)       Procedure Component Value - Date/Time    MRSA Screen, PCR (Inpatient) - Swab, Nares [[id number]]  (Abnormal) Collected: 03/06/23 2148    Lab Status: Final result Specimen: Swab from Nares Updated: " 03/06/23 2330     MRSA PCR MRSA Detected    Narrative:      The negative predictive value of this diagnostic test is high and should only be used to consider de-escalating anti-MRSA therapy. A positive result may indicate colonization with MRSA and must be correlated clinically.    Blood Culture - Blood, Arm, Left [[id number]]  (Abnormal) Collected: 03/06/23 1231    Lab Status: Preliminary result Specimen: Blood from Arm, Left Updated: 03/07/23 0256     Blood Culture Abnormal Stain     Gram Stain Aerobic Bottle Gram positive cocci in clusters      Anaerobic Bottle Gram positive cocci in clusters    Narrative:      Less than seven (7) mL's of blood was collected.  Insufficient quantity may yield false negative results.    Urine Culture - Urine, Straight Cath [[id number]]  (Abnormal) Collected: 03/06/23 1214    Lab Status: Preliminary result Specimen: Urine from Straight Cath Updated: 03/07/23 0927     Urine Culture >100,000 CFU/mL Gram Negative Bacilli    Narrative:      Colonization of the urinary tract without infection is common. Treatment is discouraged unless the patient is symptomatic, pregnant, or undergoing an invasive urologic procedure.    Respiratory Panel PCR w/COVID-19(SARS-CoV-2) JOSEPH/HALEY/TRISHA/PAD/COR/MAD/RYAN In-House, NP Swab in UTM/VTM, 3-4 HR TAT - Swab, Nasopharynx [[id number]]  (Normal) Collected: 03/06/23 1151    Lab Status: Final result Specimen: Swab from Nasopharynx Updated: 03/06/23 1247     ADENOVIRUS, PCR Not Detected     Coronavirus 229E Not Detected     Coronavirus HKU1 Not Detected     Coronavirus NL63 Not Detected     Coronavirus OC43 Not Detected     COVID19 Not Detected     Human Metapneumovirus Not Detected     Human Rhinovirus/Enterovirus Not Detected     Influenza A PCR Not Detected     Influenza B PCR Not Detected     Parainfluenza Virus 1 Not Detected     Parainfluenza Virus 2 Not Detected     Parainfluenza Virus 3 Not Detected     Parainfluenza Virus 4 Not Detected     RSV, PCR Not  Detected     Bordetella pertussis pcr Not Detected     Bordetella parapertussis PCR Not Detected     Chlamydophila pneumoniae PCR Not Detected     Mycoplasma pneumo by PCR Not Detected    Narrative:      In the setting of a positive respiratory panel with a viral infection PLUS a negative procalcitonin without other underlying concern for bacterial infection, consider observing off antibiotics or discontinuation of antibiotics and continue supportive care. If the respiratory panel is positive for atypical bacterial infection (Bordetella pertussis, Chlamydophila pneumoniae, or Mycoplasma pneumoniae), consider antibiotic de-escalation to target atypical bacterial infection.    Blood Culture - Blood, Arm, Left [[id number]]  (Abnormal) Collected: 03/06/23 1148    Lab Status: Preliminary result Specimen: Blood from Arm, Left Updated: 03/06/23 2327     Blood Culture Abnormal Stain     Gram Stain Anaerobic Bottle Gram positive cocci in clusters      Aerobic Bottle Gram positive cocci in clusters    Blood Culture ID, PCR - Blood, Arm, Left [[id number]]  (Abnormal) Collected: 03/06/23 1148    Lab Status: Final result Specimen: Blood from Arm, Left Updated: 03/06/23 2327     BCID, PCR Staph aureus. mecA/C and MREJ (methicillin resistance gene) NOT detected. Identification by BCID2 PCR     BOTTLE TYPE Anaerobic Bottle    Narrative:      Infectious disease consultation is highly recommended to rule out distant foci of infection.            Anti-Infectives (From admission, onward)      Ordered     Dose/Rate Route Frequency Start Stop    03/07/23 1224  cefTRIAXone (ROCEPHIN) 2 g in sodium chloride 0.9 % 100 mL IVPB        Ordering Provider: Kae Chavira MD    2 g  200 mL/hr over 30 Minutes Intravenous Every 24 Hours 03/07/23 1500 03/21/23 1459    03/07/23 1342  vancomycin (VANCOCIN) 1,000 mg in sodium chloride 0.9 % 250 mL IVPB-VTB        Ordering Provider: Kae Chavira MD    1,000 mg Intravenous Every 12 Hours 03/07/23 1400  03/09/23 1359    03/06/23 2025  cefepime 2 gm IVPB in 100 ml NS (MBP)        Ordering Provider: Mer Srivastava DO    2 g  over 30 Minutes Intravenous Once 03/06/23 2102 03/06/23 2159    03/06/23 2025  Pharmacy to dose vancomycin        Ordering Provider: Mer Srivastava DO   See Hyperspace for full Linked Orders Report.     Does not apply Continuous PRN 03/06/23 2025 03/09/23 2024 03/06/23 1505  Vancomycin HCl 1,250 mg in sodium chloride 0.9 % 250 mL IVPB        Ordering Provider: Madeline An APRN    20 mg/kg × 62 kg Intravenous Once 03/06/23 1600 03/06/23 1851    03/06/23 1346  cefTRIAXone (ROCEPHIN) 1 g in sodium chloride 0.9 % 100 mL IVPB        Ordering Provider: Madeline An APRN    1 g  200 mL/hr over 30 Minutes Intravenous Once 03/06/23 1348 03/06/23 1511            Jeannie Anne, PharmD  03/07/23 13:42 EST

## 2023-03-08 LAB — BACTERIA SPEC AEROBE CULT: ABNORMAL

## 2023-03-09 LAB
BACTERIA SPEC AEROBE CULT: ABNORMAL
BACTERIA SPEC AEROBE CULT: ABNORMAL
GRAM STN SPEC: ABNORMAL
HCV RNA SERPL NAA+PROBE-ACNC: NORMAL IU/ML
ISOLATED FROM: ABNORMAL
ISOLATED FROM: ABNORMAL
TEST INFORMATION: NORMAL

## 2023-03-10 LAB — QT INTERVAL: 334 MS

## 2023-03-12 LAB — BACTERIA SPEC AEROBE CULT: NORMAL

## 2023-03-13 NOTE — PAYOR COMM NOTE
"This is discharge notification for Otoniel Lucas  Reference/Auth # [id number]  Pt left AMA on 3/7/23    Romi White, RN, BSN  Utilization Review Nurse  [facility]  Direct & confidential phone # [phone]  Fax # [phone]      Otoniel Lucas (27 y.o. Female)     Date of Birth   1995    Social Security Number       Address   [address] [place] [address] [address] [place] [address]    Home Phone   [phone]    MRN   [id number]       Church   None    Marital Status   Single                            Admission Date   3/6/23    Admission Type   Emergency    Admitting Provider   Mer Srivastava DO    Attending Provider       Department, Room/Bed   [facility] EMERGENCY DEPARTMENT, 31/31       Discharge Date   3/7/2023    Discharge Disposition   Left Against Medical Advice    Discharge Destination                               Attending Provider: (none)   Allergies: No Known Allergies    Isolation: None   Infection: MRSA No Isolation this Admit (03/07/23)   Code Status: Prior    Ht: 165.1 cm (65\")   Wt: 62 kg (136 lb 11 oz)    Admission Cmt: None   Principal Problem: Severe sepsis (HCC) [A41.9,R65.20]                 Active Insurance as of 3/6/2023     Primary Coverage     Payor Plan Insurance Group Employer/Plan Group    [employer] [place] [employer]      Payor Plan Address Payor Plan Phone Number Payor Plan Fax Number Effective Dates    [address] [phone]  1/1/2023 - None Entered    [place] [address]       Subscriber Name Subscriber Birth Date Member ID       OTONIEL LUCAS 1995 [id number]                 Emergency Contacts      (Rel.) Home Phone Work Phone Mobile Phone    SELENE LUCAS (Father) -- -- [phone]    LISA ESCAMILLA (Mother) -- -- [phone]            Discharge Summary    No notes of this type exist for this encounter.         "

## 2023-03-22 DIAGNOSIS — A41.9 SEVERE SEPSIS: Primary | ICD-10-CM

## 2023-03-22 DIAGNOSIS — R65.20 SEVERE SEPSIS: Primary | ICD-10-CM

## 2023-03-23 ENCOUNTER — HOSPITAL ENCOUNTER (OUTPATIENT)
Dept: INFUSION THERAPY | Facility: HOSPITAL | Age: 28
End: 2023-03-23
Payer: MEDICAID

## 2023-03-30 ENCOUNTER — HOSPITAL ENCOUNTER (OUTPATIENT)
Dept: INFUSION THERAPY | Facility: HOSPITAL | Age: 28
Discharge: HOME | End: 2023-03-30
Admitting: INTERNAL MEDICINE
Payer: MEDICAID

## 2023-03-30 VITALS
DIASTOLIC BLOOD PRESSURE: 73 MMHG | HEART RATE: 99 BPM | TEMPERATURE: 97 F | SYSTOLIC BLOOD PRESSURE: 107 MMHG | RESPIRATION RATE: 16 BRPM

## 2023-03-30 DIAGNOSIS — R65.20 SEVERE SEPSIS: Primary | ICD-10-CM

## 2023-03-30 DIAGNOSIS — A41.9 SEVERE SEPSIS: Primary | ICD-10-CM

## 2023-03-30 PROCEDURE — 25010000002 DAPTOMYCIN PER 1 MG: Performed by: INTERNAL MEDICINE

## 2023-03-30 PROCEDURE — 96365 THER/PROPH/DIAG IV INF INIT: CPT

## 2023-03-30 PROCEDURE — 36415 COLL VENOUS BLD VENIPUNCTURE: CPT

## 2023-03-30 RX ADMIN — DAPTOMYCIN 600 MG: 500 INJECTION, POWDER, LYOPHILIZED, FOR SOLUTION INTRAVENOUS at 13:26

## 2023-03-31 ENCOUNTER — HOSPITAL ENCOUNTER (OUTPATIENT)
Dept: INFUSION THERAPY | Facility: HOSPITAL | Age: 28
Discharge: HOME | End: 2023-03-31
Admitting: NURSE PRACTITIONER
Payer: MEDICAID

## 2023-03-31 VITALS
SYSTOLIC BLOOD PRESSURE: 128 MMHG | TEMPERATURE: 98.2 F | OXYGEN SATURATION: 97 % | DIASTOLIC BLOOD PRESSURE: 77 MMHG | HEART RATE: 118 BPM | RESPIRATION RATE: 16 BRPM

## 2023-03-31 DIAGNOSIS — A41.9 SEVERE SEPSIS: Primary | ICD-10-CM

## 2023-03-31 DIAGNOSIS — R65.20 SEVERE SEPSIS: Primary | ICD-10-CM

## 2023-03-31 PROCEDURE — 36415 COLL VENOUS BLD VENIPUNCTURE: CPT

## 2023-03-31 PROCEDURE — 25010000002 DAPTOMYCIN PER 1 MG: Performed by: INTERNAL MEDICINE

## 2023-03-31 PROCEDURE — 96365 THER/PROPH/DIAG IV INF INIT: CPT

## 2023-03-31 RX ADMIN — DAPTOMYCIN 600 MG: 500 INJECTION, POWDER, LYOPHILIZED, FOR SOLUTION INTRAVENOUS at 14:07

## 2023-04-01 ENCOUNTER — HOSPITAL ENCOUNTER (OUTPATIENT)
Dept: INFUSION THERAPY | Facility: HOSPITAL | Age: 28
Discharge: HOME | End: 2023-04-01
Admitting: NURSE PRACTITIONER
Payer: MEDICAID

## 2023-04-01 VITALS
SYSTOLIC BLOOD PRESSURE: 108 MMHG | DIASTOLIC BLOOD PRESSURE: 75 MMHG | TEMPERATURE: 98.6 F | OXYGEN SATURATION: 97 % | HEART RATE: 78 BPM

## 2023-04-01 DIAGNOSIS — A41.9 SEVERE SEPSIS: Primary | ICD-10-CM

## 2023-04-01 DIAGNOSIS — R65.20 SEVERE SEPSIS: Primary | ICD-10-CM

## 2023-04-01 PROCEDURE — 36415 COLL VENOUS BLD VENIPUNCTURE: CPT

## 2023-04-01 PROCEDURE — 96365 THER/PROPH/DIAG IV INF INIT: CPT

## 2023-04-01 PROCEDURE — 25010000002 DAPTOMYCIN PER 1 MG: Performed by: INTERNAL MEDICINE

## 2023-04-01 RX ADMIN — SODIUM CHLORIDE 600 MG: 9 INJECTION, SOLUTION INTRAVENOUS at 09:37

## 2023-04-02 ENCOUNTER — HOSPITAL ENCOUNTER (OUTPATIENT)
Dept: INFUSION THERAPY | Facility: HOSPITAL | Age: 28
Discharge: HOME | End: 2023-04-02
Admitting: NURSE PRACTITIONER
Payer: MEDICAID

## 2023-04-02 VITALS
TEMPERATURE: 98.6 F | DIASTOLIC BLOOD PRESSURE: 63 MMHG | HEART RATE: 85 BPM | OXYGEN SATURATION: 100 % | SYSTOLIC BLOOD PRESSURE: 111 MMHG

## 2023-04-02 DIAGNOSIS — A41.9 SEVERE SEPSIS: Primary | ICD-10-CM

## 2023-04-02 DIAGNOSIS — R65.20 SEVERE SEPSIS: Primary | ICD-10-CM

## 2023-04-02 PROCEDURE — 96365 THER/PROPH/DIAG IV INF INIT: CPT

## 2023-04-02 PROCEDURE — 36415 COLL VENOUS BLD VENIPUNCTURE: CPT

## 2023-04-02 PROCEDURE — 25010000002 DAPTOMYCIN PER 1 MG: Performed by: INTERNAL MEDICINE

## 2023-04-02 RX ADMIN — DAPTOMYCIN 600 MG: 500 INJECTION, POWDER, LYOPHILIZED, FOR SOLUTION INTRAVENOUS at 09:17

## 2023-04-03 ENCOUNTER — HOSPITAL ENCOUNTER (OUTPATIENT)
Dept: INFUSION THERAPY | Facility: HOSPITAL | Age: 28
Discharge: HOME | End: 2023-04-03
Admitting: NURSE PRACTITIONER
Payer: MEDICAID

## 2023-04-03 VITALS
TEMPERATURE: 98.4 F | DIASTOLIC BLOOD PRESSURE: 66 MMHG | SYSTOLIC BLOOD PRESSURE: 105 MMHG | OXYGEN SATURATION: 100 % | HEART RATE: 70 BPM

## 2023-04-03 DIAGNOSIS — R65.20 SEVERE SEPSIS: Primary | ICD-10-CM

## 2023-04-03 DIAGNOSIS — A41.9 SEVERE SEPSIS: Primary | ICD-10-CM

## 2023-04-03 PROCEDURE — 25010000002 DAPTOMYCIN PER 1 MG: Performed by: INTERNAL MEDICINE

## 2023-04-03 PROCEDURE — 96365 THER/PROPH/DIAG IV INF INIT: CPT

## 2023-04-03 RX ADMIN — DAPTOMYCIN 600 MG: 500 INJECTION, POWDER, LYOPHILIZED, FOR SOLUTION INTRAVENOUS at 13:53

## 2023-04-04 ENCOUNTER — HOSPITAL ENCOUNTER (OUTPATIENT)
Dept: INFUSION THERAPY | Facility: HOSPITAL | Age: 28
Discharge: HOME | End: 2023-04-04
Admitting: NURSE PRACTITIONER
Payer: MEDICAID

## 2023-04-04 VITALS
TEMPERATURE: 98.3 F | HEART RATE: 81 BPM | SYSTOLIC BLOOD PRESSURE: 115 MMHG | DIASTOLIC BLOOD PRESSURE: 59 MMHG | OXYGEN SATURATION: 97 % | RESPIRATION RATE: 12 BRPM

## 2023-04-04 DIAGNOSIS — R65.20 SEVERE SEPSIS: Primary | ICD-10-CM

## 2023-04-04 DIAGNOSIS — A41.9 SEVERE SEPSIS: Primary | ICD-10-CM

## 2023-04-04 PROCEDURE — 96365 THER/PROPH/DIAG IV INF INIT: CPT

## 2023-04-04 PROCEDURE — 25010000002 DAPTOMYCIN PER 1 MG: Performed by: INTERNAL MEDICINE

## 2023-04-04 RX ADMIN — DAPTOMYCIN 600 MG: 500 INJECTION, POWDER, LYOPHILIZED, FOR SOLUTION INTRAVENOUS at 13:25

## 2023-04-05 ENCOUNTER — HOSPITAL ENCOUNTER (OUTPATIENT)
Dept: INFUSION THERAPY | Facility: HOSPITAL | Age: 28
Discharge: HOME | End: 2023-04-05
Admitting: NURSE PRACTITIONER
Payer: MEDICAID

## 2023-04-05 VITALS — WEIGHT: 142.2 LBS | BODY MASS INDEX: 23.66 KG/M2

## 2023-04-05 DIAGNOSIS — R65.20 SEVERE SEPSIS: Primary | ICD-10-CM

## 2023-04-05 DIAGNOSIS — A41.9 SEVERE SEPSIS: Primary | ICD-10-CM

## 2023-04-05 PROCEDURE — 96365 THER/PROPH/DIAG IV INF INIT: CPT

## 2023-04-05 PROCEDURE — 25010000002 DAPTOMYCIN PER 1 MG: Performed by: INTERNAL MEDICINE

## 2023-04-05 RX ADMIN — DAPTOMYCIN 600 MG: 500 INJECTION, POWDER, LYOPHILIZED, FOR SOLUTION INTRAVENOUS at 13:50

## 2023-04-06 ENCOUNTER — HOSPITAL ENCOUNTER (OUTPATIENT)
Dept: INFUSION THERAPY | Facility: HOSPITAL | Age: 28
Discharge: HOME | End: 2023-04-06
Admitting: NURSE PRACTITIONER
Payer: MEDICAID

## 2023-04-06 VITALS — SYSTOLIC BLOOD PRESSURE: 96 MMHG | TEMPERATURE: 98.7 F | DIASTOLIC BLOOD PRESSURE: 66 MMHG | HEART RATE: 85 BPM

## 2023-04-06 DIAGNOSIS — A41.9 SEVERE SEPSIS: Primary | ICD-10-CM

## 2023-04-06 DIAGNOSIS — R65.20 SEVERE SEPSIS: Primary | ICD-10-CM

## 2023-04-06 PROCEDURE — 25010000002 DAPTOMYCIN PER 1 MG: Performed by: INTERNAL MEDICINE

## 2023-04-06 PROCEDURE — 36415 COLL VENOUS BLD VENIPUNCTURE: CPT

## 2023-04-06 PROCEDURE — 96365 THER/PROPH/DIAG IV INF INIT: CPT

## 2023-04-06 RX ADMIN — DAPTOMYCIN 650 MG: 500 INJECTION, POWDER, LYOPHILIZED, FOR SOLUTION INTRAVENOUS at 13:48

## 2023-04-07 ENCOUNTER — HOSPITAL ENCOUNTER (OUTPATIENT)
Dept: INFUSION THERAPY | Facility: HOSPITAL | Age: 28
Discharge: HOME | End: 2023-04-07
Admitting: NURSE PRACTITIONER
Payer: MEDICAID

## 2023-04-07 VITALS
OXYGEN SATURATION: 97 % | RESPIRATION RATE: 12 BRPM | HEART RATE: 91 BPM | SYSTOLIC BLOOD PRESSURE: 113 MMHG | DIASTOLIC BLOOD PRESSURE: 69 MMHG | TEMPERATURE: 98.2 F

## 2023-04-07 DIAGNOSIS — R65.20 SEVERE SEPSIS: Primary | ICD-10-CM

## 2023-04-07 DIAGNOSIS — A41.9 SEVERE SEPSIS: Primary | ICD-10-CM

## 2023-04-07 PROCEDURE — 96365 THER/PROPH/DIAG IV INF INIT: CPT

## 2023-04-07 PROCEDURE — 25010000002 DAPTOMYCIN PER 1 MG: Performed by: INTERNAL MEDICINE

## 2023-04-07 RX ADMIN — DAPTOMYCIN 650 MG: 500 INJECTION, POWDER, LYOPHILIZED, FOR SOLUTION INTRAVENOUS at 14:03

## 2023-04-08 ENCOUNTER — HOSPITAL ENCOUNTER (OUTPATIENT)
Dept: INFUSION THERAPY | Facility: HOSPITAL | Age: 28
Discharge: HOME | End: 2023-04-08
Admitting: NURSE PRACTITIONER
Payer: MEDICAID

## 2023-04-08 VITALS
OXYGEN SATURATION: 99 % | TEMPERATURE: 98.6 F | SYSTOLIC BLOOD PRESSURE: 116 MMHG | DIASTOLIC BLOOD PRESSURE: 93 MMHG | HEART RATE: 106 BPM

## 2023-04-08 DIAGNOSIS — R65.20 SEVERE SEPSIS: Primary | ICD-10-CM

## 2023-04-08 DIAGNOSIS — A41.9 SEVERE SEPSIS: Primary | ICD-10-CM

## 2023-04-08 PROCEDURE — 25010000002 DAPTOMYCIN PER 1 MG: Performed by: INTERNAL MEDICINE

## 2023-04-08 PROCEDURE — 96365 THER/PROPH/DIAG IV INF INIT: CPT

## 2023-04-08 PROCEDURE — 36415 COLL VENOUS BLD VENIPUNCTURE: CPT

## 2023-04-08 RX ADMIN — DAPTOMYCIN 650 MG: 500 INJECTION, POWDER, LYOPHILIZED, FOR SOLUTION INTRAVENOUS at 09:35

## 2023-04-09 ENCOUNTER — HOSPITAL ENCOUNTER (OUTPATIENT)
Dept: INFUSION THERAPY | Facility: HOSPITAL | Age: 28
Discharge: HOME | End: 2023-04-09
Admitting: NURSE PRACTITIONER
Payer: MEDICAID

## 2023-04-09 VITALS
OXYGEN SATURATION: 99 % | TEMPERATURE: 97.6 F | SYSTOLIC BLOOD PRESSURE: 121 MMHG | DIASTOLIC BLOOD PRESSURE: 55 MMHG | HEART RATE: 70 BPM

## 2023-04-09 DIAGNOSIS — A41.9 SEVERE SEPSIS: Primary | ICD-10-CM

## 2023-04-09 DIAGNOSIS — R65.20 SEVERE SEPSIS: Primary | ICD-10-CM

## 2023-04-09 PROCEDURE — 36415 COLL VENOUS BLD VENIPUNCTURE: CPT

## 2023-04-09 PROCEDURE — 96365 THER/PROPH/DIAG IV INF INIT: CPT

## 2023-04-09 PROCEDURE — 25010000002 DAPTOMYCIN PER 1 MG: Performed by: INTERNAL MEDICINE

## 2023-04-09 RX ADMIN — DAPTOMYCIN 650 MG: 500 INJECTION, POWDER, LYOPHILIZED, FOR SOLUTION INTRAVENOUS at 09:20

## 2023-04-10 ENCOUNTER — HOSPITAL ENCOUNTER (OUTPATIENT)
Dept: INFUSION THERAPY | Facility: HOSPITAL | Age: 28
Discharge: HOME | End: 2023-04-10
Admitting: NURSE PRACTITIONER
Payer: MEDICAID

## 2023-04-10 VITALS
SYSTOLIC BLOOD PRESSURE: 108 MMHG | TEMPERATURE: 98.2 F | DIASTOLIC BLOOD PRESSURE: 67 MMHG | OXYGEN SATURATION: 98 % | HEART RATE: 72 BPM

## 2023-04-10 DIAGNOSIS — A41.9 SEVERE SEPSIS: Primary | ICD-10-CM

## 2023-04-10 DIAGNOSIS — R65.20 SEVERE SEPSIS: Primary | ICD-10-CM

## 2023-04-10 PROCEDURE — 25010000002 DAPTOMYCIN PER 1 MG: Performed by: INTERNAL MEDICINE

## 2023-04-10 PROCEDURE — 96365 THER/PROPH/DIAG IV INF INIT: CPT

## 2023-04-10 RX ADMIN — DAPTOMYCIN 650 MG: 500 INJECTION, POWDER, LYOPHILIZED, FOR SOLUTION INTRAVENOUS at 12:35

## 2023-04-11 ENCOUNTER — HOSPITAL ENCOUNTER (OUTPATIENT)
Dept: INFUSION THERAPY | Facility: HOSPITAL | Age: 28
Discharge: HOME | End: 2023-04-11
Admitting: NURSE PRACTITIONER
Payer: MEDICAID

## 2023-04-11 VITALS
DIASTOLIC BLOOD PRESSURE: 70 MMHG | TEMPERATURE: 97.8 F | RESPIRATION RATE: 16 BRPM | HEART RATE: 84 BPM | OXYGEN SATURATION: 98 % | SYSTOLIC BLOOD PRESSURE: 117 MMHG

## 2023-04-11 DIAGNOSIS — R65.20 SEVERE SEPSIS: Primary | ICD-10-CM

## 2023-04-11 DIAGNOSIS — A41.9 SEVERE SEPSIS: Primary | ICD-10-CM

## 2023-04-11 PROCEDURE — 25010000002 DAPTOMYCIN PER 1 MG: Performed by: INTERNAL MEDICINE

## 2023-04-11 PROCEDURE — 96365 THER/PROPH/DIAG IV INF INIT: CPT

## 2023-04-11 PROCEDURE — 36415 COLL VENOUS BLD VENIPUNCTURE: CPT

## 2023-04-11 RX ADMIN — DAPTOMYCIN 650 MG: 500 INJECTION, POWDER, LYOPHILIZED, FOR SOLUTION INTRAVENOUS at 13:23

## 2023-04-12 ENCOUNTER — HOSPITAL ENCOUNTER (OUTPATIENT)
Dept: INFUSION THERAPY | Facility: HOSPITAL | Age: 28
Discharge: HOME | End: 2023-04-12
Admitting: NURSE PRACTITIONER
Payer: MEDICAID

## 2023-04-12 VITALS
OXYGEN SATURATION: 100 % | SYSTOLIC BLOOD PRESSURE: 111 MMHG | RESPIRATION RATE: 14 BRPM | HEART RATE: 87 BPM | TEMPERATURE: 99.1 F | DIASTOLIC BLOOD PRESSURE: 73 MMHG

## 2023-04-12 DIAGNOSIS — A41.9 SEVERE SEPSIS: Primary | ICD-10-CM

## 2023-04-12 DIAGNOSIS — R65.20 SEVERE SEPSIS: Primary | ICD-10-CM

## 2023-04-12 LAB
ALBUMIN SERPL-MCNC: 3.9 G/DL (ref 3.5–5.2)
ALBUMIN/GLOB SERPL: 1.3 G/DL
ALP SERPL-CCNC: 94 U/L (ref 39–117)
ALT SERPL W P-5'-P-CCNC: <5 U/L (ref 1–33)
ANION GAP SERPL CALCULATED.3IONS-SCNC: 8 MMOL/L (ref 5–15)
AST SERPL-CCNC: 11 U/L (ref 1–32)
BASOPHILS # BLD AUTO: 0 10*3/MM3 (ref 0–0.2)
BASOPHILS NFR BLD AUTO: 0.9 % (ref 0–1.5)
BILIRUB SERPL-MCNC: 0.3 MG/DL (ref 0–1.2)
BUN SERPL-MCNC: 10 MG/DL (ref 6–20)
BUN/CREAT SERPL: 11.2 (ref 7–25)
CALCIUM SPEC-SCNC: 9.1 MG/DL (ref 8.6–10.5)
CHLORIDE SERPL-SCNC: 104 MMOL/L (ref 98–107)
CK SERPL-CCNC: 61 U/L (ref 20–180)
CO2 SERPL-SCNC: 28 MMOL/L (ref 22–29)
CREAT SERPL-MCNC: 0.89 MG/DL (ref 0.57–1)
DEPRECATED RDW RBC AUTO: 49.9 FL (ref 37–54)
EGFRCR SERPLBLD CKD-EPI 2021: 91.3 ML/MIN/1.73
EOSINOPHIL # BLD AUTO: 0.3 10*3/MM3 (ref 0–0.4)
EOSINOPHIL NFR BLD AUTO: 6.4 % (ref 0.3–6.2)
ERYTHROCYTE [DISTWIDTH] IN BLOOD BY AUTOMATED COUNT: 16.2 % (ref 12.3–15.4)
GLOBULIN UR ELPH-MCNC: 3.1 GM/DL
GLUCOSE SERPL-MCNC: 77 MG/DL (ref 65–99)
HCT VFR BLD AUTO: 34.4 % (ref 34–46.6)
HGB BLD-MCNC: 10.8 G/DL (ref 12–15.9)
LYMPHOCYTES # BLD AUTO: 1.9 10*3/MM3 (ref 0.7–3.1)
LYMPHOCYTES NFR BLD AUTO: 38.2 % (ref 19.6–45.3)
MCH RBC QN AUTO: 27.6 PG (ref 26.6–33)
MCHC RBC AUTO-ENTMCNC: 31.5 G/DL (ref 31.5–35.7)
MCV RBC AUTO: 87.8 FL (ref 79–97)
MONOCYTES # BLD AUTO: 0.3 10*3/MM3 (ref 0.1–0.9)
MONOCYTES NFR BLD AUTO: 6.9 % (ref 5–12)
NEUTROPHILS NFR BLD AUTO: 2.4 10*3/MM3 (ref 1.7–7)
NEUTROPHILS NFR BLD AUTO: 47.6 % (ref 42.7–76)
NRBC BLD AUTO-RTO: 0 /100 WBC (ref 0–0.2)
PLATELET # BLD AUTO: 256 10*3/MM3 (ref 140–450)
PMV BLD AUTO: 9.2 FL (ref 6–12)
POTASSIUM SERPL-SCNC: 3.6 MMOL/L (ref 3.5–5.2)
PROT SERPL-MCNC: 7 G/DL (ref 6–8.5)
RBC # BLD AUTO: 3.92 10*6/MM3 (ref 3.77–5.28)
SODIUM SERPL-SCNC: 140 MMOL/L (ref 136–145)
WBC NRBC COR # BLD: 5 10*3/MM3 (ref 3.4–10.8)

## 2023-04-12 PROCEDURE — 25010000002 DAPTOMYCIN PER 1 MG: Performed by: INTERNAL MEDICINE

## 2023-04-12 PROCEDURE — 82550 ASSAY OF CK (CPK): CPT | Performed by: INTERNAL MEDICINE

## 2023-04-12 PROCEDURE — 96365 THER/PROPH/DIAG IV INF INIT: CPT

## 2023-04-12 PROCEDURE — 85025 COMPLETE CBC W/AUTO DIFF WBC: CPT | Performed by: INTERNAL MEDICINE

## 2023-04-12 PROCEDURE — 80053 COMPREHEN METABOLIC PANEL: CPT | Performed by: INTERNAL MEDICINE

## 2023-04-12 RX ADMIN — DAPTOMYCIN 650 MG: 500 INJECTION, POWDER, LYOPHILIZED, FOR SOLUTION INTRAVENOUS at 13:22

## 2023-04-13 ENCOUNTER — HOSPITAL ENCOUNTER (OUTPATIENT)
Dept: INFUSION THERAPY | Facility: HOSPITAL | Age: 28
Discharge: HOME | End: 2023-04-13
Admitting: NURSE PRACTITIONER
Payer: MEDICAID

## 2023-04-13 VITALS
OXYGEN SATURATION: 100 % | SYSTOLIC BLOOD PRESSURE: 101 MMHG | TEMPERATURE: 96.8 F | DIASTOLIC BLOOD PRESSURE: 69 MMHG | HEART RATE: 68 BPM

## 2023-04-13 DIAGNOSIS — A41.9 SEVERE SEPSIS: Primary | ICD-10-CM

## 2023-04-13 DIAGNOSIS — R65.20 SEVERE SEPSIS: Primary | ICD-10-CM

## 2023-04-13 PROCEDURE — 36415 COLL VENOUS BLD VENIPUNCTURE: CPT

## 2023-04-13 PROCEDURE — 96365 THER/PROPH/DIAG IV INF INIT: CPT

## 2023-04-13 PROCEDURE — 25010000002 DAPTOMYCIN PER 1 MG: Performed by: INTERNAL MEDICINE

## 2023-04-13 RX ADMIN — DAPTOMYCIN 650 MG: 500 INJECTION, POWDER, LYOPHILIZED, FOR SOLUTION INTRAVENOUS at 12:20

## 2023-04-14 ENCOUNTER — HOSPITAL ENCOUNTER (OUTPATIENT)
Dept: INFUSION THERAPY | Facility: HOSPITAL | Age: 28
Discharge: HOME | End: 2023-04-14
Admitting: NURSE PRACTITIONER
Payer: MEDICAID

## 2023-04-14 DIAGNOSIS — A41.9 SEVERE SEPSIS: Primary | ICD-10-CM

## 2023-04-14 DIAGNOSIS — R65.20 SEVERE SEPSIS: Primary | ICD-10-CM

## 2023-04-14 PROCEDURE — 96365 THER/PROPH/DIAG IV INF INIT: CPT

## 2023-04-14 PROCEDURE — 36415 COLL VENOUS BLD VENIPUNCTURE: CPT

## 2023-04-14 PROCEDURE — 25010000002 DAPTOMYCIN PER 1 MG: Performed by: INTERNAL MEDICINE

## 2023-04-14 RX ADMIN — DAPTOMYCIN 650 MG: 500 INJECTION, POWDER, LYOPHILIZED, FOR SOLUTION INTRAVENOUS at 13:57

## 2023-04-15 ENCOUNTER — HOSPITAL ENCOUNTER (OUTPATIENT)
Dept: INFUSION THERAPY | Facility: HOSPITAL | Age: 28
Discharge: HOME | End: 2023-04-15
Admitting: NURSE PRACTITIONER
Payer: MEDICAID

## 2023-04-15 VITALS
SYSTOLIC BLOOD PRESSURE: 109 MMHG | OXYGEN SATURATION: 98 % | DIASTOLIC BLOOD PRESSURE: 62 MMHG | HEART RATE: 69 BPM | TEMPERATURE: 97.9 F

## 2023-04-15 DIAGNOSIS — A41.9 SEVERE SEPSIS: Primary | ICD-10-CM

## 2023-04-15 DIAGNOSIS — R65.20 SEVERE SEPSIS: Primary | ICD-10-CM

## 2023-04-15 PROCEDURE — 96365 THER/PROPH/DIAG IV INF INIT: CPT

## 2023-04-15 PROCEDURE — 25010000002 DAPTOMYCIN PER 1 MG: Performed by: INTERNAL MEDICINE

## 2023-04-15 RX ADMIN — DAPTOMYCIN 650 MG: 500 INJECTION, POWDER, LYOPHILIZED, FOR SOLUTION INTRAVENOUS at 09:41

## 2023-04-16 ENCOUNTER — TELEPHONE (OUTPATIENT)
Dept: INFUSION THERAPY | Facility: HOSPITAL | Age: 28
End: 2023-04-16
Payer: MEDICAID

## 2023-04-16 NOTE — TELEPHONE ENCOUNTER
Attempted to contact pt for appointment on 4/16 phone is out of service. Pt did not arrive for scheduled appointment on this date. 0950am

## 2023-04-17 ENCOUNTER — HOSPITAL ENCOUNTER (OUTPATIENT)
Dept: INFUSION THERAPY | Facility: HOSPITAL | Age: 28
Discharge: HOME | End: 2023-04-17
Admitting: NURSE PRACTITIONER
Payer: MEDICAID

## 2023-04-17 VITALS
RESPIRATION RATE: 14 BRPM | TEMPERATURE: 99 F | DIASTOLIC BLOOD PRESSURE: 68 MMHG | SYSTOLIC BLOOD PRESSURE: 111 MMHG | OXYGEN SATURATION: 97 % | HEART RATE: 96 BPM

## 2023-04-17 DIAGNOSIS — A41.9 SEVERE SEPSIS: Primary | ICD-10-CM

## 2023-04-17 DIAGNOSIS — R65.20 SEVERE SEPSIS: Primary | ICD-10-CM

## 2023-04-17 PROCEDURE — 96365 THER/PROPH/DIAG IV INF INIT: CPT

## 2023-04-17 PROCEDURE — 25010000002 DAPTOMYCIN PER 1 MG: Performed by: INTERNAL MEDICINE

## 2023-04-17 RX ADMIN — DAPTOMYCIN 650 MG: 500 INJECTION, POWDER, LYOPHILIZED, FOR SOLUTION INTRAVENOUS at 13:46

## 2023-04-18 ENCOUNTER — HOSPITAL ENCOUNTER (OUTPATIENT)
Dept: INFUSION THERAPY | Facility: HOSPITAL | Age: 28
Discharge: HOME | End: 2023-04-18
Admitting: NURSE PRACTITIONER
Payer: MEDICAID

## 2023-04-18 VITALS
SYSTOLIC BLOOD PRESSURE: 116 MMHG | RESPIRATION RATE: 16 BRPM | DIASTOLIC BLOOD PRESSURE: 42 MMHG | TEMPERATURE: 98.4 F | OXYGEN SATURATION: 100 % | HEART RATE: 89 BPM

## 2023-04-18 DIAGNOSIS — R65.20 SEVERE SEPSIS: Primary | ICD-10-CM

## 2023-04-18 DIAGNOSIS — A41.9 SEVERE SEPSIS: Primary | ICD-10-CM

## 2023-04-18 PROCEDURE — 96365 THER/PROPH/DIAG IV INF INIT: CPT

## 2023-04-18 PROCEDURE — 25010000002 DAPTOMYCIN PER 1 MG: Performed by: INTERNAL MEDICINE

## 2023-04-18 PROCEDURE — 36415 COLL VENOUS BLD VENIPUNCTURE: CPT

## 2023-04-18 RX ADMIN — DAPTOMYCIN 650 MG: 500 INJECTION, POWDER, LYOPHILIZED, FOR SOLUTION INTRAVENOUS at 12:25

## 2023-04-20 ENCOUNTER — HOSPITAL ENCOUNTER (OUTPATIENT)
Dept: INFUSION THERAPY | Facility: HOSPITAL | Age: 28
Discharge: HOME | End: 2023-04-20
Admitting: NURSE PRACTITIONER
Payer: MEDICAID

## 2023-04-20 DIAGNOSIS — A41.9 SEVERE SEPSIS: Primary | ICD-10-CM

## 2023-04-20 DIAGNOSIS — R65.20 SEVERE SEPSIS: Primary | ICD-10-CM

## 2023-04-20 PROCEDURE — G0463 HOSPITAL OUTPT CLINIC VISIT: HCPCS

## 2023-04-20 NOTE — PROGRESS NOTES
Patient in ASC today for daptomycin infusion. Three attempts made for IV access and we were unsuccessful. Patient stated she had another appointment she needed to get to and left before labs were completed or medication was infused. Pharmacy notified of unused daptomycin and medication returned to [facility] refrigerator. Patient stated she would return after her appointment but has not returned at the time of this note entry (15:37).

## 2023-05-08 ENCOUNTER — HOSPITAL ENCOUNTER (EMERGENCY)
Facility: HOSPITAL | Age: 28
Discharge: HOME | End: 2023-05-08
Attending: EMERGENCY MEDICINE | Admitting: EMERGENCY MEDICINE
Payer: MEDICAID

## 2023-05-08 ENCOUNTER — APPOINTMENT (OUTPATIENT)
Dept: GENERAL RADIOLOGY | Facility: HOSPITAL | Age: 28
End: 2023-05-08
Payer: MEDICAID

## 2023-05-08 VITALS
SYSTOLIC BLOOD PRESSURE: 101 MMHG | RESPIRATION RATE: 16 BRPM | OXYGEN SATURATION: 100 % | BODY MASS INDEX: 24.16 KG/M2 | TEMPERATURE: 98 F | WEIGHT: 145 LBS | HEIGHT: 65 IN | HEART RATE: 72 BPM | DIASTOLIC BLOOD PRESSURE: 60 MMHG

## 2023-05-08 DIAGNOSIS — R07.9 CHEST PAIN, UNSPECIFIED TYPE: Primary | ICD-10-CM

## 2023-05-08 LAB
ALBUMIN SERPL-MCNC: 4.1 G/DL (ref 3.5–5.2)
ALBUMIN/GLOB SERPL: 1.7 G/DL
ALP SERPL-CCNC: 104 U/L (ref 39–117)
ALT SERPL W P-5'-P-CCNC: 7 U/L (ref 1–33)
ANION GAP SERPL CALCULATED.3IONS-SCNC: 11 MMOL/L (ref 5–15)
AST SERPL-CCNC: 14 U/L (ref 1–32)
B-HCG UR QL: NEGATIVE
BASOPHILS # BLD AUTO: 0 10*3/MM3 (ref 0–0.2)
BASOPHILS NFR BLD AUTO: 0.6 % (ref 0–1.5)
BILIRUB SERPL-MCNC: 0.2 MG/DL (ref 0–1.2)
BUN SERPL-MCNC: 16 MG/DL (ref 6–20)
BUN/CREAT SERPL: 15.5 (ref 7–25)
CALCIUM SPEC-SCNC: 9 MG/DL (ref 8.6–10.5)
CHLORIDE SERPL-SCNC: 104 MMOL/L (ref 98–107)
CO2 SERPL-SCNC: 24 MMOL/L (ref 22–29)
CREAT SERPL-MCNC: 1.03 MG/DL (ref 0.57–1)
CRP SERPL-MCNC: <0.3 MG/DL (ref 0–0.5)
D DIMER PPP FEU-MCNC: 0.4 MG/L (FEU) (ref 0–0.5)
DEPRECATED RDW RBC AUTO: 53.4 FL (ref 37–54)
EGFRCR SERPLBLD CKD-EPI 2021: 76.6 ML/MIN/1.73
EOSINOPHIL # BLD AUTO: 0.1 10*3/MM3 (ref 0–0.4)
EOSINOPHIL NFR BLD AUTO: 2.4 % (ref 0.3–6.2)
ERYTHROCYTE [DISTWIDTH] IN BLOOD BY AUTOMATED COUNT: 16.4 % (ref 12.3–15.4)
ERYTHROCYTE [SEDIMENTATION RATE] IN BLOOD: 2 MM/HR (ref 0–20)
GEN 5 2HR TROPONIN T REFLEX: 7 NG/L
GLOBULIN UR ELPH-MCNC: 2.4 GM/DL
GLUCOSE SERPL-MCNC: 127 MG/DL (ref 65–99)
HCT VFR BLD AUTO: 33.6 % (ref 34–46.6)
HGB BLD-MCNC: 11 G/DL (ref 12–15.9)
HOLD SPECIMEN: NORMAL
HOLD SPECIMEN: NORMAL
LYMPHOCYTES # BLD AUTO: 1.9 10*3/MM3 (ref 0.7–3.1)
LYMPHOCYTES NFR BLD AUTO: 33.6 % (ref 19.6–45.3)
MCH RBC QN AUTO: 28.8 PG (ref 26.6–33)
MCHC RBC AUTO-ENTMCNC: 32.7 G/DL (ref 31.5–35.7)
MCV RBC AUTO: 88.1 FL (ref 79–97)
MONOCYTES # BLD AUTO: 0.5 10*3/MM3 (ref 0.1–0.9)
MONOCYTES NFR BLD AUTO: 8.9 % (ref 5–12)
NEUTROPHILS NFR BLD AUTO: 3.1 10*3/MM3 (ref 1.7–7)
NEUTROPHILS NFR BLD AUTO: 54.5 % (ref 42.7–76)
NRBC BLD AUTO-RTO: 0.1 /100 WBC (ref 0–0.2)
NT-PROBNP SERPL-MCNC: 392.9 PG/ML (ref 0–450)
PLATELET # BLD AUTO: 223 10*3/MM3 (ref 140–450)
PMV BLD AUTO: 9.4 FL (ref 6–12)
POTASSIUM SERPL-SCNC: 4 MMOL/L (ref 3.5–5.2)
PROT SERPL-MCNC: 6.5 G/DL (ref 6–8.5)
RBC # BLD AUTO: 3.81 10*6/MM3 (ref 3.77–5.28)
SODIUM SERPL-SCNC: 139 MMOL/L (ref 136–145)
TROPONIN T DELTA: -1 NG/L
TROPONIN T SERPL HS-MCNC: 8 NG/L
WBC NRBC COR # BLD: 5.8 10*3/MM3 (ref 3.4–10.8)
WHOLE BLOOD HOLD COAG: NORMAL
WHOLE BLOOD HOLD SPECIMEN: NORMAL

## 2023-05-08 PROCEDURE — 85379 FIBRIN DEGRADATION QUANT: CPT | Performed by: PHYSICIAN ASSISTANT

## 2023-05-08 PROCEDURE — 93005 ELECTROCARDIOGRAM TRACING: CPT

## 2023-05-08 PROCEDURE — 81025 URINE PREGNANCY TEST: CPT | Performed by: PHYSICIAN ASSISTANT

## 2023-05-08 PROCEDURE — 71045 X-RAY EXAM CHEST 1 VIEW: CPT

## 2023-05-08 PROCEDURE — 84484 ASSAY OF TROPONIN QUANT: CPT | Performed by: PHYSICIAN ASSISTANT

## 2023-05-08 PROCEDURE — 99284 EMERGENCY DEPT VISIT MOD MDM: CPT

## 2023-05-08 PROCEDURE — 83880 ASSAY OF NATRIURETIC PEPTIDE: CPT | Performed by: PHYSICIAN ASSISTANT

## 2023-05-08 PROCEDURE — 93005 ELECTROCARDIOGRAM TRACING: CPT | Performed by: EMERGENCY MEDICINE

## 2023-05-08 PROCEDURE — 80053 COMPREHEN METABOLIC PANEL: CPT | Performed by: PHYSICIAN ASSISTANT

## 2023-05-08 PROCEDURE — 86140 C-REACTIVE PROTEIN: CPT | Performed by: PHYSICIAN ASSISTANT

## 2023-05-08 PROCEDURE — 85652 RBC SED RATE AUTOMATED: CPT | Performed by: PHYSICIAN ASSISTANT

## 2023-05-08 PROCEDURE — 36415 COLL VENOUS BLD VENIPUNCTURE: CPT

## 2023-05-08 PROCEDURE — 85025 COMPLETE CBC W/AUTO DIFF WBC: CPT | Performed by: PHYSICIAN ASSISTANT

## 2023-05-08 RX ORDER — ASPIRIN 81 MG/1
324 TABLET, CHEWABLE ORAL ONCE
Status: COMPLETED | OUTPATIENT
Start: 2023-05-08 | End: 2023-05-08

## 2023-05-08 RX ORDER — SODIUM CHLORIDE 0.9 % (FLUSH) 0.9 %
10 SYRINGE (ML) INJECTION AS NEEDED
Status: DISCONTINUED | OUTPATIENT
Start: 2023-05-08 | End: 2023-05-08 | Stop reason: HOSPADM

## 2023-05-08 RX ADMIN — ASPIRIN 81 MG CHEWABLE TABLET 324 MG: 81 TABLET CHEWABLE at 09:55

## 2023-05-08 NOTE — DISCHARGE INSTRUCTIONS
Follow-up with your primary care provider in 3-5 days.  If you do not have a primary care provider call [phone] for help in finding one, or you may follow up with [facility] at [phone].    Follow-up with cardiology for further management of your chest pain    Return to ED for any new or worsening symptoms

## 2023-05-08 NOTE — ED PROVIDER NOTES
Subjective   History of Present Illness  Patient is a 27-year-old female [facility] significant for history of IV drug use, endocarditis, mitral and tricuspid valve replacement, and history of a pacemaker presenting to the ED with complaints of chest pain that woke her up from her sleep around 7:30 AM this morning.  She describes as an achy pressure type pain along the left side of her chest that is nonradiating.  She states is intermittent currently minimal.  She denies any significant change with exertion.  She denies any associated shortness of breath, edema, fever, upper respiratory complaints including cough or congestion, recent travel, or surgeries.  Patient states she was recently treated for endocarditis and states that she finished course of IV daptomycin.  Patient denies any rash or lesions.  No headache or visual changes.  No lightheadedness or dizziness.  No heart palpitations.        Review of Systems   Constitutional: Negative.    HENT: Negative.    Eyes: Negative for photophobia and visual disturbance.   Respiratory: Negative.    Cardiovascular: Positive for chest pain. Negative for palpitations and leg swelling.   Gastrointestinal: Negative for abdominal distention, abdominal pain, diarrhea, nausea and vomiting.   Genitourinary: Negative.    Musculoskeletal: Negative for back pain, neck pain and neck stiffness.   Skin: Negative.    Neurological: Negative.    Hematological: Negative.        Past Medical History:   Diagnosis Date   • ADHD    • Anxiety    • Asymptomatic bacteriuria    • Bipolar 1 disorder    • Candida esophagitis    • Depression    • Endocarditis    • Hepatitis C    • Hypoalbuminemia    • Hypomagnesemia    • IV drug user    • Mycotic aneurysm    • Normocytic anemia     secondary to chronic inflammation   • Thrombocytopenia     at [facility] - resolved       No Known Allergies    No past surgical history on file.    No family history on file.    Social History     Socioeconomic History   • Marital  status: Single   Tobacco Use   • Smoking status: Unknown   Vaping Use   • Vaping Use: Every day   Substance and Sexual Activity   • Drug use: Yes     Types: IV, Marijuana, Methamphetamines   • Sexual activity: Defer           Objective   Physical Exam  Vitals and nursing note reviewed.   Constitutional:       General: She is not in acute distress.     Appearance: Normal appearance. She is well-developed. She is not ill-appearing, toxic-appearing or diaphoretic.   HENT:      Head: Normocephalic and atraumatic.      Nose: Nose normal.      Mouth/Throat:      Mouth: Mucous membranes are moist.      Pharynx: Oropharynx is clear.   Eyes:      General: No scleral icterus.     Extraocular Movements: Extraocular movements intact.      Pupils: Pupils are equal, round, and reactive to light.   Cardiovascular:      Rate and Rhythm: Normal rate and regular rhythm.      Pulses: Normal pulses.      Heart sounds: No murmur heard.    No friction rub. No gallop.   Pulmonary:      Effort: Pulmonary effort is normal. No respiratory distress.      Breath sounds: Normal breath sounds. No stridor. No wheezing, rhonchi or rales.   Chest:      Chest wall: Tenderness present. No mass, lacerations, deformity or swelling.      Comments: Surgical scar noted from prior cardiac surgery with no wound dehiscing.  Patient had some left-sided chest wall tenderness on exam  Abdominal:      General: Bowel sounds are normal. There is no distension. There are no signs of injury.      Palpations: Abdomen is soft.      Tenderness: There is no abdominal tenderness. There is no guarding or rebound.      Hernia: No hernia is present.   Musculoskeletal:      Cervical back: Normal range of motion. No rigidity.   Skin:     General: Skin is warm.      Capillary Refill: Capillary refill takes less than 2 seconds.      Coloration: Skin is not cyanotic, jaundiced or pale.      Findings: No rash.   Neurological:      General: No focal deficit present.      Mental  "Status: She is alert and oriented to person, place, and time.   Psychiatric:         Mood and Affect: Mood normal.         Behavior: Behavior normal.         Procedures           ED Course  ED Course as of 05/08/23 1255   Mon May 08, 2023   1119 Spoke to DUANE Whitaker on call for PCP in regards to dispo  [AA]      ED Course User Index  [AA] Poornima Cm PA      /61   Pulse 68   Temp 98 °F (36.7 °C)   Resp 16   Ht 165.1 cm (65\")   Wt 65.8 kg (145 lb)   LMP 04/25/2023 (Approximate)   SpO2 100%   BMI 24.13 kg/m²   Medications   sodium chloride 0.9 % flush 10 mL (has no administration in time range)   aspirin chewable tablet 324 mg (324 mg Oral Given 5/8/23 0955)     Labs Reviewed   COMPREHENSIVE METABOLIC PANEL - Abnormal; Notable for the following components:       Result Value    Glucose 127 (*)     Creatinine 1.03 (*)     All other components within normal limits    Narrative:     GFR Normal >60  Chronic Kidney Disease <60  Kidney Failure <15     CBC WITH AUTO DIFFERENTIAL - Abnormal; Notable for the following components:    Hemoglobin 11.0 (*)     Hematocrit 33.6 (*)     RDW 16.4 (*)     All other components within normal limits   TROPONIN - Normal    Narrative:     High Sensitive Troponin T Reference Range:  <10.0 ng/L- Negative Female for AMI  <15.0 ng/L- Negative Male for AMI  >=10 - Abnormal Female indicating possible myocardial injury.  >=15 - Abnormal Male indicating possible myocardial injury.   Clinicians would have to utilize clinical acumen, EKG, Troponin, and serial changes to determine if it is an Acute Myocardial Infarction or myocardial injury due to an underlying chronic condition.        BNP (IN-HOUSE) - Normal    Narrative:     Among patients with dyspnea, NT-proBNP is highly sensitive for the detection of acute congestive heart failure. In addition NT-proBNP of <300 pg/ml effectively rules out acute congestive heart failure with 99% negative predictive value.     D-DIMER, " "QUANTITATIVE - Normal    Narrative:     According to the assay 's published package insert, a normal (<0.50 mg/L (FEU)) D-dimer result in conjunction with a non-high clinical probability assessment, excludes deep vein thrombosis (DVT) and pulmonary embolism (PE) with high sensitivity.    D-dimer values increase with age and this can make VTE exclusion of an older population difficult. To address this, the [employer], based on best available evidence and recent guidelines, recommends that clinicians use age-adjusted D-dimer thresholds in patients greater than 50 years of age with: a) a low probability of PE who do not meet all Pulmonary Embolism Rule Out Criteria, or b) in those with intermediate probability of PE.   The formula for an age-adjusted D-dimer cut-off is \"age/100\".  For example, a 60 year old patient would have an age-adjusted cut-off of 0.60 mg/L (FEU) and an 80 year old 0.80 mg/L (FEU).   PREGNANCY, URINE - Normal   SEDIMENTATION RATE - Normal   C-REACTIVE PROTEIN - Normal   HIGH SENSITIVITIY TROPONIN T 2HR - Normal    Narrative:     High Sensitive Troponin T Reference Range:  <10.0 ng/L- Negative Female for AMI  <15.0 ng/L- Negative Male for AMI  >=10 - Abnormal Female indicating possible myocardial injury.  >=15 - Abnormal Male indicating possible myocardial injury.   Clinicians would have to utilize clinical acumen, EKG, Troponin, and serial changes to determine if it is an Acute Myocardial Infarction or myocardial injury due to an underlying chronic condition.        RAINBOW DRAW    Narrative:     The following orders were created for panel order [place] Draw.  Procedure                               Abnormality         Status                     ---------                               -----------         ------                     Green Top (Gel)[[id number]]                                  Final result               Lavender Top[[id number]]                            "          Final result               Gold Top - SST[[id number]]                                   Final result               Light Blue Top[[id number]]                                   Final result                 Please view results for these tests on the individual orders.   CBC AND DIFFERENTIAL    Narrative:     The following orders were created for panel order CBC & Differential.  Procedure                               Abnormality         Status                     ---------                               -----------         ------                     CBC Auto Differential[[id number]]        Abnormal            Final result                 Please view results for these tests on the individual orders.   GREEN TOP   LAVENDER TOP   GOLD TOP - SST   LIGHT BLUE TOP     XR Chest 1 View    Result Date: 5/8/2023  Impression: No acute chest findings. Electronically Signed: Kate Green  5/8/2023 10:10 AM EDT  Workstation ID: [id number]                    HEART Score: 2                      Medical Decision Making  Chart Review:   -Mitral and tricuspid valve replacement and pacemaker placement on 8/10/22 [facility].  -Note reviewed from 4/5/2023 patient had echocardiogram ordered through cardiothoracic surgeon Dr. Lieberman but they do not take her insurance so this was canceled  -I see multiple canceled infusion appointments for no-shows through the month of April    Comorbidity: As per past medical history  Differentials: ACS, electrolyte abnormality, PE, endocarditis, chest wall strain    ;this list is not all inclusive and does not constitute the entirety of considered causes  EKG: Interpreted by myself and Dr. Fu shows sinus rhythm rate 95 probable patient enlargement previous EKG reviewed from 3/7/2023  Labs: As above  Radiology: My interpretation of chest x-ray shows no acute infiltrates or pneumothorax correlated with radiologist interpretation as below  XR Chest 1 View   Final Result     Impression:    No acute chest findings.            Electronically Signed: Kate Green      5/8/2023 10:10 AM EDT      Workstation ID: [id number]    Disposition/Treatment:  Appropriate PPE was worn during exam and throughout all encounters with the patient.  When the ED IV was placed and labs were obtained patient was placed on proper monitor she had presented with complaints of acute chest pain that started today.  Patient was given aspirin while in the ED.  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    Work-up today included EKG that was unremarkable as above.  Serial troponins unremarkable.  BNP and D-dimer normal.  CBC showed a normal white count hemoglobin of 11 does have a history of anemia chronic and stable.  Metabolic panel showed glucose 127 creatinine 1.03 otherwise unremarkable.  Chest x-ray showed no acute process.  Today's work-up in the ED was fairly unremarkable no signs of severe infection, electrolyte abnormality, dehydration, acute ACS was ruled out with serial troponins and EKG, PE was also considered but thought to be less likely as she is not tachycardic hypoxic and has a normal D-dimer.  Heart score low as above.      Case was discussed with DUANE Whitaker on-call for patient's PCP who recommended patient being discharged and follow-up in the office along with referral to cardiology.  Findings were discussed with the patient and upon reassessment she is resting quietly is in agreement with plan.  Patient voiced worsening of discharge along with signs and symptoms to return all questions were answered.    This document is intended for medical expert use only. Reading of this document by patients and/or patient's family without participating medical staff guidance may result in misinterpretation and unintended morbidity.  Any interpretation of such data is the responsibility of the patient and/or  family member responsible for the patient in concert with their primary or specialist providers, not to be left for sources of online searches such as [facility], [employer] or similar queries. Relying on these approaches to knowledge may result in misinterpretation, misguided goals of care and even death should patients or family members try recommendations outside of the realm of professional medical care in a supervised inpatient environment.       Chest pain, unspecified type: acute illness or injury  Amount and/or Complexity of Data Reviewed  External Data Reviewed: labs and notes.  Labs: ordered. Decision-making details documented in ED Course.  Radiology: ordered. Decision-making details documented in ED Course.  ECG/medicine tests: ordered.  Discussion of management or test interpretation with external provider(s): As above    Risk  OTC drugs.  Prescription drug management.          Final diagnoses:   Chest pain, unspecified type       ED Disposition  ED Disposition     ED Disposition   Discharge    Condition   Stable    Comment   --             Gokul Santos MD  [address] [facility] [address] [place] [address] [phone]    Schedule an appointment as soon as possible for a visit in 3 days      [facility] EMERGENCY DEPARTMENT  [address] [address] [phone]  Go to   If symptoms worsen    Walter Reyes MD  [address] [facility] [facility] [place] [address] [phone]    Schedule an appointment as soon as possible for a visit   For further evaluation management of her chest pain as needed         Medication List      No changes were made to your prescriptions during this visit.          Poornima Cm PA  05/08/23 6059

## 2023-05-08 NOTE — Clinical Note
[facility] EMERGENCY DEPARTMENT  [address] [facility] [place] [address]  Phone: [phone]    Ani Fields was seen and treated in our emergency department on 5/8/2023.  She may return to work on 05/09/2023.         Thank you for choosing [facility].    Poornima Cm PA

## 2023-05-09 LAB — QT INTERVAL: 377 MS

## 2023-05-12 ENCOUNTER — APPOINTMENT (OUTPATIENT)
Dept: GENERAL RADIOLOGY | Facility: HOSPITAL | Age: 28
End: 2023-05-12
Payer: MEDICAID

## 2023-05-12 ENCOUNTER — HOSPITAL ENCOUNTER (EMERGENCY)
Facility: HOSPITAL | Age: 28
Discharge: HOME | End: 2023-05-12
Attending: EMERGENCY MEDICINE
Payer: MEDICAID

## 2023-05-12 VITALS
BODY MASS INDEX: 25.45 KG/M2 | OXYGEN SATURATION: 99 % | HEIGHT: 65 IN | RESPIRATION RATE: 18 BRPM | WEIGHT: 152.78 LBS | HEART RATE: 60 BPM | DIASTOLIC BLOOD PRESSURE: 65 MMHG | TEMPERATURE: 98.1 F | SYSTOLIC BLOOD PRESSURE: 106 MMHG

## 2023-05-12 DIAGNOSIS — R07.9 CHEST PAIN, UNSPECIFIED TYPE: Primary | ICD-10-CM

## 2023-05-12 LAB
ALBUMIN SERPL-MCNC: 3.9 G/DL (ref 3.5–5.2)
ALBUMIN/GLOB SERPL: 1.6 G/DL
ALP SERPL-CCNC: 92 U/L (ref 39–117)
ALT SERPL W P-5'-P-CCNC: 5 U/L (ref 1–33)
AMPHET+METHAMPHET UR QL: NEGATIVE
ANION GAP SERPL CALCULATED.3IONS-SCNC: 11 MMOL/L (ref 5–15)
AST SERPL-CCNC: 12 U/L (ref 1–32)
B-HCG UR QL: NEGATIVE
BACTERIA UR QL AUTO: ABNORMAL /HPF
BARBITURATES UR QL SCN: NEGATIVE
BASOPHILS # BLD AUTO: 0 10*3/MM3 (ref 0–0.2)
BASOPHILS NFR BLD AUTO: 0.6 % (ref 0–1.5)
BENZODIAZ UR QL SCN: NEGATIVE
BILIRUB SERPL-MCNC: <0.2 MG/DL (ref 0–1.2)
BILIRUB UR QL STRIP: NEGATIVE
BUN SERPL-MCNC: 9 MG/DL (ref 6–20)
BUN/CREAT SERPL: 12 (ref 7–25)
CALCIUM SPEC-SCNC: 8.6 MG/DL (ref 8.6–10.5)
CANNABINOIDS SERPL QL: POSITIVE
CHLORIDE SERPL-SCNC: 109 MMOL/L (ref 98–107)
CLARITY UR: ABNORMAL
CO2 SERPL-SCNC: 22 MMOL/L (ref 22–29)
COCAINE UR QL: NEGATIVE
COLOR UR: YELLOW
CREAT SERPL-MCNC: 0.75 MG/DL (ref 0.57–1)
CRP SERPL-MCNC: <0.3 MG/DL (ref 0–0.5)
D DIMER PPP FEU-MCNC: 0.45 MG/L (FEU) (ref 0–0.5)
DEPRECATED RDW RBC AUTO: 51.6 FL (ref 37–54)
EGFRCR SERPLBLD CKD-EPI 2021: 112.1 ML/MIN/1.73
EOSINOPHIL # BLD AUTO: 0.1 10*3/MM3 (ref 0–0.4)
EOSINOPHIL NFR BLD AUTO: 1.5 % (ref 0.3–6.2)
ERYTHROCYTE [DISTWIDTH] IN BLOOD BY AUTOMATED COUNT: 16 % (ref 12.3–15.4)
ERYTHROCYTE [SEDIMENTATION RATE] IN BLOOD: <1 MM/HR (ref 0–20)
GEN 5 2HR TROPONIN T REFLEX: 8 NG/L
GLOBULIN UR ELPH-MCNC: 2.4 GM/DL
GLUCOSE SERPL-MCNC: 105 MG/DL (ref 65–99)
GLUCOSE UR STRIP-MCNC: NEGATIVE MG/DL
HCT VFR BLD AUTO: 33.5 % (ref 34–46.6)
HGB BLD-MCNC: 10.6 G/DL (ref 12–15.9)
HGB UR QL STRIP.AUTO: ABNORMAL
HYALINE CASTS UR QL AUTO: ABNORMAL /LPF
KETONES UR QL STRIP: NEGATIVE
LEUKOCYTE ESTERASE UR QL STRIP.AUTO: ABNORMAL
LYMPHOCYTES # BLD AUTO: 1.6 10*3/MM3 (ref 0.7–3.1)
LYMPHOCYTES NFR BLD AUTO: 27.7 % (ref 19.6–45.3)
MCH RBC QN AUTO: 28.9 PG (ref 26.6–33)
MCHC RBC AUTO-ENTMCNC: 31.8 G/DL (ref 31.5–35.7)
MCV RBC AUTO: 91.1 FL (ref 79–97)
METHADONE UR QL SCN: NEGATIVE
MONOCYTES # BLD AUTO: 0.4 10*3/MM3 (ref 0.1–0.9)
MONOCYTES NFR BLD AUTO: 6.3 % (ref 5–12)
NEUTROPHILS NFR BLD AUTO: 3.6 10*3/MM3 (ref 1.7–7)
NEUTROPHILS NFR BLD AUTO: 63.9 % (ref 42.7–76)
NITRITE UR QL STRIP: NEGATIVE
NRBC BLD AUTO-RTO: 0 /100 WBC (ref 0–0.2)
NT-PROBNP SERPL-MCNC: 409.9 PG/ML (ref 0–450)
OPIATES UR QL: NEGATIVE
OXYCODONE UR QL SCN: NEGATIVE
PH UR STRIP.AUTO: 6.5 [PH] (ref 5–8)
PLATELET # BLD AUTO: 220 10*3/MM3 (ref 140–450)
PMV BLD AUTO: 9.8 FL (ref 6–12)
POTASSIUM SERPL-SCNC: 3.8 MMOL/L (ref 3.5–5.2)
PROT SERPL-MCNC: 6.3 G/DL (ref 6–8.5)
PROT UR QL STRIP: NEGATIVE
RBC # BLD AUTO: 3.68 10*6/MM3 (ref 3.77–5.28)
RBC # UR STRIP: ABNORMAL /HPF
REF LAB TEST METHOD: ABNORMAL
SODIUM SERPL-SCNC: 142 MMOL/L (ref 136–145)
SP GR UR STRIP: 1.02 (ref 1–1.03)
SQUAMOUS #/AREA URNS HPF: ABNORMAL /HPF
TROPONIN T DELTA: NORMAL
TROPONIN T SERPL HS-MCNC: <6 NG/L
UROBILINOGEN UR QL STRIP: ABNORMAL
WBC # UR STRIP: ABNORMAL /HPF
WBC NRBC COR # BLD: 5.7 10*3/MM3 (ref 3.4–10.8)

## 2023-05-12 PROCEDURE — 87040 BLOOD CULTURE FOR BACTERIA: CPT | Performed by: NURSE PRACTITIONER

## 2023-05-12 PROCEDURE — 36415 COLL VENOUS BLD VENIPUNCTURE: CPT | Performed by: NURSE PRACTITIONER

## 2023-05-12 PROCEDURE — 86140 C-REACTIVE PROTEIN: CPT | Performed by: NURSE PRACTITIONER

## 2023-05-12 PROCEDURE — 93005 ELECTROCARDIOGRAM TRACING: CPT

## 2023-05-12 PROCEDURE — 80307 DRUG TEST PRSMV CHEM ANLYZR: CPT | Performed by: NURSE PRACTITIONER

## 2023-05-12 PROCEDURE — 85025 COMPLETE CBC W/AUTO DIFF WBC: CPT | Performed by: NURSE PRACTITIONER

## 2023-05-12 PROCEDURE — 81001 URINALYSIS AUTO W/SCOPE: CPT | Performed by: NURSE PRACTITIONER

## 2023-05-12 PROCEDURE — 71045 X-RAY EXAM CHEST 1 VIEW: CPT

## 2023-05-12 PROCEDURE — 85652 RBC SED RATE AUTOMATED: CPT | Performed by: NURSE PRACTITIONER

## 2023-05-12 PROCEDURE — 93005 ELECTROCARDIOGRAM TRACING: CPT | Performed by: EMERGENCY MEDICINE

## 2023-05-12 PROCEDURE — 84484 ASSAY OF TROPONIN QUANT: CPT | Performed by: NURSE PRACTITIONER

## 2023-05-12 PROCEDURE — 99284 EMERGENCY DEPT VISIT MOD MDM: CPT

## 2023-05-12 PROCEDURE — 85379 FIBRIN DEGRADATION QUANT: CPT | Performed by: NURSE PRACTITIONER

## 2023-05-12 PROCEDURE — 83880 ASSAY OF NATRIURETIC PEPTIDE: CPT | Performed by: NURSE PRACTITIONER

## 2023-05-12 PROCEDURE — 81025 URINE PREGNANCY TEST: CPT | Performed by: NURSE PRACTITIONER

## 2023-05-12 PROCEDURE — 80053 COMPREHEN METABOLIC PANEL: CPT | Performed by: NURSE PRACTITIONER

## 2023-05-12 RX ORDER — SODIUM CHLORIDE 0.9 % (FLUSH) 0.9 %
10 SYRINGE (ML) INJECTION AS NEEDED
Status: DISCONTINUED | OUTPATIENT
Start: 2023-05-12 | End: 2023-05-13 | Stop reason: HOSPADM

## 2023-05-12 NOTE — Clinical Note
[facility] EMERGENCY DEPARTMENT  [address] [facility] [place] [address]  Phone: [phone]    Ani Fields was seen and treated in our emergency department on 5/12/2023.  She may return to work on 05/13/2023.         Thank you for choosing [facility].    Ramon Judd, DO

## 2023-05-12 NOTE — ED PROVIDER NOTES
Subjective    Chief Complaint   Patient presents with   • Chest Pain     Chest pain,  some soa, pt states sharp pain in chest.  Pt also states swelling to ankles       Gokul Santos MD   Patient's last menstrual period was 04/25/2023 (approximate).      Patient is a 27-year-old female, with a complicated medical history secondary to IV drug use, hepatitis C, endocarditis with multiple valve replacements, presents to the ED with complaint of chest pain onset today.  She reports she was sitting when this occurred, not particular associated with any activity.  It is consistent in nature, nonradiating in the middle of the chest.  She reports associated lower extremity ankle swelling.  She denies shortness of breath.  No fever or chills.  She reports that she is no longer utilizing IV drugs, she reports that she last used in June 2022.  She is not having any neck pain or back pain.  No headaches.    Review of Systems   Constitutional: Negative for chills and fever.   Respiratory: Negative for shortness of breath.    Cardiovascular: Positive for chest pain and leg swelling. Negative for palpitations.   Gastrointestinal: Negative for abdominal pain, diarrhea, nausea and vomiting.   Genitourinary: Negative for dysuria.   Musculoskeletal: Negative for arthralgias, myalgias and neck pain.   Skin: Negative for color change and rash.   Neurological: Negative for dizziness, syncope, weakness and light-headedness.       Past Medical History:   Diagnosis Date   • ADHD    • Anxiety    • Asymptomatic bacteriuria    • Bipolar 1 disorder    • Candida esophagitis    • Depression    • Endocarditis    • Hepatitis C    • Hypoalbuminemia    • Hypomagnesemia    • IV drug user    • Mycotic aneurysm    • Normocytic anemia     secondary to chronic inflammation   • Thrombocytopenia     at [facility] - resolved       Allergies   Allergen Reactions   • Amoxicillin Rash       No past surgical history on file.    No family history on file.    Social  "History     Socioeconomic History   • Marital status: Single   Tobacco Use   • Smoking status: Unknown   Vaping Use   • Vaping Use: Every day   Substance and Sexual Activity   • Drug use: Yes     Types: IV, Marijuana, Methamphetamines   • Sexual activity: Defer           Objective   Physical Exam  Vitals and nursing note reviewed.   Constitutional:       Appearance: She is well-developed. She is not toxic-appearing.   HENT:      Head: Normocephalic and atraumatic.   Eyes:      Extraocular Movements: Extraocular movements intact.      Pupils: Pupils are equal, round, and reactive to light.   Cardiovascular:      Rate and Rhythm: Normal rate and regular rhythm.      Pulses:           Radial pulses are 2+ on the right side and 2+ on the left side.        Dorsalis pedis pulses are 2+ on the right side and 2+ on the left side.      Heart sounds: Normal heart sounds. No murmur heard.    No friction rub. No gallop.   Pulmonary:      Effort: Pulmonary effort is normal.      Breath sounds: Normal breath sounds.   Abdominal:      General: Bowel sounds are normal.      Palpations: Abdomen is soft.   Musculoskeletal:      Cervical back: Normal range of motion and neck supple.      Right lower leg: No tenderness. Edema present.      Left lower leg: No tenderness. Edema present.      Comments: No Janeway lesions or Osler nodes   Skin:     General: Skin is warm and dry.      Capillary Refill: Capillary refill takes less than 2 seconds.      Findings: No rash.   Neurological:      General: No focal deficit present.      Mental Status: She is alert and oriented to person, place, and time.   Psychiatric:         Mood and Affect: Mood normal.         Behavior: Behavior normal.         Procedures           ED Course  /65   Pulse 60   Temp 98.1 °F (36.7 °C) (Oral)   Resp 18   Ht 165.1 cm (65\")   Wt 69.3 kg (152 lb 12.5 oz)   LMP 04/25/2023 (Approximate)   SpO2 99%   BMI 25.42 kg/m²   Labs Reviewed   COMPREHENSIVE METABOLIC " PANEL - Abnormal; Notable for the following components:       Result Value    Glucose 105 (*)     Chloride 109 (*)     All other components within normal limits    Narrative:     GFR Normal >60  Chronic Kidney Disease <60  Kidney Failure <15     URINALYSIS W/ MICROSCOPIC IF INDICATED (NO CULTURE) - Abnormal; Notable for the following components:    Appearance, UA Slightly Cloudy (*)     Blood, UA Trace (*)     Leuk Esterase, UA Small (1+) (*)     All other components within normal limits   URINE DRUG SCREEN - Abnormal; Notable for the following components:    THC, Screen, Urine Positive (*)     All other components within normal limits    Narrative:     Negative Thresholds Per Drugs Screened:    Amphetamines                 500 ng/ml  Barbiturates                 200 ng/ml  Benzodiazepines              100 ng/ml  Cocaine                      300 ng/ml  Methadone                    300 ng/ml  Opiates                      300 ng/ml  Oxycodone                    100 ng/ml  THC                           50 ng/ml    The Normal Value for all drugs tested is negative. This report includes final unconfirmed screening results to be used for medical treatment purposes only. Unconfirmed results must not be used for non-medical purposes such as employment or legal testing. Clinical consideration should be applied to any drug of abuse test, particularly when unconfirmed results are used.          All urine drugs of abuse requests without chain of custody are for medical screening purposes only.  False positives are possible.     CBC WITH AUTO DIFFERENTIAL - Abnormal; Notable for the following components:    RBC 3.68 (*)     Hemoglobin 10.6 (*)     Hematocrit 33.5 (*)     RDW 16.0 (*)     All other components within normal limits   URINALYSIS, MICROSCOPIC ONLY - Abnormal; Notable for the following components:    RBC, UA 3-5 (*)     WBC, UA 13-20 (*)     Bacteria, UA 3+ (*)     Squamous Epithelial Cells, UA 3-6 (*)     All other  "components within normal limits   BNP (IN-HOUSE) - Normal    Narrative:     Among patients with dyspnea, NT-proBNP is highly sensitive for the detection of acute congestive heart failure. In addition NT-proBNP of <300 pg/ml effectively rules out acute congestive heart failure with 99% negative predictive value.     D-DIMER, QUANTITATIVE - Normal    Narrative:     According to the assay 's published package insert, a normal (<0.50 mg/L (FEU)) D-dimer result in conjunction with a non-high clinical probability assessment, excludes deep vein thrombosis (DVT) and pulmonary embolism (PE) with high sensitivity.    D-dimer values increase with age and this can make VTE exclusion of an older population difficult. To address this, the [employer] College [employer], based on best available evidence and recent guidelines, recommends that clinicians use age-adjusted D-dimer thresholds in patients greater than 50 years of age with: a) a low probability of PE who do not meet all Pulmonary Embolism Rule Out Criteria, or b) in those with intermediate probability of PE.   The formula for an age-adjusted D-dimer cut-off is \"age/100\".  For example, a 60 year old patient would have an age-adjusted cut-off of 0.60 mg/L (FEU) and an 80 year old 0.80 mg/L (FEU).   TROPONIN - Normal    Narrative:     High Sensitive Troponin T Reference Range:  <10.0 ng/L- Negative Female for AMI  <15.0 ng/L- Negative Male for AMI  >=10 - Abnormal Female indicating possible myocardial injury.  >=15 - Abnormal Male indicating possible myocardial injury.   Clinicians would have to utilize clinical acumen, EKG, Troponin, and serial changes to determine if it is an Acute Myocardial Infarction or myocardial injury due to an underlying chronic condition.        PREGNANCY, URINE - Normal   C-REACTIVE PROTEIN - Normal   SEDIMENTATION RATE - Normal   BLOOD CULTURE   BLOOD CULTURE   HIGH SENSITIVITIY TROPONIN T 2HR    Narrative:     High Sensitive " Troponin T Reference Range:  <10.0 ng/L- Negative Female for AMI  <15.0 ng/L- Negative Male for AMI  >=10 - Abnormal Female indicating possible myocardial injury.  >=15 - Abnormal Male indicating possible myocardial injury.   Clinicians would have to utilize clinical acumen, EKG, Troponin, and serial changes to determine if it is an Acute Myocardial Infarction or myocardial injury due to an underlying chronic condition.        CBC AND DIFFERENTIAL    Narrative:     The following orders were created for panel order CBC & Differential.  Procedure                               Abnormality         Status                     ---------                               -----------         ------                     CBC Auto Differential[[id number]]        Abnormal            Final result                 Please view results for these tests on the individual orders.     Medications   sodium chloride 0.9 % flush 10 mL (has no administration in time range)     XR Chest 1 View    Result Date: 5/12/2023  Impression: 1. No acute cardiopulmonary abnormality. Stable post cardiac surgery changes. Electronically Signed: El Dove  5/12/2023 7:52 PM EDT  Workstation ID: [id number]      ED Course as of 05/12/23 2254   Fri May 12, 2023   2231 Spoke with patient at the bedside. She refuses admission.  [LB]      ED Course User Index  [LB] Rachael Ortiz APRN                      HEART Score: 2                      MDM  Record review infectious disease 3/7/2023:      Mitral tricuspid valve replacement August 2022 at [facility] in [place].  Was admitted there for 3 months.  Treated for 1 month with IV antibiotics.  Patient had left-sided mycotic aneurysm required embolization.  She was bacteremic with MSSA.  Was treated initially with oxacillin and switch to cefazolin.   Patient is a 27-year-old female, who presented the ED with complaint of chest pain, lower extremity edema.  Patient does have quite a  complicated medical history secondary to IV drug use, endocarditis.  Patient denies any recent IV drug abuse.  Her urine drug screen was positive for THC.    CBC CMP were reviewed.  D-dimer negative.  Troponin x2 negative.  CRP and sed rate are normal.  BMP normal.  No acute changes on her EKG.  I spoke with the patient extensively at the bedside about admission versus discharge home, patient reported she will not be admitted, and she is adamant upon discharge home.  I discussed with her the importance of follow-up with cardiology, she was given cardiology information for follow-up and to call on Monday.  I also advised follow-up with PCP.  She is afebrile and nontoxic at time of disposition.  I spoke with the patient at the bedside regarding their plan of care, discharge instruction,  prescriptions, as well as reasons to return to the emergency department.  We discussed test results at the bedside, including incidental abnormal labs, radiological findings, understands need and importance  for follow-up with primary care or specialist if indicated.  Patient verbalizes understanding and agrees to the treatment plan at this time.   Pt is aware that discharge does not mean that nothing is wrong but it indicates no emergency is present and they must continue care with follow-up as given below or physician of their choice.                                                                                                                                                                                                                                      Final diagnoses:   Chest pain, unspecified type       ED Disposition  ED Disposition     ED Disposition   Discharge    Condition   Stable    Comment   --             Gokul Santos MD  [address] [facility] [place] [address] [phone]    Schedule an appointment as soon as possible for a visit       [facility] EMERGENCY DEPARTMENT  [address] [facility]  [address] [phone]    As needed, If symptoms worsen    Quang Hrenandez MD  [address] [place] [address] [place] [address] [phone]    Schedule an appointment as soon as possible for a visit   Call Monday-to schedule follow up         Medication List      No changes were made to your prescriptions during this visit.          Rachael Ortiz, APRN  05/12/23 7314

## 2023-05-13 NOTE — DISCHARGE INSTRUCTIONS
Follow-up with primary care provider, if you not have a primary care provider please utilize patient connection as above to call and establish care.  Follow-up with cardiology as above, call Monday for appointment  Return to ED for new or worsening

## 2023-05-13 NOTE — ED NOTES
First contact; pt ambulatory from waiting room for reports of intermittent 10/10 L sided chest pain radiates to L arm pit; also reports BLE edema; bypass srg in October with pacer placement due to endocarditis s/p heroin use; last use June 2022; gcs 15; very pleasant; NAD @ this time; denies chest pain @ this time; gowned and placed on continuous cardiac, SPO2, bp monitor; VSS @ this time; call light in place; CTM pending lab/xr

## 2023-05-15 LAB — QT INTERVAL: 364 MS

## 2023-05-17 LAB
BACTERIA SPEC AEROBE CULT: NORMAL
BACTERIA SPEC AEROBE CULT: NORMAL

## 2023-05-21 ENCOUNTER — APPOINTMENT (OUTPATIENT)
Dept: GENERAL RADIOLOGY | Facility: HOSPITAL | Age: 28
End: 2023-05-21
Payer: MEDICAID

## 2023-05-21 ENCOUNTER — APPOINTMENT (OUTPATIENT)
Dept: CT IMAGING | Facility: HOSPITAL | Age: 28
End: 2023-05-21
Payer: MEDICAID

## 2023-05-21 ENCOUNTER — HOSPITAL ENCOUNTER (EMERGENCY)
Facility: HOSPITAL | Age: 28
Discharge: HOME | End: 2023-05-21
Attending: STUDENT IN AN ORGANIZED HEALTH CARE EDUCATION/TRAINING PROGRAM | Admitting: EMERGENCY MEDICINE
Payer: MEDICAID

## 2023-05-21 VITALS
TEMPERATURE: 98.7 F | HEART RATE: 66 BPM | DIASTOLIC BLOOD PRESSURE: 49 MMHG | WEIGHT: 155 LBS | RESPIRATION RATE: 18 BRPM | OXYGEN SATURATION: 99 % | BODY MASS INDEX: 25.83 KG/M2 | HEIGHT: 65 IN | SYSTOLIC BLOOD PRESSURE: 95 MMHG

## 2023-05-21 DIAGNOSIS — S86.911A STRAIN OF RIGHT KNEE, INITIAL ENCOUNTER: ICD-10-CM

## 2023-05-21 DIAGNOSIS — T07.XXXA ABRASIONS OF MULTIPLE SITES: ICD-10-CM

## 2023-05-21 DIAGNOSIS — M25.511 ACUTE PAIN OF RIGHT SHOULDER: ICD-10-CM

## 2023-05-21 DIAGNOSIS — S09.90XA CLOSED HEAD INJURY, INITIAL ENCOUNTER: Primary | ICD-10-CM

## 2023-05-21 PROCEDURE — 25010000002 TETANUS-DIPHTH-ACELL PERTUSSIS 5-2.5-18.5 LF-MCG/0.5 SUSPENSION PREFILLED SYRINGE: Performed by: STUDENT IN AN ORGANIZED HEALTH CARE EDUCATION/TRAINING PROGRAM

## 2023-05-21 PROCEDURE — 70450 CT HEAD/BRAIN W/O DYE: CPT

## 2023-05-21 PROCEDURE — 73562 X-RAY EXAM OF KNEE 3: CPT

## 2023-05-21 PROCEDURE — 90471 IMMUNIZATION ADMIN: CPT | Performed by: STUDENT IN AN ORGANIZED HEALTH CARE EDUCATION/TRAINING PROGRAM

## 2023-05-21 PROCEDURE — 72125 CT NECK SPINE W/O DYE: CPT

## 2023-05-21 PROCEDURE — 73030 X-RAY EXAM OF SHOULDER: CPT

## 2023-05-21 PROCEDURE — 99284 EMERGENCY DEPT VISIT MOD MDM: CPT

## 2023-05-21 PROCEDURE — 90715 TDAP VACCINE 7 YRS/> IM: CPT | Performed by: STUDENT IN AN ORGANIZED HEALTH CARE EDUCATION/TRAINING PROGRAM

## 2023-05-21 RX ORDER — ACETAMINOPHEN 500 MG
1000 TABLET ORAL ONCE
Status: COMPLETED | OUTPATIENT
Start: 2023-05-21 | End: 2023-05-21

## 2023-05-21 RX ORDER — ACETAMINOPHEN 500 MG
1000 TABLET ORAL EVERY 6 HOURS PRN
Qty: 30 TABLET | Refills: 0 | Status: SHIPPED | OUTPATIENT
Start: 2023-05-21 | End: 2023-05-28

## 2023-05-21 RX ORDER — IBUPROFEN 800 MG/1
800 TABLET ORAL EVERY 6 HOURS PRN
Qty: 28 TABLET | Refills: 0 | Status: SHIPPED | OUTPATIENT
Start: 2023-05-21 | End: 2023-05-28

## 2023-05-21 RX ORDER — DIAPER,BRIEF,INFANT-TODD,DISP
1 EACH MISCELLANEOUS ONCE
Status: COMPLETED | OUTPATIENT
Start: 2023-05-21 | End: 2023-05-21

## 2023-05-21 RX ADMIN — TETANUS TOXOID, REDUCED DIPHTHERIA TOXOID AND ACELLULAR PERTUSSIS VACCINE, ADSORBED 0.5 ML: 5; 2.5; 8; 8; 2.5 SUSPENSION INTRAMUSCULAR at 19:27

## 2023-05-21 RX ADMIN — BACITRACIN 0.9 G: 500 OINTMENT TOPICAL at 19:50

## 2023-05-21 RX ADMIN — ACETAMINOPHEN 1000 MG: 500 TABLET, FILM COATED ORAL at 19:28
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
## 2023-05-21 NOTE — DISCHARGE INSTRUCTIONS
Rest. Apply ice for pain. Do not wear sling for longer than 5 days. Follow-up with your Doctor this week. Return if problems.
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
## 2023-05-21 NOTE — Clinical Note
[facility] [place] [id number] [place] [address] [place] [address]  Phone: [phone]    Ani Fields was seen and treated in our emergency department on 5/21/2023.  She may return to work on 05/24/2023.         Thank you for choosing [facility].    Rosi Hennessy MD

## 2023-05-22 ENCOUNTER — OFFICE VISIT (OUTPATIENT)
Dept: CARDIOLOGY | Facility: CLINIC | Age: 28
End: 2023-05-22
Payer: MEDICAID

## 2023-05-22 ENCOUNTER — CLINICAL SUPPORT NO REQUIREMENTS (OUTPATIENT)
Dept: CARDIOLOGY | Facility: CLINIC | Age: 28
End: 2023-05-22
Payer: MEDICAID

## 2023-05-22 ENCOUNTER — TELEPHONE (OUTPATIENT)
Dept: CARDIOLOGY | Facility: CLINIC | Age: 28
End: 2023-05-22

## 2023-05-22 VITALS
HEIGHT: 65 IN | WEIGHT: 149 LBS | BODY MASS INDEX: 24.83 KG/M2 | HEART RATE: 75 BPM | DIASTOLIC BLOOD PRESSURE: 61 MMHG | SYSTOLIC BLOOD PRESSURE: 100 MMHG | OXYGEN SATURATION: 99 %

## 2023-05-22 DIAGNOSIS — Z95.0 PACEMAKER: Primary | ICD-10-CM

## 2023-05-22 DIAGNOSIS — R07.9 CHEST PAIN, UNSPECIFIED TYPE: Primary | ICD-10-CM

## 2023-05-22 DIAGNOSIS — I33.0 SBE (SUBACUTE BACTERIAL ENDOCARDITIS): ICD-10-CM

## 2023-05-22 DIAGNOSIS — Z95.2 S/P MVR (MITRAL VALVE REPLACEMENT): ICD-10-CM

## 2023-05-22 DIAGNOSIS — Z95.4 S/P TVR (TRICUSPID VALVE REPLACEMENT): ICD-10-CM

## 2023-05-22 DIAGNOSIS — I49.5 SICK SINUS SYNDROME: ICD-10-CM

## 2023-05-22 PROCEDURE — 1159F MED LIST DOCD IN RCRD: CPT | Performed by: INTERNAL MEDICINE

## 2023-05-22 PROCEDURE — 1160F RVW MEDS BY RX/DR IN RCRD: CPT | Performed by: INTERNAL MEDICINE

## 2023-05-22 PROCEDURE — 99204 OFFICE O/P NEW MOD 45 MIN: CPT | Performed by: INTERNAL MEDICINE

## 2023-05-22 NOTE — TELEPHONE ENCOUNTER
Per Dr Hernandez     Does not have a recommendation for an infectious disease specialist in [place]
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
## 2023-05-22 NOTE — TELEPHONE ENCOUNTER
Do you have any recommendations for an infectious disease doctor in [place] for this patient? Due to her insurance she is not able to see a doctor in [place].

## 2023-05-22 NOTE — TELEPHONE ENCOUNTER
Pt's infectious disease referral placed by Dr Hernandez has been sent back to our office stating:    ROUTING BACK AMB. [facility] DOES NOT SCHEDULE FOR THIS MARKET    Pt was referred to see Dr Fernandez at the [facility]. Pt would need to see someone in [place] because she [place] medicaid. Please advise.
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
## 2023-06-09 ENCOUNTER — HOSPITAL ENCOUNTER (EMERGENCY)
Facility: HOSPITAL | Age: 28
Discharge: HOME | End: 2023-06-09
Attending: EMERGENCY MEDICINE
Payer: MEDICAID

## 2023-06-09 ENCOUNTER — APPOINTMENT (OUTPATIENT)
Dept: ULTRASOUND IMAGING | Facility: HOSPITAL | Age: 28
End: 2023-06-09
Payer: MEDICAID

## 2023-06-09 VITALS
SYSTOLIC BLOOD PRESSURE: 120 MMHG | HEART RATE: 74 BPM | HEIGHT: 65 IN | RESPIRATION RATE: 20 BRPM | DIASTOLIC BLOOD PRESSURE: 62 MMHG | BODY MASS INDEX: 24.68 KG/M2 | OXYGEN SATURATION: 97 % | TEMPERATURE: 98.5 F | WEIGHT: 148.15 LBS
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
## 2023-06-09 DIAGNOSIS — Z3A.01 LESS THAN 8 WEEKS GESTATION OF PREGNANCY: ICD-10-CM

## 2023-06-09 DIAGNOSIS — R10.30 LOWER ABDOMINAL PAIN: Primary | ICD-10-CM

## 2023-06-09 LAB
ABO GROUP BLD: NORMAL
ALBUMIN SERPL-MCNC: 4.4 G/DL (ref 3.5–5.2)
ALBUMIN/GLOB SERPL: 1.6 G/DL
ALP SERPL-CCNC: 97 U/L (ref 39–117)
ALT SERPL W P-5'-P-CCNC: 6 U/L (ref 1–33)
AMPHET+METHAMPHET UR QL: NEGATIVE
ANION GAP SERPL CALCULATED.3IONS-SCNC: 11 MMOL/L (ref 5–15)
AST SERPL-CCNC: 16 U/L (ref 1–32)
BACTERIA UR QL AUTO: ABNORMAL /HPF
BARBITURATES UR QL SCN: NEGATIVE
BASOPHILS # BLD AUTO: 0 10*3/MM3 (ref 0–0.2)
BASOPHILS NFR BLD AUTO: 0.7 % (ref 0–1.5)
BENZODIAZ UR QL SCN: NEGATIVE
BILIRUB SERPL-MCNC: 0.2 MG/DL (ref 0–1.2)
BILIRUB UR QL STRIP: NEGATIVE
BUN SERPL-MCNC: 9 MG/DL (ref 6–20)
BUN/CREAT SERPL: 12.2 (ref 7–25)
CALCIUM SPEC-SCNC: 9.7 MG/DL (ref 8.6–10.5)
CANNABINOIDS SERPL QL: POSITIVE
CHLORIDE SERPL-SCNC: 106 MMOL/L (ref 98–107)
CLARITY UR: ABNORMAL
CO2 SERPL-SCNC: 22 MMOL/L (ref 22–29)
COCAINE UR QL: NEGATIVE
COLOR UR: YELLOW
CREAT SERPL-MCNC: 0.74 MG/DL (ref 0.57–1)
DEPRECATED RDW RBC AUTO: 45.1 FL (ref 37–54)
EGFRCR SERPLBLD CKD-EPI 2021: 113.9 ML/MIN/1.73
EOSINOPHIL # BLD AUTO: 0 10*3/MM3 (ref 0–0.4)
EOSINOPHIL NFR BLD AUTO: 0.6 % (ref 0.3–6.2)
ERYTHROCYTE [DISTWIDTH] IN BLOOD BY AUTOMATED COUNT: 14.6 % (ref 12.3–15.4)
GLOBULIN UR ELPH-MCNC: 2.8 GM/DL
GLUCOSE SERPL-MCNC: 77 MG/DL (ref 65–99)
GLUCOSE UR STRIP-MCNC: NEGATIVE MG/DL
HCG INTACT+B SERPL-ACNC: NORMAL MIU/ML
HCT VFR BLD AUTO: 37.5 % (ref 34–46.6)
HGB BLD-MCNC: 12.1 G/DL (ref 12–15.9)
HGB F BLD QL KLEIH BETKE: NORMAL
HGB UR QL STRIP.AUTO: ABNORMAL
HYALINE CASTS UR QL AUTO: ABNORMAL /LPF
KETONES UR QL STRIP: NEGATIVE
LEUKOCYTE ESTERASE UR QL STRIP.AUTO: ABNORMAL
LYMPHOCYTES # BLD AUTO: 1.7 10*3/MM3 (ref 0.7–3.1)
LYMPHOCYTES NFR BLD AUTO: 25.9 % (ref 19.6–45.3)
MCH RBC QN AUTO: 28.4 PG (ref 26.6–33)
MCHC RBC AUTO-ENTMCNC: 32.2 G/DL (ref 31.5–35.7)
MCV RBC AUTO: 88.3 FL (ref 79–97)
METHADONE UR QL SCN: NEGATIVE
MONOCYTES # BLD AUTO: 0.4 10*3/MM3 (ref 0.1–0.9)
MONOCYTES NFR BLD AUTO: 5.6 % (ref 5–12)
NEUTROPHILS NFR BLD AUTO: 4.4 10*3/MM3 (ref 1.7–7)
NEUTROPHILS NFR BLD AUTO: 67.2 % (ref 42.7–76)
NITRITE UR QL STRIP: NEGATIVE
NRBC BLD AUTO-RTO: 0.1 /100 WBC (ref 0–0.2)
NUMBER OF DOSES: NORMAL
OPIATES UR QL: NEGATIVE
OXYCODONE UR QL SCN: NEGATIVE
PH UR STRIP.AUTO: 5.5 [PH] (ref 5–8)
PLATELET # BLD AUTO: 217 10*3/MM3 (ref 140–450)
PMV BLD AUTO: 10 FL (ref 6–12)
POTASSIUM SERPL-SCNC: 4.6 MMOL/L (ref 3.5–5.2)
PROT SERPL-MCNC: 7.2 G/DL (ref 6–8.5)
PROT UR QL STRIP: NEGATIVE
RBC # BLD AUTO: 4.25 10*6/MM3 (ref 3.77–5.28)
RBC # UR STRIP: ABNORMAL /HPF
REF LAB TEST METHOD: ABNORMAL
RH BLD: POSITIVE
SODIUM SERPL-SCNC: 139 MMOL/L (ref 136–145)
SP GR UR STRIP: 1.02 (ref 1–1.03)
SQUAMOUS #/AREA URNS HPF: ABNORMAL /HPF
UROBILINOGEN UR QL STRIP: ABNORMAL
WBC # UR STRIP: ABNORMAL /HPF
WBC NRBC COR # BLD: 6.6 10*3/MM3 (ref 3.4–10.8)

## 2023-06-09 PROCEDURE — 99282 EMERGENCY DEPT VISIT SF MDM: CPT

## 2023-06-09 PROCEDURE — 85025 COMPLETE CBC W/AUTO DIFF WBC: CPT | Performed by: NURSE PRACTITIONER

## 2023-06-09 PROCEDURE — 93976 VASCULAR STUDY: CPT

## 2023-06-09 PROCEDURE — 80307 DRUG TEST PRSMV CHEM ANLYZR: CPT | Performed by: NURSE PRACTITIONER

## 2023-06-09 PROCEDURE — 86900 BLOOD TYPING SEROLOGIC ABO: CPT | Performed by: NURSE PRACTITIONER

## 2023-06-09 PROCEDURE — 76817 TRANSVAGINAL US OBSTETRIC: CPT

## 2023-06-09 PROCEDURE — 81001 URINALYSIS AUTO W/SCOPE: CPT | Performed by: NURSE PRACTITIONER

## 2023-06-09 PROCEDURE — 85460 HEMOGLOBIN FETAL: CPT | Performed by: NURSE PRACTITIONER

## 2023-06-09 PROCEDURE — 36415 COLL VENOUS BLD VENIPUNCTURE: CPT

## 2023-06-09 PROCEDURE — 86901 BLOOD TYPING SEROLOGIC RH(D): CPT | Performed by: NURSE PRACTITIONER

## 2023-06-09 PROCEDURE — 76801 OB US < 14 WKS SINGLE FETUS: CPT

## 2023-06-09 PROCEDURE — 84702 CHORIONIC GONADOTROPIN TEST: CPT | Performed by: NURSE PRACTITIONER

## 2023-06-09 PROCEDURE — 80053 COMPREHEN METABOLIC PANEL: CPT | Performed by: NURSE PRACTITIONER

## 2023-06-09 RX ORDER — SODIUM CHLORIDE 0.9 % (FLUSH) 0.9 %
10 SYRINGE (ML) INJECTION AS NEEDED
Status: DISCONTINUED | OUTPATIENT
Start: 2023-06-09 | End: 2023-06-09 | Stop reason: HOSPADM
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
## 2023-06-10 NOTE — DISCHARGE INSTRUCTIONS
Rest through the weekend no lifting or straining vaginal rest no intercourse no tampons.  Intrauterine pregnancy 6 weeks 2 days heart rate at 99.  Small subchorionic hemorrhage is noted.  Tylenol for pain.  Return for vaginal bleeding increasing pain altered mental status vomiting dizziness passing out or any other new or worse problems or concerns.  Follow-up with your OB/GYN on Monday
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
## 2023-06-19 ENCOUNTER — OFFICE VISIT (OUTPATIENT)
Dept: CARDIOLOGY | Facility: CLINIC | Age: 28
End: 2023-06-19
Payer: MEDICAID

## 2023-06-19 VITALS
HEART RATE: 77 BPM | DIASTOLIC BLOOD PRESSURE: 55 MMHG | WEIGHT: 150 LBS | SYSTOLIC BLOOD PRESSURE: 109 MMHG | BODY MASS INDEX: 24.99 KG/M2 | HEIGHT: 65 IN | OXYGEN SATURATION: 99 %

## 2023-06-19 DIAGNOSIS — Z95.2 S/P MVR (MITRAL VALVE REPLACEMENT): Primary | ICD-10-CM

## 2023-06-19 DIAGNOSIS — Z95.0 PACEMAKER: ICD-10-CM

## 2023-06-19 DIAGNOSIS — Z95.4 S/P TVR (TRICUSPID VALVE REPLACEMENT): ICD-10-CM

## 2023-06-19 PROBLEM — I67.1 ANEURYSM OF MIDDLE CEREBRAL ARTERY: Status: ACTIVE | Noted: 2022-09-08

## 2023-06-19 PROBLEM — I38 ENDOCARDITIS: Status: ACTIVE | Noted: 2022-07-02

## 2023-06-19 PROBLEM — A41.9 SEPSIS: Status: ACTIVE | Noted: 2022-06-24

## 2023-06-19 PROBLEM — D69.6 DISORDER INVOLVING THROMBOCYTOPENIA: Status: ACTIVE | Noted: 2022-06-24

## 2023-06-19 PROBLEM — I33.0 BACTERIAL ENDOCARDITIS: Status: ACTIVE | Noted: 2022-08-22

## 2023-06-19 NOTE — PROGRESS NOTES
Subjective:     Encounter Date:06/19/2023      Patient ID: Ani Fields is a 27 y.o. female.    Chief Complaint and history of present illness:     Follow-up for permanent pacemaker, endocarditis, valve replacement    History of Present Illness:        Ms. 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  Mycotic aneurysm of left MCA  Anemia  Allergy/intolerance to amoxicillin  Every day smoker    Here for follow-up..  Patient denies any chest pain.  He is 7 weeks pregnant.  Patient is on Suboxone and reportedly stopped drugs.  Continues to smoke.      Patient was in [facility] with MSSA endocarditis due to IVDA underwent valve replacement mitral and tricuspid bioprosthetic valve replacement and permanent pacemaker implantation.  Patient was supposed to have antibiotics till 9/21/2022 and left AMA 9/14/2022.  Patient states that she completed her antibiotic course here in [place] recently.       Patient's arterial blood pressure is 109/55, heart rate 77, O2 sat of 99% on room air.    Review of records from [facility] reveal from 5/12/2023 chest x-ray is negative.  Blood cultures x2 are negative.  ESR is not normal less than 1.  proBNP is normal at 409.  CMP with a glucose of 105.  CBC with a hemoglobin of 10.6.      Assessment:  :    Permanent pacemaker  History of endocarditis  Status post MVR and TVR  Drug abuse and smoker      Recommendations / Plan:          Counseled on smoking cessation and drug abuse cessation.  Counseled on smoking cessation especially since she is pregnant.  Reviewed echo  results with patient.  We will follow-up in pacemaker clinic.    Advised patient to follow-up with primary care physician to coordinate care.          Procedures  Echocardiogram 6/19/2023 revealed normal LV systolic function with prosthetic valves and mitral and tricuspid position which are working well.    Copied text in this portion of the note has been reviewed and is accurate as of 6/19/2023  The following portions of the patient's history were reviewed and updated as appropriate: allergies, current medications, past family history, past medical history, past social history, past surgical history and problem list.    Assessment:         [facility]       Diagnosis Plan   1. S/P MVR (mitral valve replacement)        2. S/P TVR (tricuspid valve replacement)        3. Pacemaker               Plan:               Past Medical History:  Past Medical History:   Diagnosis Date    ADHD     Anxiety     Asymptomatic bacteriuria     Bipolar 1 disorder     Candida esophagitis     Depression     Endocarditis     Hepatitis C     Hypoalbuminemia     Hypomagnesemia     IV drug user     Mycotic aneurysm     Normocytic anemia     secondary to chronic inflammation    Thrombocytopenia     at [facility] - resolved     Past Surgical History:  Past Surgical History:   Procedure Laterality Date    PACEMAKER IMPLANTATION        Allergies:  Allergies   Allergen Reactions    Amoxicillin Rash     Home Meds:  Current Meds:     Current Outpatient Medications:     Buprenorphine HCl-Naloxone HCl (SUBOXONE SL), Place 8 mg under the tongue Daily., Disp: , Rfl:     Prenatal Vit-Fe Fumarate-FA (PRENATAL VITAMIN PO), Take  by mouth., Disp: , Rfl:   Social History:   Social History     Tobacco Use    Smoking status: Every Day     Packs/day: 1.00     Years: 13.00     Pack years: 13.00     Types: Cigarettes    Smokeless tobacco: Never   Substance Use Topics    Alcohol use: Not Currently      Family History:  Family History   Problem Relation Age of Onset    No Known  "Problems Mother     No Known Problems Father               Review of Systems   Constitutional: Negative for malaise/fatigue.   Cardiovascular:  Positive for leg swelling. Negative for chest pain and palpitations.   Respiratory:  Positive for shortness of breath.    Skin:  Negative for rash.   Neurological:  Negative for dizziness, light-headedness and numbness.   All other systems are negative         Objective:     Physical Exam  /55   Pulse 77   Ht 165.1 cm (65\")   Wt 68 kg (150 lb)   LMP 05/01/2023   SpO2 99%   BMI 24.96 kg/m²   General:  Appears in no acute distress  Eyes: Sclera is anicteric,  conjunctiva is clear   HEENT:  No JVD.  No carotid bruits  Respiratory: Respirations regular and unlabored at rest.  Clear to auscultation  Cardiovascular: S1,S2 Regular rate and rhythm. No murmur, rub or gallop auscultated.   Extremities: No digital clubbing or cyanosis, no edema  Skin: Color pink. Skin warm and dry to touch. No rashes  No xanthoma  Neuro: Alert and awake.    Lab Reviewed:         Quang Hernandez MD  6/19/2023 15:09 EDT      EMR Dragon/Transcription:   \"Dictated utilizing Dragon dictation\".        "

## 2023-08-09 ENCOUNTER — HOSPITAL ENCOUNTER (OUTPATIENT)
Facility: HOSPITAL | Age: 28
Discharge: HOME | End: 2023-08-09
Attending: EMERGENCY MEDICINE
Payer: MEDICAID

## 2023-08-09 VITALS
BODY MASS INDEX: 25.66 KG/M2 | RESPIRATION RATE: 18 BRPM | TEMPERATURE: 98.2 F | HEIGHT: 65 IN | WEIGHT: 154 LBS | HEART RATE: 83 BPM | SYSTOLIC BLOOD PRESSURE: 100 MMHG | OXYGEN SATURATION: 99 % | DIASTOLIC BLOOD PRESSURE: 59 MMHG

## 2023-08-09 DIAGNOSIS — O98.911: ICD-10-CM

## 2023-08-09 DIAGNOSIS — U07.1 COVID: Primary | ICD-10-CM

## 2023-08-09 LAB
FLUAV SUBTYP SPEC NAA+PROBE: NOT DETECTED
FLUBV RNA ISLT QL NAA+PROBE: NOT DETECTED
SARS-COV-2 RNA RESP QL NAA+PROBE: NOT DETECTED

## 2023-08-09 PROCEDURE — G0463 HOSPITAL OUTPT CLINIC VISIT: HCPCS

## 2023-08-09 PROCEDURE — 99282 EMERGENCY DEPT VISIT SF MDM: CPT

## 2023-08-09 PROCEDURE — G0463 HOSPITAL OUTPT CLINIC VISIT: HCPCS | Performed by: EMERGENCY MEDICINE

## 2023-08-09 PROCEDURE — 99212 OFFICE O/P EST SF 10 MIN: CPT | Performed by: EMERGENCY MEDICINE

## 2023-08-09 PROCEDURE — 87636 SARSCOV2 & INF A&B AMP PRB: CPT

## 2023-08-09 NOTE — FSED PROVIDER NOTE
Subjective   History of Present Illness  Patient presents 15 weeks pregnant complaining of viral syndrome, concerned it could be COVID or flu, no nausea vomiting diarrhea fever chills at this time, she does have OB follow-up    Review of Systems   Constitutional:  Negative for chills and diaphoresis.   HENT:  Negative for congestion, drooling and ear discharge.    Eyes:  Negative for pain, discharge and itching.   Respiratory:  Negative for apnea, choking and chest tightness.    Cardiovascular:  Negative for chest pain and leg swelling.   Gastrointestinal:  Negative for abdominal distention, abdominal pain and anal bleeding.   Endocrine: Negative for cold intolerance, heat intolerance and polydipsia.   Genitourinary:  Negative for difficulty urinating, dyspareunia and dysuria.   Musculoskeletal:  Negative for arthralgias, back pain and gait problem.   Skin:  Negative for color change, pallor and rash.   Allergic/Immunologic: Negative for environmental allergies and food allergies.   Neurological:  Negative for dizziness, facial asymmetry and headaches.   Hematological:  Negative for adenopathy. Does not bruise/bleed easily.   Psychiatric/Behavioral:  Negative for agitation, behavioral problems, confusion and decreased concentration.    All other systems reviewed and are negative.    Past Medical History:   Diagnosis Date    ADHD     Anxiety     Asymptomatic bacteriuria     Bipolar 1 disorder     Candida esophagitis     Depression     Endocarditis     Hepatitis C     Hypoalbuminemia     Hypomagnesemia     IV drug user     Mycotic aneurysm     Normocytic anemia     secondary to chronic inflammation    Thrombocytopenia     at [facility] - resolved       Allergies   Allergen Reactions    Amoxicillin Rash       Past Surgical History:   Procedure Laterality Date    PACEMAKER IMPLANTATION         Family History   Problem Relation Age of Onset    No Known Problems Mother     No Known Problems Father        Social History      Socioeconomic History    Marital status: Single   Tobacco Use    Smoking status: Every Day     Packs/day: 1.00     Years: 13.00     Pack years: 13.00     Types: Cigarettes    Smokeless tobacco: Never   Vaping Use    Vaping Use: Never used   Substance and Sexual Activity    Alcohol use: Not Currently    Drug use: Yes     Types: IV, Marijuana, Methamphetamines    Sexual activity: Defer           Objective   Physical Exam  Vitals and nursing note reviewed.   Constitutional:       General: She is not in acute distress.     Appearance: Normal appearance.   HENT:      Head: Normocephalic and atraumatic.      Right Ear: Tympanic membrane normal.      Left Ear: Tympanic membrane normal.      Nose: Nose normal.      Mouth/Throat:      Mouth: Mucous membranes are moist.   Eyes:      Extraocular Movements: Extraocular movements intact.      Pupils: Pupils are equal, round, and reactive to light.   Cardiovascular:      Rate and Rhythm: Normal rate and regular rhythm.   Pulmonary:      Effort: Pulmonary effort is normal.      Breath sounds: Normal breath sounds.   Abdominal:      General: Abdomen is flat.      Palpations: Abdomen is soft.   Musculoskeletal:      Cervical back: Normal range of motion and neck supple.   Skin:     General: Skin is warm and dry.   Neurological:      General: No focal deficit present.      Mental Status: She is alert and oriented to person, place, and time.   Psychiatric:         Mood and Affect: Mood normal.         Behavior: Behavior normal.       Procedures           ED Course                                           Medical Decision Making  Patient 15 weeks pregnant, likely viral syndrome,  There is a high level of influenza, COVID and strep activity in the community currently so patient may likely have been exposed to any of these.  Because of this and the symptoms will check labs and treat symptomatically and encourage patient to use motrin/tylenol and drink plenty of fluids and follow-up  with their primary care physician.       Problems Addressed:  COVID: acute illness or injury  Infectious disease in mother during first trimester of pregnancy: acute illness or injury        Final diagnoses:   COVID   Infectious disease in mother during first trimester of pregnancy       ED Disposition  ED Disposition       ED Disposition   Discharge    Condition   Stable    Comment   --               Gokul Santos MD  [address] [facility] [place] [address]  [phone]               Medication List      No changes were made to your prescriptions during this visit.

## 2023-09-22 ENCOUNTER — HOSPITAL ENCOUNTER (OUTPATIENT)
Facility: HOSPITAL | Age: 28
Discharge: HOME | End: 2023-09-22
Attending: PEDIATRICS | Admitting: PEDIATRICS

## 2023-09-22 VITALS
RESPIRATION RATE: 18 BRPM | TEMPERATURE: 97.5 F | OXYGEN SATURATION: 100 % | WEIGHT: 151 LBS | DIASTOLIC BLOOD PRESSURE: 51 MMHG | HEIGHT: 65 IN | BODY MASS INDEX: 25.16 KG/M2 | HEART RATE: 73 BPM | SYSTOLIC BLOOD PRESSURE: 97 MMHG

## 2023-09-22 DIAGNOSIS — J06.9 VIRAL UPPER RESPIRATORY ILLNESS: Primary | ICD-10-CM

## 2023-09-22 LAB
FLUAV SUBTYP SPEC NAA+PROBE: NOT DETECTED
FLUBV RNA ISLT QL NAA+PROBE: NOT DETECTED
SARS-COV-2 RNA RESP QL NAA+PROBE: NOT DETECTED
STREP A PCR: NOT DETECTED

## 2023-09-22 PROCEDURE — 87651 STREP A DNA AMP PROBE: CPT | Performed by: PEDIATRICS

## 2023-09-22 PROCEDURE — G0463 HOSPITAL OUTPT CLINIC VISIT: HCPCS | Performed by: NURSE PRACTITIONER

## 2023-09-22 PROCEDURE — 87636 SARSCOV2 & INF A&B AMP PRB: CPT | Performed by: PEDIATRICS

## 2023-09-22 NOTE — DISCHARGE INSTRUCTIONS
Return for any new or worsening symptoms.    Follow-up with primary care for further evaluation and treatment    Tylenol/Motrin as needed for pain/fever    Make sure you are drinking plenty of fluids

## 2023-09-22 NOTE — FSED PROVIDER NOTE
Subjective   History of Present Illness  The patient is a 28-year-old female who presents to the ER with cough, congestion, sore throat that started 2 days ago.  Patient denies any fevers, nausea, vomiting.    History provided by:  Patient   used: No      Review of Systems   HENT:  Positive for congestion and sore throat.    Respiratory:  Positive for cough.      Past Medical History:   Diagnosis Date    ADHD     Anxiety     Asymptomatic bacteriuria     Bipolar 1 disorder     Candida esophagitis     Depression     Endocarditis     Hepatitis C     Hypoalbuminemia     Hypomagnesemia     IV drug user     Mycotic aneurysm     Normocytic anemia     secondary to chronic inflammation    Thrombocytopenia     at [facility] - resolved       Allergies   Allergen Reactions    Amoxicillin Rash       Past Surgical History:   Procedure Laterality Date    PACEMAKER IMPLANTATION         Family History   Problem Relation Age of Onset    No Known Problems Mother     No Known Problems Father        Social History     Socioeconomic History    Marital status: Single   Tobacco Use    Smoking status: Every Day     Packs/day: 1.00     Years: 13.00     Pack years: 13.00     Types: Cigarettes    Smokeless tobacco: Never   Vaping Use    Vaping Use: Never used   Substance and Sexual Activity    Alcohol use: Not Currently    Drug use: Yes     Types: IV, Marijuana, Methamphetamines    Sexual activity: Defer           Objective   Physical Exam  Vitals and nursing note reviewed.   Constitutional:       Appearance: Normal appearance.   HENT:      Head: Normocephalic.      Right Ear: Tympanic membrane, ear canal and external ear normal.      Left Ear: Tympanic membrane, ear canal and external ear normal.      Nose: Nose normal.      Mouth/Throat:      Mouth: Mucous membranes are moist.   Eyes:      Extraocular Movements: Extraocular movements intact.      Conjunctiva/sclera: Conjunctivae normal.      Pupils: Pupils are equal, round, and  reactive to light.   Cardiovascular:      Rate and Rhythm: Normal rate and regular rhythm.      Pulses: Normal pulses.      Heart sounds: Normal heart sounds.   Pulmonary:      Effort: Pulmonary effort is normal.      Breath sounds: Normal breath sounds.   Abdominal:      General: Bowel sounds are normal.      Palpations: Abdomen is soft.   Musculoskeletal:         General: Normal range of motion.      Cervical back: Normal range of motion and neck supple.   Skin:     General: Skin is warm and dry.   Neurological:      General: No focal deficit present.      Mental Status: She is alert and oriented to person, place, and time.   Psychiatric:         Mood and Affect: Mood normal.         Behavior: Behavior normal. Behavior is cooperative.       Procedures           ED Course  ED Course as of 09/22/23 0911   Fri Sep 22, 2023   0904 STREP A PCR: Not Detected [DS]   0904 COVID19: Not Detected [DS]   0904 Influenza A PCR: Not Detected [DS]   0904 Influenza B PCR: Not Detected [DS]      ED Course User Index  [DS] Maria Luisa Mahmood APRN                                           Medical Decision Making  The patient is a 28-year-old female who presents to the ER with cough, congestion, sore throat that started 2 days ago.  Patient denies any fevers, nausea, vomiting.    This patient presents with symptoms suspicious for likely viral upper respiratory infection. Based on history and physical doubt sinusitis. COVID/Flu is negative at this time. Do not suspect underlying cardiopulmonary process. I considered, but think unlikely, dangerous causes of this patient's symptoms to include ACS, CHF or COPD exacerbations, pneumonia, pneumothorax. Patient is nontoxic appearing and not in need of emergent medical intervention.    Patient advised to follow up with primary care.     Amount and/or Complexity of Data Reviewed  Labs:  Decision-making details documented in ED Course.    Risk  OTC drugs.        Final diagnoses:   Viral upper  respiratory illness       ED Disposition  ED Disposition       ED Disposition   Discharge    Condition   Stable    Comment   --               Gokul Santos MD  [address] [facility] [place] [id number] [phone]    Schedule an appointment as soon as possible for a visit in 1 week  As needed, If symptoms worsen         Medication List      No changes were made to your prescriptions during this visit.

## 2023-10-16 NOTE — PROGRESS NOTES
Enter Query Response Below      Query Response: Clinical Dx of Covid even with Negative test result             If applicable, please update the problem list.   Patient: Ani Fields        : 1995  Account: [id number]           Admit Date: 2023    Please update your ED Provider Note with the answer to the following query:        Dr. Shannon Date: 2023     Will you please addend your provider note to include any and all known final diagnosis or symptoms?    Please clarify/confirm the final dx of Covid 19, as the Covid 19 test performed in the ED has resulted as negative.      Thank you for your help.    If you have questions about this query, please contact me at [phone]    Sincerely,  [facility] Coding Department

## 2023-11-14 ENCOUNTER — TELEPHONE (OUTPATIENT)
Dept: CARDIOLOGY | Facility: CLINIC | Age: 28
End: 2023-11-14
Payer: MEDICAID

## 2023-11-14 NOTE — TELEPHONE ENCOUNTER
Hub staff attempted to follow warm transfer process and was unsuccessful     Caller: Ani Fields    Relationship to patient: Self    Best call back number: [phone]     Patient is needing: PT CALLED BACK TO [facility] F/U APPT-JAME REACHED OUT TO HER-UNABLE TO WT-PLEASE CALL PT TO [facility]-IF UNABLE TO REACH HER AT NUMBER ABOVE PLEASE CALL [phone]

## 2023-11-15 ENCOUNTER — HOSPITAL ENCOUNTER (OUTPATIENT)
Facility: HOSPITAL | Age: 28
Discharge: HOME | End: 2023-11-15
Attending: EMERGENCY MEDICINE | Admitting: EMERGENCY MEDICINE
Payer: MEDICAID

## 2023-11-15 VITALS
BODY MASS INDEX: 26.66 KG/M2 | WEIGHT: 160 LBS | TEMPERATURE: 97.6 F | HEART RATE: 84 BPM | RESPIRATION RATE: 18 BRPM | HEIGHT: 65 IN | DIASTOLIC BLOOD PRESSURE: 48 MMHG | SYSTOLIC BLOOD PRESSURE: 91 MMHG | OXYGEN SATURATION: 95 %

## 2023-11-15 DIAGNOSIS — F17.200 SMOKER: ICD-10-CM

## 2023-11-15 DIAGNOSIS — J22 LOWER RESPIRATORY INFECTION (E.G., BRONCHITIS, PNEUMONIA, PNEUMONITIS, PULMONITIS): Primary | ICD-10-CM

## 2023-11-15 DIAGNOSIS — Z3A.29 29 WEEKS GESTATION OF PREGNANCY: ICD-10-CM

## 2023-11-15 LAB
BILIRUB UR QL STRIP: NEGATIVE
CLARITY UR: ABNORMAL
COLOR UR: ABNORMAL
FLUAV SUBTYP SPEC NAA+PROBE: NOT DETECTED
FLUBV RNA ISLT QL NAA+PROBE: NOT DETECTED
GLUCOSE UR STRIP-MCNC: NEGATIVE MG/DL
HGB UR QL STRIP.AUTO: NEGATIVE
KETONES UR QL STRIP: NEGATIVE
LEUKOCYTE ESTERASE UR QL STRIP.AUTO: NEGATIVE
NITRITE UR QL STRIP: NEGATIVE
PH UR STRIP.AUTO: 7 [PH] (ref 5–8)
PROT UR QL STRIP: ABNORMAL
SARS-COV-2 RNA RESP QL NAA+PROBE: NOT DETECTED
SP GR UR STRIP: 1.02 (ref 1–1.03)
UROBILINOGEN UR QL STRIP: ABNORMAL

## 2023-11-15 PROCEDURE — 87186 SC STD MICRODIL/AGAR DIL: CPT

## 2023-11-15 PROCEDURE — G0463 HOSPITAL OUTPT CLINIC VISIT: HCPCS

## 2023-11-15 PROCEDURE — 87086 URINE CULTURE/COLONY COUNT: CPT

## 2023-11-15 PROCEDURE — 87636 SARSCOV2 & INF A&B AMP PRB: CPT | Performed by: EMERGENCY MEDICINE

## 2023-11-15 PROCEDURE — 87077 CULTURE AEROBIC IDENTIFY: CPT

## 2023-11-15 PROCEDURE — 81003 URINALYSIS AUTO W/O SCOPE: CPT

## 2023-11-15 RX ORDER — AZITHROMYCIN 250 MG/1
TABLET, FILM COATED ORAL
Qty: 6 TABLET | Refills: 0 | OUTPATIENT
Start: 2023-11-15 | End: 2023-11-21

## 2023-11-15 NOTE — DISCHARGE INSTRUCTIONS
Thank you for letting us care for you today.  I encouraged her to stop smoking.  Take the prescribed antibiotic medicine you are given as directed until it is gone. Take it even if you feel better. It treats the infection and stops it from returning. Not taking all the medicine can make future infections hard to treat.  Please follow-up with your primary care provider and OB/GYN.  Return to emergency room for any abdominal pain, vaginal bleeding, vaginal discharge, chest pain, shortness of breath or any other concerning symptoms.    THIS LIST WILL HELP YOU TO KNOW SOME OVER-THE-COUNTER MEDICATIONS YOU CAN TAKE DURING PREGNANCY    (Lista de medicinas que puede beatrice ankit el embarazo)    COLD (Cararro):         Robitussin         Actifed         Tylenol Cold      PAINS AND ACHES (Sarahi):  Tylenol   Tylenol extra strength       DIARRHEA (diarrea):      Fibercon        Clear liquids or jello     HEADACHES (dolor de montse):  Tylenol extra strength    HEARTBURN/INDIGESTION:     Maalox, Mylanta, Tums, Zantac    SWELLING (Inchazon):     No salt  (inchazon) no fast food or junk food  No sodas  No canned soups    TO SLEEP (Para dormir):      Tylenol PM        Benadryl 25 mg    YEAST INFECTION:   Monistat 7 or Monistat 3 (generic ok)     CONSTIPATION:  Metamucil  Eat very little rice (coma poco arroz)  Add in Prune or Prune juice (Ciruelas, jugo de cireuelas)  8 glasses of water daily (8 vasos de agua al zena)  Colace (stool softners) generic ok    HEMORRHOIDS (Hemorroides):     Preparation H, Anusol     SEASONAL ALLERGIES:  Benadryl, Zyrtec, Claritin

## 2023-11-15 NOTE — Clinical Note
[facility] FSED [place] [address] [place] [address] [place] [address]  Phone: [phone]    Ani Fields was seen and treated in our emergency department on 11/15/2023.  She may return to work on 11/16/2023.         Thank you for choosing [facility].    Ross Lerma MD

## 2023-11-15 NOTE — FSED PROVIDER NOTE
[facility]    EMERGENCY DEPARTMENT ENCOUNTER    Room Number:  11/11  Date seen:  11/15/2023  Time seen: 17:00 EST  PCP: Gokul Santos MD  Historian: Patient    HPI:  Chief complaint: Cough  Context:Ani Fields is a 28 y.o. female who presents to the ED with c/o cough.  Patient reports that she has been having a cough with intermittent vomiting.  She reports that she is approximately 29 weeks pregnant.  She reports that she smokes daily.  Patient denies any vaginal bleeding, dysuria, urinary symptoms, vaginal discharge.  Patient reports that her cough is dry in nature.  She also reports that she has been having some congestion.  She denies any sore throat or ear pain.  The patient is nontoxic in appearance.    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    Bacterial endocarditis 08/22/2022    Disorder involving thrombocytopenia 06/24/2022    Endocarditis 07/02/2022    S/P TVR (tricuspid valve replacement) 08/22/2022     Resolved Ambulatory Problems     Diagnosis Date Noted    No Resolved Ambulatory Problems     Past Medical History:   Diagnosis Date    ADHD     Anxiety     Asymptomatic bacteriuria     Bipolar 1 disorder     Candida esophagitis     Depression     Hepatitis C     Hypoalbuminemia     Hypomagnesemia     IV drug user     Mycotic aneurysm     Normocytic anemia     Thrombocytopenia        PAST SURGICAL HISTORY  Past Surgical History:   Procedure Laterality Date     PACEMAKER IMPLANTATION         FAMILY HISTORY  Family History   Problem Relation Age of Onset    No Known Problems Mother     No Known Problems Father        SOCIAL HISTORY  Social History     Socioeconomic History    Marital status: Single   Tobacco Use    Smoking status: Every Day     Packs/day: 1.00     Years: 13.00     Additional pack years: 0.00     Total pack years: 13.00     Types: Cigarettes    Smokeless tobacco: Never   Vaping Use    Vaping Use: Never used   Substance and Sexual Activity    Alcohol use: Not Currently    Drug use: Yes     Types: IV, Marijuana, Methamphetamines    Sexual activity: Defer       REVIEW OF SYSTEMS  Review of Systems    All systems reviewed and negative except for those discussed in HPI.     PHYSICAL EXAM    I have reviewed the triage vital signs and nursing notes.    ED Triage Vitals   Temp Heart Rate Resp BP SpO2   11/15/23 1604 11/15/23 1548 11/15/23 1604 11/15/23 1604 11/15/23 1548   97.6 °F (36.4 °C) 99 18 91/48 97 %      Temp src Heart Rate Source Patient Position BP Location FiO2 (%)   -- 11/15/23 1548 -- -- --    Monitor          Physical Exam  Constitutional:       Appearance: Normal appearance. She is not toxic-appearing.   HENT:      Right Ear: Tympanic membrane and ear canal normal.      Left Ear: Tympanic membrane and ear canal normal.      Nose: Nose normal.      Mouth/Throat:      Mouth: Mucous membranes are moist.      Pharynx: Oropharynx is clear. No oropharyngeal exudate or posterior oropharyngeal erythema.   Eyes:      Extraocular Movements: Extraocular movements intact.      Conjunctiva/sclera: Conjunctivae normal.      Pupils: Pupils are equal, round, and reactive to light.   Cardiovascular:      Rate and Rhythm: Normal rate and regular rhythm.      Pulses: Normal pulses.      Heart sounds: Normal heart sounds.   Pulmonary:      Effort: Pulmonary effort is normal.      Breath sounds: Normal breath sounds.   Skin:     General: Skin is warm.   Neurological:       General: No focal deficit present.      Mental Status: She is alert.   Psychiatric:         Mood and Affect: Mood normal.         Behavior: Behavior normal.         Vital signs and nursing notes reviewed.        LAB RESULTS  Recent Results (from the past 24 hour(s))   COVID-19 and FLU A/B PCR, 1 HR TAT - Swab, Nasopharynx    Collection Time: 11/15/23  4:10 PM    Specimen: Nasopharynx; Swab   Result Value Ref Range    COVID19 Not Detected Not Detected - Ref. Range    Influenza A PCR Not Detected Not Detected    Influenza B PCR Not Detected Not Detected   Urinalysis With Culture If Indicated - Urine, Clean Catch    Collection Time: 11/15/23  5:14 PM    Specimen: Urine, Clean Catch   Result Value Ref Range    Color, UA Green (A) Yellow, Straw    Appearance, UA Slightly Cloudy (A) Clear    pH, UA 7.0 5.0 - 8.0    Specific Gravity, UA 1.020 1.005 - 1.030    Glucose, UA Negative Negative    Ketones, UA Negative Negative    Bilirubin, UA Negative Negative    Blood, UA Negative Negative    Protein, UA Trace (A) Negative    Leuk Esterase, UA Negative Negative    Nitrite, UA Negative Negative    Urobilinogen, UA 1.0 E.U./dL 0.2 - 1.0 E.U./dL       Ordered the above labs and independently reviewed the results.      RADIOLOGY RESULTS  No Radiology Exams Resulted Within Past 24 Hours       I ordered the above noted radiological studies. Independently reviewed by me and discussed with radiologist.  See dictation above for official radiology interpretation.      Orders placed during this visit:  Orders Placed This Encounter   Procedures    COVID-19 and FLU A/B PCR, 1 HR TAT - Swab, Nasopharynx    Urine Culture - Urine,    Urinalysis With Culture If Indicated - Urine, Clean Catch           PROCEDURES    Procedures        MEDICATIONS GIVEN IN ER    Medications - No data to display      PROGRESS, DATA ANALYSIS, CONSULTS, AND MEDICAL DECISION MAKING    All labs have been independently reviewed by me.  All radiology studies have  been reviewed by me.   EKG's independently reviewed by me.  Discussion below represents my analysis of pertinent findings related to patient's condition, differential diagnosis, treatment plan and final disposition.    I rechecked the patient.  I discussed the patient's labs, radiology findings (including all incidental findings), diagnosis, and plan for discharge.  A repeat exam reveals no new worrisome changes from my initial exam findings.  The patient understands that the fact that they are being discharged does not denote that nothing is abnormal, it indicates that no clinical emergency is present and that they must follow-up as directed in order to properly maintain their health.  Follow-up instructions (specifically listed below) and return to ER precautions were given at this time.  I specifically instructed the patient to follow-up with their PCP.  The patient understands and agrees with the plan, and is ready for discharge.  All questions answered.    ED Course as of 11/15/23 1734   Wed Nov 15, 2023   1632 COVID19: Not Detected [KJ]   1632 Influenza A PCR: Not Detected [KJ]   1632 Influenza B PCR: Not Detected [KJ]      ED Course User Index  [KJ] Zakia Clark, ALTON       AS OF 17:34 EST VITALS:    BP - 91/48  HR - 84  TEMP - 97.6 °F (36.4 °C)  02 SATS - 95%    Medical Decision Making  MEDICAL DECISION  This patient presents with acute cough, most consistent with lower respiratory infection. Presentation not consistent with acute bacterial pneumonia, influenza, asthma, transient airway hyperresponsiveness. Presentation not consistent with chronic causes of cough (including GERD, asthma, postnasal discharge, medication side effect, CHF, lung cancer or mass).  Patient is currently 29 weeks pregnant but denies any abdominal pain, vaginal bleeding, vaginal discharge or urinary symptoms.  Patient is an every day smoker.  She reports that she has been having a nonproductive cough.  I will go ahead and treat  her with azithromycin and have her follow-up with her primary care provider and OB/GYN for continued evaluation.    Problems Addressed:  29 weeks gestation of pregnancy: complicated acute illness or injury  Lower respiratory infection (e.g., bronchitis, pneumonia, pneumonitis, pulmonitis): complicated acute illness or injury  Smoker: complicated acute illness or injury    Amount and/or Complexity of Data Reviewed  Labs:  Decision-making details documented in ED Course.    Risk  Prescription drug management.          DIAGNOSIS  Final diagnoses:   Lower respiratory infection (e.g., bronchitis, pneumonia, pneumonitis, pulmonitis)   29 weeks gestation of pregnancy   Smoker       New Medications Ordered This Visit   Medications    azithromycin (Zithromax Z-Ariel) 250 MG tablet     Sig: Take 2 tablets by mouth on day 1, then 1 tablet daily on days 2-5     Dispense:  6 tablet     Refill:  0           I performed hand hygiene on entry into the pt room and upon exit.     Note Disclaimer: At [facility], we believe that sharing information builds trust and better relationships. You are receiving this note because you recently visited [facility]. It is possible you will see health information before a provider has talked with you about it. This kind of information can be easy to misunderstand. To help you fully understand what it means for your health, we urge you to discuss this note with your provider.         Part of this note may be an electronic transcription/translation of spoken language to printed text using the Dragon Dictation System.     Appropriate PPE worn during exam.    Dictated utilizing Dragon dictation     Note Disclaimer: At [facility], we believe that sharing information builds trust and better relationships. You are receiving this note because you recently visited [facility]. It is possible you will see health information before a provider has talked with you about it. This kind of information  can be easy to misunderstand. To help you fully understand what it means for your health, we urge you to discuss this note with your provider.

## 2023-11-17 LAB — BACTERIA SPEC AEROBE CULT: ABNORMAL

## 2023-11-17 RX ORDER — CEPHALEXIN 500 MG/1
500 CAPSULE ORAL 3 TIMES DAILY
Qty: 21 CAPSULE | Refills: 0 | OUTPATIENT
Start: 2023-11-17 | End: 2023-11-21

## 2023-11-21 ENCOUNTER — HOSPITAL ENCOUNTER (OUTPATIENT)
Facility: HOSPITAL | Age: 28
Discharge: HOME | End: 2023-11-21
Attending: EMERGENCY MEDICINE | Admitting: EMERGENCY MEDICINE
Payer: MEDICAID

## 2023-11-21 VITALS
BODY MASS INDEX: 27.44 KG/M2 | DIASTOLIC BLOOD PRESSURE: 66 MMHG | OXYGEN SATURATION: 99 % | WEIGHT: 164.7 LBS | HEIGHT: 65 IN | HEART RATE: 79 BPM | SYSTOLIC BLOOD PRESSURE: 110 MMHG | TEMPERATURE: 98.7 F | RESPIRATION RATE: 20 BRPM

## 2023-11-21 DIAGNOSIS — Z3A.29 PREGNANCY WITH 29 COMPLETED WEEKS GESTATION: ICD-10-CM

## 2023-11-21 DIAGNOSIS — T78.40XA ALLERGIC REACTION TO DRUG, INITIAL ENCOUNTER: Primary | ICD-10-CM

## 2023-11-21 LAB
BACTERIA UR QL AUTO: ABNORMAL /HPF
BILIRUB UR QL STRIP: NEGATIVE
CLARITY UR: ABNORMAL
COLOR UR: YELLOW
GLUCOSE UR STRIP-MCNC: NEGATIVE MG/DL
HGB UR QL STRIP.AUTO: NEGATIVE
HYALINE CASTS UR QL AUTO: ABNORMAL /LPF
KETONES UR QL STRIP: NEGATIVE
LEUKOCYTE ESTERASE UR QL STRIP.AUTO: ABNORMAL
NITRITE UR QL STRIP: NEGATIVE
PH UR STRIP.AUTO: 8.5 [PH] (ref 5–8)
PROT UR QL STRIP: NEGATIVE
RBC # UR STRIP: ABNORMAL /HPF
REF LAB TEST METHOD: ABNORMAL
SP GR UR STRIP: 1.02 (ref 1–1.03)
SQUAMOUS #/AREA URNS HPF: ABNORMAL /HPF
UROBILINOGEN UR QL STRIP: ABNORMAL
WBC # UR STRIP: ABNORMAL /HPF

## 2023-11-21 PROCEDURE — 87086 URINE CULTURE/COLONY COUNT: CPT | Performed by: NURSE PRACTITIONER

## 2023-11-21 PROCEDURE — 81001 URINALYSIS AUTO W/SCOPE: CPT | Performed by: NURSE PRACTITIONER

## 2023-11-21 PROCEDURE — G0463 HOSPITAL OUTPT CLINIC VISIT: HCPCS | Performed by: NURSE PRACTITIONER

## 2023-11-21 RX ORDER — NITROFURANTOIN 25; 75 MG/1; MG/1
100 CAPSULE ORAL 2 TIMES DAILY
Qty: 14 CAPSULE | Refills: 0 | Status: SHIPPED | OUTPATIENT
Start: 2023-11-21 | End: 2023-11-28

## 2023-11-21 NOTE — DISCHARGE INSTRUCTIONS
Stop the keflex.  I have listed as allergy    Start Macrobid for the UTI.  It has been sent to pharmacy.    Always wipe front to back  Wear cotton panties  Wear no panties to bed  Urinate after intercourse    Follow up with OB/GYN    Return Precautions    Although you are being discharged from the ED today, I encourage you to return for worsening symptoms.  Things can, and do, change such that treatment at home with medication may not be adequate.      Specifically, return for any of the following:    Chest pain, shortness of breath, pain or nausea and vomiting not controlled by medications provided.    Please make a follow up with your Primary Care Provider for a blood pressure recheck.

## 2023-11-21 NOTE — Clinical Note
[facility] [place] [id number] [place] [address] [place] [address]  Phone: [phone]    partner of Ani Fields accompanied Ani Fields to the emergency department on 11/21/2023. They may return to work on 11/22/2023.        Thank you for choosing [facility].    Irma Wick APRN

## 2023-11-21 NOTE — FSED PROVIDER NOTE
EMERGENCY DEPARTMENT ENCOUNTER    Room Number:    Date seen:  2023  Time seen: 15:49 EST  PCP: Gokul Santos MD  Historian: Patient    Discussed/obtained information from independent historians: Not applicable    HPI:  Chief complaint: Allergic reaction  A complete HPI/ROS/PMH/PSH/SH/FH are unobtainable due to: Not applicable  Context:Ani Fields is a 28 y.o. female  currently 29 weeks pregnant patient of Dr. Mendoza who presents to the ED with c/o concern for allergic reaction due to waking up this morning with upper facial swelling and taking 1 dose of Keflex last night which we prescribed for a UTI.  He denies any shortness of breath or chest tightness she was seen here on November 15 for a cough.  We did send urine culture and called in antibiotics.  She also reports decreased fetal movement this morning and when she arrived here she said the baby had not moved all morning but that the baby is started to move while she was in the waiting room.    External (non-ED) record review:  I reviewed recent visit here.  I also reviewed patient's recent cardiology office visit from  of this year.  She has a history of IV drug abuse, mitral, tricuspid valve replacements in 2022.  She has history of mycotic aneurysm requiring embolization, middle cerebral artery aneurysm    Chronic or social conditions impacting care: n/a    ALLERGIES  Keflex [cephalexin] and Amoxicillin    PAST MEDICAL HISTORY  Active Ambulatory Problems     Diagnosis Date Noted    Sepsis 2022    Presence of prosthetic heart valve 2022    Sick sinus syndrome 2023    Aneurysm of middle cerebral artery 2022    Bacterial endocarditis 2022    Disorder involving thrombocytopenia 2022    Endocarditis 2022    S/P TVR (tricuspid valve replacement) 2022     Resolved Ambulatory Problems     Diagnosis Date Noted    No Resolved Ambulatory Problems     Past Medical History:    Diagnosis Date    ADHD     Anxiety     Asymptomatic bacteriuria     Bipolar 1 disorder     Candida esophagitis     Depression     Hepatitis C     Hypoalbuminemia     Hypomagnesemia     IV drug user     Mycotic aneurysm     Normocytic anemia     Thrombocytopenia        PAST SURGICAL HISTORY  Past Surgical History:   Procedure Laterality Date    PACEMAKER IMPLANTATION         FAMILY HISTORY  Family History   Problem Relation Age of Onset    No Known Problems Mother     No Known Problems Father        SOCIAL HISTORY  Social History     Socioeconomic History    Marital status: Single   Tobacco Use    Smoking status: Every Day     Packs/day: 1.00     Years: 13.00     Additional pack years: 0.00     Total pack years: 13.00     Types: Cigarettes    Smokeless tobacco: Never   Vaping Use    Vaping Use: Never used   Substance and Sexual Activity    Alcohol use: Not Currently    Drug use: Yes     Types: IV, Marijuana, Methamphetamines    Sexual activity: Defer       REVIEW OF SYSTEMS  Review of Systems    All systems reviewed and negative except for those discussed in HPI.     PHYSICAL EXAM    I have reviewed the triage vital signs and nursing notes.  Vitals:    11/21/23 1414   BP: 110/66   Pulse: 79   Resp: 20   Temp: 98.7 °F (37.1 °C)   SpO2: 99%     Physical Exam    GENERAL: not distressed  HENT: nares patent, mm moist, voice normal. No tonsillar erythema, edema.   EYES: no scleral icterus  NECK: no ROM limitations  CV: regular rhythm, regular rate, no murmur  RESPIRATORY: normal effort, No wheezing or stridor  ABDOMEN: soft, Gravid  : deferred  MUSCULOSKELETAL: no deformity  NEURO: alert, moves all extremities, follows commands  SKIN: warm, dry    LAB RESULTS  Recent Results (from the past 24 hour(s))   Urinalysis With Culture If Indicated - Urine, Clean Catch    Collection Time: 11/21/23  4:03 PM    Specimen: Urine, Clean Catch   Result Value Ref Range    Color, UA Yellow Yellow, Straw    Appearance, UA Cloudy (A)  Clear    pH, UA 8.5 (H) 5.0 - 8.0    Specific Gravity, UA 1.020 1.005 - 1.030    Glucose, UA Negative Negative    Ketones, UA Negative Negative    Bilirubin, UA Negative Negative    Blood, UA Negative Negative    Protein, UA Negative Negative    Leuk Esterase, UA Small (1+) (A) Negative    Nitrite, UA Negative Negative    Urobilinogen, UA 1.0 E.U./dL 0.2 - 1.0 E.U./dL       Ordered the above labs and independently interpreted results.  My findings will be discussed in the ED course or medical decision making section below        PROGRESS, DATA ANALYSIS, CONSULTS AND MEDICAL DECISION MAKING    Please note that this section constitutes my independent interpretation of clinical data including lab results, radiology, EKG's.  This constitutes my independent professional opinion regarding differential diagnosis and management of this patient.  It may include any factors such as history from outside sources, review of external records, social determinants of health, management of medications, response to those treatments, and discussions with other providers.    ED Course as of 11/21/23 1636   Tue Nov 21, 2023   1558 FHT checked by me:  141    Limited bedside ultrasound shows active fetus.  Mom is feeling kicking now.  [EW]      ED Course User Index  [EW] Irma Wick APRN     Orders placed during this visit:  Orders Placed This Encounter   Procedures    Urine Culture - Urine,    Urinalysis With Culture If Indicated - Urine, Clean Catch    Urinalysis, Microscopic Only - Urine, Clean Catch            Medical Decision Making  Fetal status is reassuring.  Fetus is active on limited bedside ultrasound performed by me today.  Fetal heart tones 141 and reassuring.  Her urine does not look infected today.  She has no complaints of urinary symptoms but did have a recent culture that grew back E. coli.  I did research this urine culture and it was sensitive to the Keflex that she was prescribed but she states it made her face  surjit.  Given other alternatives I think Macrobid would be a better choice.  On exam she has no wheezing stridor or throat swelling.  Her face is no longer swollen.      DIAGNOSIS  Final diagnoses:   Allergic reaction to drug, initial encounter   Pregnancy with 29 completed weeks gestation          Medication List        New Prescriptions      nitrofurantoin (macrocrystal-monohydrate) 100 MG capsule  Commonly known as: MACROBID  Take 1 capsule by mouth 2 (Two) Times a Day for 7 days.            Stop      azithromycin 250 MG tablet  Commonly known as: Zithromax Z-Ariel     cephalexin 500 MG capsule  Commonly known as: KEFLEX               Where to Get Your Medications        These medications were sent to [id number] - [place], [place] - [address] - [phone]  - [phone] FX  [address] [place] [address]      Phone: [phone]   nitrofurantoin (macrocrystal-monohydrate) 100 MG capsule         FOLLOW-UP  Dakota Mendoza MD  [address] [place] [address] [place] [address] [phone]    Schedule an appointment as soon as possible for a visit in 1 day  As needed, If symptoms worsen        Latest Documented Vital Signs:  As of 16:36 EST  BP- 110/66 HR- 79 Temp- 98.7 °F (37.1 °C) (Temporal) O2 sat- 99%    Appropriate PPE utilized throughout this patient encounter to include mask, if indicated, per current protocol. Hand hygiene was performed before donning PPE and after removal when leaving the room.    Please note that portions of this were completed with a voice recognition program.     Note Disclaimer: At [facility], we believe that sharing information builds trust and better relationships. You are receiving this note because you are receiving care at [facility] or recently visited. It is possible you will see health information before a provider has talked with you about it. This kind of information can be easy to misunderstand. To help you fully understand what it means  for your health, we urge you to discuss this note with your provider.

## 2023-11-21 NOTE — Clinical Note
[facility] [place] [address] [place] [address] [place] [address]  Phone: [phone]    Ani Fields was seen and treated in our emergency department on 11/21/2023.  She may return to work on 11/22/2023.         Thank you for choosing [facility].    Irma Wick APRN

## 2023-11-22 ENCOUNTER — TELEPHONE (OUTPATIENT)
Dept: CARDIOLOGY | Facility: CLINIC | Age: 28
End: 2023-11-22

## 2023-11-22 ENCOUNTER — OFFICE VISIT (OUTPATIENT)
Dept: CARDIOLOGY | Facility: CLINIC | Age: 28
End: 2023-11-22
Payer: MEDICAID

## 2023-11-22 ENCOUNTER — CLINICAL SUPPORT NO REQUIREMENTS (OUTPATIENT)
Dept: CARDIOLOGY | Facility: CLINIC | Age: 28
End: 2023-11-22
Payer: MEDICAID

## 2023-11-22 VITALS
HEIGHT: 65 IN | DIASTOLIC BLOOD PRESSURE: 66 MMHG | SYSTOLIC BLOOD PRESSURE: 102 MMHG | OXYGEN SATURATION: 97 % | WEIGHT: 164 LBS | BODY MASS INDEX: 27.32 KG/M2 | HEART RATE: 78 BPM

## 2023-11-22 DIAGNOSIS — I49.5 SICK SINUS SYNDROME: ICD-10-CM

## 2023-11-22 DIAGNOSIS — Z95.0 PACEMAKER: Primary | ICD-10-CM

## 2023-11-22 DIAGNOSIS — Z95.4 S/P TVR (TRICUSPID VALVE REPLACEMENT): ICD-10-CM

## 2023-11-22 DIAGNOSIS — Z95.0 PACEMAKER: ICD-10-CM

## 2023-11-22 DIAGNOSIS — Z95.2 S/P MVR (MITRAL VALVE REPLACEMENT): Primary | ICD-10-CM

## 2023-11-22 LAB — BACTERIA SPEC AEROBE CULT: NO GROWTH

## 2023-11-22 NOTE — TELEPHONE ENCOUNTER
Patient requested [facility] monitor for bed side. Currently be followed by a cardiology group in [place]. Requested release.

## 2023-11-22 NOTE — PROGRESS NOTES
Subjective:     Encounter Date:11/22/2023      Patient ID: Ani Feilds is a 28 y.o. female.    Chief Complaint and history of present illness:       Follow-up for permanent pacemaker, endocarditis, valve replacement     History of Present Illness:         Ms. Ani Fields has past medical history of     CHB/permanent pacemaker 8/22/2022, abdominal implant, [facility]  ADHD, anxiety, depression, bipolar  Hep C  History of IVDA, polysubstance abuse heroine cocaine marijuana  Endocarditis 8/22/2022  Mitral, tricuspid bioprosthetic valve replacement 8/22/22 at [facility]  History of mycotic aneurysm requiring embolization  Bacteremia with MSSA  Middle cerebral artery aneurysm 9/8/2022.  Mycotic aneurysm of left MCA  Anemia  Allergy/intolerance to amoxicillin  Every day smoker     Here for follow-up..  Patient denies any chest pain.  He is   pregnant, due in 10 weeks.  Patient I reportedly stopped drugs.  Continues to smoke.        Patient was in [facility] with MSSA endocarditis due to IVDA underwent valve replacement mitral and tricuspid bioprosthetic valve replacement and permanent pacemaker implantation.  Patient was supposed to have antibiotics till 9/21/2022 and left AMA 9/14/2022.  Patient states that she completed her antibiotic course here in [place] recently.         Patient's arterial blood pressure is 102/66, heart rate 78, O2 sat of 97% on room air.     Review of records from [facility] reveal from 5/12/2023 chest x-ray is negative.  Blood cultures x2 are negative.  ESR is not normal less than 1.  proBNP is normal at 409.  CMP with a glucose of 105.  CBC with a hemoglobin of 10.6.        Assessment:  :     Permanent pacemaker  History of endocarditis  Status post MVR and TVR  Drug abuse and smoker        Recommendations / Plan:         Reviewed EKG results with patient.  Counseled on smoking cessation and drug abuse cessation.  Counseled on smoking cessation  especially since she is pregnant.  Reviewed echo results with patient.  We will follow-up in pacemaker clinic.     Advised patient to follow-up with primary care physician to coordinate care.            Procedures:  Echocardiogram 6/19/2023 revealed normal LV systolic function with prosthetic valves and mitral and tricuspid position which are working well.           ECG 12 Lead    Date/Time: 11/22/2023 11:41 AM  Performed by: Quang Hernandez MD    Authorized by: Quang Hernandez MD  Comparison: compared with previous ECG from 5/12/2023  Comparison to previous ECG: EKG done today reviewed/interpreted by me reveals sinus rhythm at the rate of 69 bpm with incomplete right bundle branch block, no new change compared EKG from 5/12/2023          Copied text in this portion of the note has been reviewed and is accurate as of 11/22/2023  The following portions of the patient's history were reviewed and updated as appropriate: allergies, current medications, past family history, past medical history, past social history, past surgical history and problem list.    Assessment:         MDM       Diagnosis Plan   1. S/P MVR (mitral valve replacement)  ECG 12 Lead      2. S/P TVR (tricuspid valve replacement)        3. Pacemaker  ECG 12 Lead             Plan:               Past Medical History:  Past Medical History:   Diagnosis Date    ADHD     Anxiety     Asymptomatic bacteriuria     Bipolar 1 disorder     Candida esophagitis     Depression     Endocarditis     Hepatitis C     Hypoalbuminemia     Hypomagnesemia     IV drug user     Mycotic aneurysm     Normocytic anemia     secondary to chronic inflammation    Thrombocytopenia     at [facility] - resolved     Past Surgical History:  Past Surgical History:   Procedure Laterality Date    PACEMAKER IMPLANTATION        Allergies:  Allergies   Allergen Reactions    Keflex [Cephalexin] Other (See Comments)     Facial swelling    Amoxicillin Rash     Home Meds:  Current  "Meds:     Current Outpatient Medications:     Buprenorphine HCl-Naloxone HCl (SUBOXONE SL), Place 8 mg under the tongue Daily., Disp: , Rfl:     nitrofurantoin, macrocrystal-monohydrate, (MACROBID) 100 MG capsule, Take 1 capsule by mouth 2 (Two) Times a Day for 7 days., Disp: 14 capsule, Rfl: 0    Prenatal Vit-Fe Fumarate-FA (PRENATAL VITAMIN PO), Take  by mouth., Disp: , Rfl:   Social History:   Social History     Tobacco Use    Smoking status: Every Day     Packs/day: 1.00     Years: 13.00     Additional pack years: 0.00     Total pack years: 13.00     Types: Cigarettes    Smokeless tobacco: Never   Substance Use Topics    Alcohol use: Not Currently      Family History:  Family History   Problem Relation Age of Onset    No Known Problems Mother     No Known Problems Father               ROS  All other systems are negative         Objective:     Physical Exam  /66   Pulse 78   Ht 165.1 cm (65\")   Wt 74.4 kg (164 lb)   LMP 05/01/2023 (Exact Date)   SpO2 97%   BMI 27.29 kg/m²   General:  Appears in no acute distress  Eyes: Sclera is anicteric,  conjunctiva is clear   HEENT:  No JVD.  No carotid bruits  Respiratory: Respirations regular and unlabored at rest.  Clear to auscultation  Cardiovascular: S1,S2 Regular rate and rhythm. .   Extremities: No digital clubbing or cyanosis, no edema  Skin: Color pink. Skin warm and dry to touch. No rashes  No xanthoma  Neuro: Alert and awake.    Lab Reviewed:         Quang Hernandez MD  11/22/2023 11:45 EST      EMR Dragon/Transcription:   \"Dictated utilizing Dragon dictation\".        "

## 2023-12-07 NOTE — TELEPHONE ENCOUNTER
Called [facility] in [place] [phone] to release patient in [facility]. Patient was implanted on 8/16/22 with Dr Negrete. Transferred x 3. Given number [phone].

## 2023-12-14 NOTE — TELEPHONE ENCOUNTER
Spoke to [facility], patient is not enrolled in their clinic. Called [facility], released patient and I am able to pick her up.    Spoke to patient, does not have a transmitter, would like one ordered. No longer at [address], will call back with current address so monitor can be ordered.

## 2024-01-04 LAB — EXTERNAL GROUP B STREP ANTIGEN: POSITIVE

## 2024-01-05 ENCOUNTER — HOSPITAL ENCOUNTER (OUTPATIENT)
Facility: HOSPITAL | Age: 29
Discharge: HOME | End: 2024-01-05
Attending: OBSTETRICS & GYNECOLOGY | Admitting: OBSTETRICS & GYNECOLOGY
Payer: MEDICAID

## 2024-01-05 VITALS
WEIGHT: 162.92 LBS | HEIGHT: 65 IN | HEART RATE: 73 BPM | OXYGEN SATURATION: 99 % | BODY MASS INDEX: 27.14 KG/M2 | DIASTOLIC BLOOD PRESSURE: 58 MMHG | RESPIRATION RATE: 18 BRPM | SYSTOLIC BLOOD PRESSURE: 112 MMHG | TEMPERATURE: 98.2 F

## 2024-01-05 PROCEDURE — G0463 HOSPITAL OUTPT CLINIC VISIT: HCPCS

## 2024-01-17 ENCOUNTER — HOSPITAL ENCOUNTER (OUTPATIENT)
Facility: HOSPITAL | Age: 29
Discharge: HOME | End: 2024-01-17
Attending: EMERGENCY MEDICINE | Admitting: EMERGENCY MEDICINE
Payer: MEDICAID

## 2024-01-17 VITALS
BODY MASS INDEX: 27.66 KG/M2 | HEART RATE: 78 BPM | TEMPERATURE: 98.3 F | HEIGHT: 65 IN | DIASTOLIC BLOOD PRESSURE: 61 MMHG | RESPIRATION RATE: 20 BRPM | SYSTOLIC BLOOD PRESSURE: 108 MMHG | WEIGHT: 166 LBS | OXYGEN SATURATION: 100 %

## 2024-01-17 DIAGNOSIS — U07.1 COVID-19: Primary | ICD-10-CM

## 2024-01-17 LAB
FLUAV SUBTYP SPEC NAA+PROBE: NOT DETECTED
FLUBV RNA ISLT QL NAA+PROBE: NOT DETECTED
SARS-COV-2 RNA RESP QL NAA+PROBE: DETECTED

## 2024-01-17 PROCEDURE — G0463 HOSPITAL OUTPT CLINIC VISIT: HCPCS

## 2024-01-17 PROCEDURE — 87636 SARSCOV2 & INF A&B AMP PRB: CPT | Performed by: EMERGENCY MEDICINE

## 2024-01-17 NOTE — DISCHARGE INSTRUCTIONS
Thank you for letting us care for you today.  You can use Tylenol as needed for pain and fever.  Drink plenty fluids and get rest.  Follow-up with your primary care provider.  Return for any new or worsening symptoms. You were positive for covid today. Please quarantine at home for 5 days then wear a mask for 5 days from the onset of symptoms.       Wash/sanitize common household surfaces with antibacterial wipes.  Especially door knobs, light switches. Change bed linens and wash bath towels/washcloths. Frequent handwashing. Cough/sneeze into your sleeve. Treat fever every 6-8 hours with adult/children Tylenol (generic acetaminophen)    THIS LIST WILL HELP YOU TO KNOW SOME OVER-THE-COUNTER MEDICATIONS YOU CAN TAKE DURING PREGNANCY  COLD (Cararro):         Robitussin         Actifed         Tylenol Cold      PAINS AND ACHES (Sarahi):  Tylenol   Tylenol extra strength       DIARRHEA (diarrea):      Fibercon        Clear liquids or jello     HEADACHES (dolor de montse):  Tylenol extra strength    HEARTBURN/INDIGESTION:     Maalox, Mylanta, Tums, Zantac    SWELLING (Inchazon):     No salt  (inchazon) no fast food or junk food  No sodas  No canned soups    TO SLEEP (Para dormir):      Tylenol PM        Benadryl 25 mg    YEAST INFECTION:   Monistat 7 or Monistat 3 (generic ok)     CONSTIPATION:  Metamucil  Eat very little rice (coma poco arroz)  Add in Prune or Prune juice (Ciruelas, jugo de cireuelas)  8 glasses of water daily (8 vasos de agua al zena)  Colace (stool softners) generic ok    HEMORRHOIDS (Hemorroides):     Preparation H, Anusol     SEASONAL ALLERGIES:  Benadryl, Zyrtec, Claritin

## 2024-01-17 NOTE — FSED PROVIDER NOTE
[facility]    EMERGENCY DEPARTMENT ENCOUNTER    Room Number:  10/10  Date seen:  1/17/2024  Time seen: 13:52 EST  PCP: Gokul Santos MD  Historian: patient     HPI:  Chief complaint:exposure to covid  Context:Ani Fields is a 28 y.o. female who presents to the ED with c/o exposure to covid.  The patient states that she has been having a nonproductive cough for the last several weeks. The patient reports that she is 38 weeks pregnant. She denies any abdominal pain, vaginal bleeding, or discharge. The patient reports that she was exposed to someone that has covid. She also reports that she had a sinus headache yesterday and took some Tylenol which helped. The patient is nontoxic in appearance. She also reports that she has been having ear congestions for the last several days.     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    Bacterial endocarditis 08/22/2022    Disorder involving thrombocytopenia 06/24/2022    Endocarditis 07/02/2022    S/P TVR (tricuspid valve replacement) 08/22/2022    Pacemaker 11/22/2023     Resolved Ambulatory Problems     Diagnosis Date Noted    No Resolved Ambulatory Problems     Past Medical History:   Diagnosis Date    ADHD     Anxiety     Asymptomatic bacteriuria     Bipolar 1 disorder     Candida esophagitis     Depression     Hepatitis C     Hypoalbuminemia     Hypomagnesemia     IV drug  user     Mycotic aneurysm     Normocytic anemia     Thrombocytopenia        PAST SURGICAL HISTORY  Past Surgical History:   Procedure Laterality Date    PACEMAKER IMPLANTATION         FAMILY HISTORY  Family History   Problem Relation Age of Onset    No Known Problems Mother     No Known Problems Father        SOCIAL HISTORY  Social History     Socioeconomic History    Marital status: Single    Number of children: 1   Tobacco Use    Smoking status: Every Day     Packs/day: 0.50     Years: 13.00     Additional pack years: 0.00     Total pack years: 6.50     Types: Cigarettes    Smokeless tobacco: Never   Vaping Use    Vaping Use: Never used   Substance and Sexual Activity    Alcohol use: Not Currently    Drug use: Yes     Types: IV, Marijuana, Methamphetamines    Sexual activity: Yes     Partners: Male     Birth control/protection: None       REVIEW OF SYSTEMS  Review of Systems    All systems reviewed and negative except for those discussed in HPI.     PHYSICAL EXAM    I have reviewed the triage vital signs and nursing notes.    ED Triage Vitals [01/17/24 1340]   Temp Heart Rate Resp BP SpO2   98.3 °F (36.8 °C) 78 20 108/61 100 %      Temp src Heart Rate Source Patient Position BP Location FiO2 (%)   -- -- -- -- --       Physical Exam  Constitutional:       Appearance: Normal appearance. She is not toxic-appearing.   HENT:      Right Ear: Tympanic membrane and ear canal normal.      Left Ear: Tympanic membrane and ear canal normal.      Nose: Nose normal.      Mouth/Throat:      Mouth: Mucous membranes are moist.      Pharynx: Oropharynx is clear. No oropharyngeal exudate or posterior oropharyngeal erythema.   Eyes:      Conjunctiva/sclera: Conjunctivae normal.      Pupils: Pupils are equal, round, and reactive to light.   Cardiovascular:      Rate and Rhythm: Normal rate and regular rhythm.      Pulses: Normal pulses.      Heart sounds: Normal heart sounds.   Pulmonary:      Effort: Pulmonary effort is normal.       Breath sounds: Normal breath sounds.   Musculoskeletal:         General: Normal range of motion.      Cervical back: Normal range of motion.   Skin:     General: Skin is warm.   Neurological:      Mental Status: She is alert.   Psychiatric:         Mood and Affect: Mood normal.         Behavior: Behavior normal.         Vital signs and nursing notes reviewed.        LAB RESULTS  Recent Results (from the past 24 hour(s))   COVID-19 and FLU A/B PCR, 1 HR TAT - Swab, Nasopharynx    Collection Time: 01/17/24  1:42 PM    Specimen: Nasopharynx; Swab   Result Value Ref Range    COVID19 Detected (C) Not Detected - Ref. Range    Influenza A PCR Not Detected Not Detected    Influenza B PCR Not Detected Not Detected       Ordered the above labs and independently reviewed the results.      RADIOLOGY RESULTS  No Radiology Exams Resulted Within Past 24 Hours       I ordered the above noted radiological studies. Independently reviewed by me and discussed with radiologist.  See dictation above for official radiology interpretation.      Orders placed during this visit:  Orders Placed This Encounter   Procedures    COVID-19 and FLU A/B PCR, 1 HR TAT - Swab, Nasopharynx           PROCEDURES    Procedures        MEDICATIONS GIVEN IN ER    Medications - No data to display      PROGRESS, DATA ANALYSIS, CONSULTS, AND MEDICAL DECISION MAKING    All labs have been independently reviewed by me.  All radiology studies have been reviewed by me.   EKG's independently reviewed by me.  Discussion below represents my analysis of pertinent findings related to patient's condition, differential diagnosis, treatment plan and final disposition.    I rechecked the patient.  I discussed the patient's labs, radiology findings (including all incidental findings), diagnosis, and plan for discharge.  A repeat exam reveals no new worrisome changes from my initial exam findings.  The patient understands that the fact that they are being discharged does not  denote that nothing is abnormal, it indicates that no clinical emergency is present and that they must follow-up as directed in order to properly maintain their health.  Follow-up instructions (specifically listed below) and return to ER precautions were given at this time.  I specifically instructed the patient to follow-up with their PCP.  The patient understands and agrees with the plan, and is ready for discharge.  All questions answered.    ED Course as of 01/17/24 1417   Wed Jan 17, 2024   1406 COVID19(!!): Detected [KJ]   1406 Influenza A PCR: Not Detected [KJ]   1406 Influenza B PCR: Not Detected [KJ]      ED Course User Index  [KJ] Zakia Clark, APRN       AS OF 14:17 EST VITALS:    BP - 108/61  HR - 78  TEMP - 98.3 °F (36.8 °C)  02 SATS - 100%    Medical Decision Making  MEDICAL DECISION  Lab interpretation:  Labs viewed by me significant for,    This patient presents with symptoms suspicious for likely viral upper respiratory infection. Based on history and physical doubt sinusitis. COVID test was positive. Do not suspect underlying cardiopulmonary process. I considered, but think unlikely, dangerous causes of this patient´s symptoms to include ACS, CHF or COPD exacerbations, pneumonia, pneumothorax. Patient is nontoxic appearing and not in need of emergent medical intervention. We discussed her discharge instructions. The patient was instructed to follow up with pcp and OBGYN. The patient was given strict return precautions with understanding.     Problems Addressed:  COVID-19: acute illness or injury    Amount and/or Complexity of Data Reviewed  Labs:  Decision-making details documented in ED Course.          DIAGNOSIS  Final diagnoses:   COVID-19       No orders of the defined types were placed in this encounter.          I performed hand hygiene on entry into the pt room and upon exit.     Note Disclaimer: At [facility], we believe that sharing information builds trust and better  relationships. You are receiving this note because you recently visited [facility]. It is possible you will see health information before a provider has talked with you about it. This kind of information can be easy to misunderstand. To help you fully understand what it means for your health, we urge you to discuss this note with your provider.         Part of this note may be an electronic transcription/translation of spoken language to printed text using the Dragon Dictation System.     Appropriate PPE worn during exam.    Dictated utilizing Dragon dictation     Note Disclaimer: At [facility], we believe that sharing information builds trust and better relationships. You are receiving this note because you recently visited [facility]. It is possible you will see health information before a provider has talked with you about it. This kind of information can be easy to misunderstand. To help you fully understand what it means for your health, we urge you to discuss this note with your provider.

## 2024-01-18 ENCOUNTER — PREP FOR SURGERY (OUTPATIENT)
Dept: OTHER | Facility: HOSPITAL | Age: 29
End: 2024-01-18
Payer: MEDICAID

## 2024-01-18 RX ORDER — MISOPROSTOL 200 UG/1
800 TABLET ORAL AS NEEDED
OUTPATIENT
Start: 2024-01-18

## 2024-01-18 RX ORDER — METHYLERGONOVINE MALEATE 0.2 MG/ML
200 INJECTION INTRAVENOUS ONCE AS NEEDED
OUTPATIENT
Start: 2024-01-18

## 2024-01-18 RX ORDER — ONDANSETRON 2 MG/ML
4 INJECTION INTRAMUSCULAR; INTRAVENOUS EVERY 6 HOURS PRN
OUTPATIENT
Start: 2024-01-18

## 2024-01-18 RX ORDER — OXYTOCIN-SODIUM CHLORIDE 0.9% IV SOLN 30 UNIT/500ML 30-0.9/5 UT/ML-%
250 SOLUTION INTRAVENOUS CONTINUOUS
OUTPATIENT
Start: 2024-01-18 | End: 2024-01-18

## 2024-01-18 RX ORDER — ACETAMINOPHEN 325 MG/1
650 TABLET ORAL EVERY 4 HOURS PRN
OUTPATIENT
Start: 2024-01-18

## 2024-01-18 RX ORDER — IBUPROFEN 600 MG/1
600 TABLET ORAL EVERY 6 HOURS PRN
OUTPATIENT
Start: 2024-01-18

## 2024-01-18 RX ORDER — ONDANSETRON 4 MG/1
4 TABLET, ORALLY DISINTEGRATING ORAL EVERY 6 HOURS PRN
OUTPATIENT
Start: 2024-01-18

## 2024-01-18 RX ORDER — SODIUM CHLORIDE 0.9 % (FLUSH) 0.9 %
10 SYRINGE (ML) INJECTION AS NEEDED
OUTPATIENT
Start: 2024-01-18

## 2024-01-18 RX ORDER — LIDOCAINE HYDROCHLORIDE 10 MG/ML
0.5 INJECTION, SOLUTION EPIDURAL; INFILTRATION; INTRACAUDAL; PERINEURAL ONCE AS NEEDED
OUTPATIENT
Start: 2024-01-18

## 2024-01-18 RX ORDER — OXYTOCIN-SODIUM CHLORIDE 0.9% IV SOLN 30 UNIT/500ML 30-0.9/5 UT/ML-%
2 SOLUTION INTRAVENOUS
OUTPATIENT
Start: 2024-01-25

## 2024-01-18 RX ORDER — SODIUM CHLORIDE 9 MG/ML
40 INJECTION, SOLUTION INTRAVENOUS AS NEEDED
OUTPATIENT
Start: 2024-01-18

## 2024-01-18 RX ORDER — SODIUM CHLORIDE, SODIUM LACTATE, POTASSIUM CHLORIDE, CALCIUM CHLORIDE 600; 310; 30; 20 MG/100ML; MG/100ML; MG/100ML; MG/100ML
125 INJECTION, SOLUTION INTRAVENOUS CONTINUOUS
OUTPATIENT
Start: 2024-01-18

## 2024-01-18 RX ORDER — SODIUM CHLORIDE 0.9 % (FLUSH) 0.9 %
10 SYRINGE (ML) INJECTION EVERY 12 HOURS SCHEDULED
OUTPATIENT
Start: 2024-01-18

## 2024-01-18 RX ORDER — OXYTOCIN-SODIUM CHLORIDE 0.9% IV SOLN 30 UNIT/500ML 30-0.9/5 UT/ML-%
999 SOLUTION INTRAVENOUS ONCE
OUTPATIENT
Start: 2024-01-18 | End: 2024-01-18

## 2024-01-18 RX ORDER — CARBOPROST TROMETHAMINE 250 UG/ML
250 INJECTION, SOLUTION INTRAMUSCULAR AS NEEDED
OUTPATIENT
Start: 2024-01-18

## 2024-01-25 ENCOUNTER — ANESTHESIA EVENT (OUTPATIENT)
Dept: LABOR AND DELIVERY | Facility: HOSPITAL | Age: 29
End: 2024-01-25
Payer: MEDICAID

## 2024-01-25 ENCOUNTER — ANESTHESIA (OUTPATIENT)
Dept: LABOR AND DELIVERY | Facility: HOSPITAL | Age: 29
End: 2024-01-25
Payer: MEDICAID

## 2024-01-25 ENCOUNTER — HOSPITAL ENCOUNTER (INPATIENT)
Facility: HOSPITAL | Age: 29
LOS: 2 days | Discharge: HOME | End: 2024-01-27
Attending: OBSTETRICS & GYNECOLOGY | Admitting: OBSTETRICS & GYNECOLOGY
Payer: MEDICAID

## 2024-01-25 ENCOUNTER — HOSPITAL ENCOUNTER (OUTPATIENT)
Dept: LABOR AND DELIVERY | Facility: HOSPITAL | Age: 29
Discharge: HOME | End: 2024-01-25
Payer: MEDICAID

## 2024-01-25 PROBLEM — Z34.90 PREGNANCY: Status: ACTIVE | Noted: 2024-01-25

## 2024-01-25 LAB
ABO GROUP BLD: NORMAL
ANION GAP SERPL CALCULATED.3IONS-SCNC: 9 MMOL/L (ref 5–15)
BASOPHILS # BLD AUTO: 0 10*3/MM3 (ref 0–0.2)
BASOPHILS NFR BLD AUTO: 0.2 % (ref 0–1.5)
BLD GP AB SCN SERPL QL: NEGATIVE
BUN SERPL-MCNC: 8 MG/DL (ref 6–20)
BUN/CREAT SERPL: 12.7 (ref 7–25)
CALCIUM SPEC-SCNC: 8.4 MG/DL (ref 8.6–10.5)
CHLORIDE SERPL-SCNC: 103 MMOL/L (ref 98–107)
CO2 SERPL-SCNC: 23 MMOL/L (ref 22–29)
CREAT SERPL-MCNC: 0.63 MG/DL (ref 0.57–1)
DEPRECATED RDW RBC AUTO: 46.8 FL (ref 37–54)
EGFRCR SERPLBLD CKD-EPI 2021: 124.1 ML/MIN/1.73
EOSINOPHIL # BLD AUTO: 0.1 10*3/MM3 (ref 0–0.4)
EOSINOPHIL NFR BLD AUTO: 0.8 % (ref 0.3–6.2)
ERYTHROCYTE [DISTWIDTH] IN BLOOD BY AUTOMATED COUNT: 14.5 % (ref 12.3–15.4)
GLUCOSE SERPL-MCNC: 59 MG/DL (ref 65–99)
HCT VFR BLD AUTO: 32.2 % (ref 34–46.6)
HGB BLD-MCNC: 10.6 G/DL (ref 12–15.9)
LYMPHOCYTES # BLD AUTO: 2 10*3/MM3 (ref 0.7–3.1)
LYMPHOCYTES NFR BLD AUTO: 23.7 % (ref 19.6–45.3)
MAGNESIUM SERPL-MCNC: 1.5 MG/DL (ref 1.6–2.6)
MCH RBC QN AUTO: 30.5 PG (ref 26.6–33)
MCHC RBC AUTO-ENTMCNC: 33 G/DL (ref 31.5–35.7)
MCV RBC AUTO: 92.5 FL (ref 79–97)
MONOCYTES # BLD AUTO: 0.5 10*3/MM3 (ref 0.1–0.9)
MONOCYTES NFR BLD AUTO: 5.6 % (ref 5–12)
NEUTROPHILS NFR BLD AUTO: 5.9 10*3/MM3 (ref 1.7–7)
NEUTROPHILS NFR BLD AUTO: 69.7 % (ref 42.7–76)
NRBC BLD AUTO-RTO: 0 /100 WBC (ref 0–0.2)
PLATELET # BLD AUTO: 158 10*3/MM3 (ref 140–450)
PMV BLD AUTO: 10.9 FL (ref 6–12)
POTASSIUM SERPL-SCNC: 4.2 MMOL/L (ref 3.5–5.2)
QT INTERVAL: 416 MS
QTC INTERVAL: 431 MS
RBC # BLD AUTO: 3.48 10*6/MM3 (ref 3.77–5.28)
RH BLD: POSITIVE
RPR SER QL: NORMAL
SODIUM SERPL-SCNC: 135 MMOL/L (ref 136–145)
T PALLIDUM IGG SER QL: REACTIVE
T&S EXPIRATION DATE: NORMAL
TSH SERPL DL<=0.05 MIU/L-ACNC: 1.82 UIU/ML (ref 0.27–4.2)
WBC NRBC COR # BLD AUTO: 8.5 10*3/MM3 (ref 3.4–10.8)

## 2024-01-25 PROCEDURE — 86901 BLOOD TYPING SEROLOGIC RH(D): CPT | Performed by: OBSTETRICS & GYNECOLOGY

## 2024-01-25 PROCEDURE — 86850 RBC ANTIBODY SCREEN: CPT | Performed by: OBSTETRICS & GYNECOLOGY

## 2024-01-25 PROCEDURE — 80048 BASIC METABOLIC PNL TOTAL CA: CPT | Performed by: NURSE PRACTITIONER

## 2024-01-25 PROCEDURE — 0 CLINDAMYCIN PER 300 MG: Performed by: OBSTETRICS & GYNECOLOGY

## 2024-01-25 PROCEDURE — 93005 ELECTROCARDIOGRAM TRACING: CPT | Performed by: INTERNAL MEDICINE

## 2024-01-25 PROCEDURE — 3E033VJ INTRODUCTION OF OTHER HORMONE INTO PERIPHERAL VEIN, PERCUTANEOUS APPROACH: ICD-10-PCS | Performed by: OBSTETRICS & GYNECOLOGY

## 2024-01-25 PROCEDURE — 25010000002 ONDANSETRON PER 1 MG: Performed by: OBSTETRICS & GYNECOLOGY

## 2024-01-25 PROCEDURE — 25810000003 LACTATED RINGERS PER 1000 ML: Performed by: OBSTETRICS & GYNECOLOGY

## 2024-01-25 PROCEDURE — 84443 ASSAY THYROID STIM HORMONE: CPT | Performed by: NURSE PRACTITIONER

## 2024-01-25 PROCEDURE — 99222 1ST HOSP IP/OBS MODERATE 55: CPT | Performed by: INTERNAL MEDICINE

## 2024-01-25 PROCEDURE — 86900 BLOOD TYPING SEROLOGIC ABO: CPT | Performed by: OBSTETRICS & GYNECOLOGY

## 2024-01-25 PROCEDURE — 86780 TREPONEMA PALLIDUM: CPT | Performed by: OBSTETRICS & GYNECOLOGY

## 2024-01-25 PROCEDURE — 25810000003 LACTATED RINGERS PER 1000 ML: Performed by: NURSE ANESTHETIST, CERTIFIED REGISTERED

## 2024-01-25 PROCEDURE — 93010 ELECTROCARDIOGRAM REPORT: CPT | Performed by: INTERNAL MEDICINE

## 2024-01-25 PROCEDURE — 85025 COMPLETE CBC W/AUTO DIFF WBC: CPT | Performed by: OBSTETRICS & GYNECOLOGY

## 2024-01-25 PROCEDURE — 83735 ASSAY OF MAGNESIUM: CPT | Performed by: NURSE PRACTITIONER

## 2024-01-25 PROCEDURE — C1755 CATHETER, INTRASPINAL: HCPCS | Performed by: ANESTHESIOLOGY

## 2024-01-25 RX ORDER — PRENATAL VIT/IRON FUM/FOLIC AC 27MG-0.8MG
1 TABLET ORAL DAILY
Status: DISCONTINUED | OUTPATIENT
Start: 2024-01-25 | End: 2024-01-27 | Stop reason: HOSPADM

## 2024-01-25 RX ORDER — BISACODYL 10 MG
10 SUPPOSITORY, RECTAL RECTAL DAILY PRN
Status: DISCONTINUED | OUTPATIENT
Start: 2024-01-26 | End: 2024-01-27 | Stop reason: HOSPADM

## 2024-01-25 RX ORDER — OXYTOCIN-SODIUM CHLORIDE 0.9% IV SOLN 30 UNIT/500ML 30-0.9/5 UT/ML-%
125 SOLUTION INTRAVENOUS CONTINUOUS PRN
Status: DISCONTINUED | OUTPATIENT
Start: 2024-01-25 | End: 2024-01-27 | Stop reason: HOSPADM

## 2024-01-25 RX ORDER — LIDOCAINE HYDROCHLORIDE 10 MG/ML
0.5 INJECTION, SOLUTION EPIDURAL; INFILTRATION; INTRACAUDAL; PERINEURAL ONCE AS NEEDED
Status: DISCONTINUED | OUTPATIENT
Start: 2024-01-25 | End: 2024-01-25 | Stop reason: HOSPADM

## 2024-01-25 RX ORDER — SODIUM CHLORIDE, SODIUM LACTATE, POTASSIUM CHLORIDE, CALCIUM CHLORIDE 600; 310; 30; 20 MG/100ML; MG/100ML; MG/100ML; MG/100ML
INJECTION, SOLUTION INTRAVENOUS CONTINUOUS PRN
Status: DISCONTINUED | OUTPATIENT
Start: 2024-01-25 | End: 2024-01-25 | Stop reason: SURG

## 2024-01-25 RX ORDER — SODIUM CHLORIDE, SODIUM LACTATE, POTASSIUM CHLORIDE, CALCIUM CHLORIDE 600; 310; 30; 20 MG/100ML; MG/100ML; MG/100ML; MG/100ML
125 INJECTION, SOLUTION INTRAVENOUS CONTINUOUS
Status: DISCONTINUED | OUTPATIENT
Start: 2024-01-25 | End: 2024-01-26

## 2024-01-25 RX ORDER — ENOXAPARIN SODIUM 100 MG/ML
40 INJECTION SUBCUTANEOUS EVERY 24 HOURS
Status: DISCONTINUED | OUTPATIENT
Start: 2024-01-26 | End: 2024-01-26

## 2024-01-25 RX ORDER — SODIUM CHLORIDE 9 MG/ML
40 INJECTION, SOLUTION INTRAVENOUS AS NEEDED
Status: DISCONTINUED | OUTPATIENT
Start: 2024-01-25 | End: 2024-01-25

## 2024-01-25 RX ORDER — EPHEDRINE SULFATE 5 MG/ML
10 INJECTION INTRAVENOUS
Status: DISCONTINUED | OUTPATIENT
Start: 2024-01-25 | End: 2024-01-25 | Stop reason: HOSPADM

## 2024-01-25 RX ORDER — ACETAMINOPHEN 325 MG/1
650 TABLET ORAL EVERY 6 HOURS PRN
Status: DISCONTINUED | OUTPATIENT
Start: 2024-01-25 | End: 2024-01-27 | Stop reason: HOSPADM

## 2024-01-25 RX ORDER — ONDANSETRON 4 MG/1
4 TABLET, ORALLY DISINTEGRATING ORAL EVERY 6 HOURS PRN
Status: DISCONTINUED | OUTPATIENT
Start: 2024-01-25 | End: 2024-01-25 | Stop reason: HOSPADM

## 2024-01-25 RX ORDER — BUPRENORPHINE HYDROCHLORIDE AND NALOXONE HYDROCHLORIDE DIHYDRATE 2; .5 MG/1; MG/1
2 TABLET SUBLINGUAL DAILY
Status: DISCONTINUED | OUTPATIENT
Start: 2024-01-25 | End: 2024-01-27 | Stop reason: HOSPADM

## 2024-01-25 RX ORDER — CLINDAMYCIN PHOSPHATE 900 MG/50ML
900 INJECTION, SOLUTION INTRAVENOUS EVERY 8 HOURS
Status: DISCONTINUED | OUTPATIENT
Start: 2024-01-25 | End: 2024-01-25 | Stop reason: HOSPADM

## 2024-01-25 RX ORDER — OXYTOCIN/0.9 % SODIUM CHLORIDE 30/500 ML
250 PLASTIC BAG, INJECTION (ML) INTRAVENOUS CONTINUOUS
Status: ACTIVE | OUTPATIENT
Start: 2024-01-25 | End: 2024-01-25

## 2024-01-25 RX ORDER — HYDROCODONE BITARTRATE AND ACETAMINOPHEN 10; 325 MG/1; MG/1
1 TABLET ORAL EVERY 4 HOURS PRN
Status: DISCONTINUED | OUTPATIENT
Start: 2024-01-25 | End: 2024-01-27 | Stop reason: HOSPADM

## 2024-01-25 RX ORDER — CARBOPROST TROMETHAMINE 250 UG/ML
250 INJECTION, SOLUTION INTRAMUSCULAR AS NEEDED
Status: DISCONTINUED | OUTPATIENT
Start: 2024-01-25 | End: 2024-01-25 | Stop reason: HOSPADM

## 2024-01-25 RX ORDER — SODIUM CHLORIDE 0.9 % (FLUSH) 0.9 %
10 SYRINGE (ML) INJECTION EVERY 12 HOURS SCHEDULED
Status: DISCONTINUED | OUTPATIENT
Start: 2024-01-25 | End: 2024-01-25

## 2024-01-25 RX ORDER — IBUPROFEN 600 MG/1
600 TABLET, FILM COATED ORAL EVERY 6 HOURS PRN
Status: DISCONTINUED | OUTPATIENT
Start: 2024-01-25 | End: 2024-01-27 | Stop reason: HOSPADM

## 2024-01-25 RX ORDER — FENTANYL/BUPIVACAINE/NS/PF 2-1250MCG
PLASTIC BAG, INJECTION (ML) INJECTION CONTINUOUS
Status: DISCONTINUED | OUTPATIENT
Start: 2024-01-25 | End: 2024-01-26

## 2024-01-25 RX ORDER — HYDROCODONE BITARTRATE AND ACETAMINOPHEN 5; 325 MG/1; MG/1
1 TABLET ORAL EVERY 4 HOURS PRN
Status: DISCONTINUED | OUTPATIENT
Start: 2024-01-25 | End: 2024-01-27 | Stop reason: HOSPADM

## 2024-01-25 RX ORDER — LIDOCAINE HCL/EPINEPHRINE/PF 2%-1:200K
VIAL (ML) INJECTION AS NEEDED
Status: DISCONTINUED | OUTPATIENT
Start: 2024-01-25 | End: 2024-01-25 | Stop reason: SURG

## 2024-01-25 RX ORDER — DOCUSATE SODIUM 100 MG/1
100 CAPSULE, LIQUID FILLED ORAL 2 TIMES DAILY
Status: DISCONTINUED | OUTPATIENT
Start: 2024-01-25 | End: 2024-01-27 | Stop reason: HOSPADM

## 2024-01-25 RX ORDER — METHYLERGONOVINE MALEATE 0.2 MG/ML
200 INJECTION INTRAVENOUS ONCE AS NEEDED
Status: DISCONTINUED | OUTPATIENT
Start: 2024-01-25 | End: 2024-01-25 | Stop reason: HOSPADM

## 2024-01-25 RX ORDER — ONDANSETRON 4 MG/1
4 TABLET, ORALLY DISINTEGRATING ORAL EVERY 8 HOURS PRN
Status: DISCONTINUED | OUTPATIENT
Start: 2024-01-25 | End: 2024-01-27 | Stop reason: HOSPADM

## 2024-01-25 RX ORDER — HYDROCORTISONE ACETATE PRAMOXINE HCL 2.5; 1 G/100G; G/100G
1 CREAM TOPICAL AS NEEDED
Status: DISCONTINUED | OUTPATIENT
Start: 2024-01-25 | End: 2024-01-27 | Stop reason: HOSPADM

## 2024-01-25 RX ORDER — ACETAMINOPHEN 325 MG/1
650 TABLET ORAL EVERY 4 HOURS PRN
Status: DISCONTINUED | OUTPATIENT
Start: 2024-01-25 | End: 2024-01-25 | Stop reason: HOSPADM

## 2024-01-25 RX ORDER — OXYTOCIN/0.9 % SODIUM CHLORIDE 30/500 ML
999 PLASTIC BAG, INJECTION (ML) INTRAVENOUS ONCE
Status: COMPLETED | OUTPATIENT
Start: 2024-01-25 | End: 2024-01-25

## 2024-01-25 RX ORDER — SODIUM CHLORIDE 0.9 % (FLUSH) 0.9 %
10 SYRINGE (ML) INJECTION AS NEEDED
Status: DISCONTINUED | OUTPATIENT
Start: 2024-01-25 | End: 2024-01-25

## 2024-01-25 RX ORDER — FENTANYL/BUPIVACAINE/NS/PF 2-1250MCG
PLASTIC BAG, INJECTION (ML) INJECTION
Status: COMPLETED
Start: 2024-01-25 | End: 2024-01-25

## 2024-01-25 RX ORDER — IBUPROFEN 600 MG/1
600 TABLET, FILM COATED ORAL EVERY 6 HOURS PRN
Status: DISCONTINUED | OUTPATIENT
Start: 2024-01-25 | End: 2024-01-25 | Stop reason: HOSPADM

## 2024-01-25 RX ORDER — MISOPROSTOL 200 UG/1
800 TABLET ORAL AS NEEDED
Status: DISCONTINUED | OUTPATIENT
Start: 2024-01-25 | End: 2024-01-25 | Stop reason: HOSPADM

## 2024-01-25 RX ORDER — FENTANYL 0.2 MG/100ML-BUPIV 0.125%-NACL 0.9% EPIDURAL INJ 2/0.125 MCG/ML-%
SOLUTION INJECTION CONTINUOUS PRN
Status: DISCONTINUED | OUTPATIENT
Start: 2024-01-25 | End: 2024-01-25 | Stop reason: SURG

## 2024-01-25 RX ORDER — ONDANSETRON 2 MG/ML
4 INJECTION INTRAMUSCULAR; INTRAVENOUS EVERY 6 HOURS PRN
Status: DISCONTINUED | OUTPATIENT
Start: 2024-01-25 | End: 2024-01-25 | Stop reason: HOSPADM

## 2024-01-25 RX ORDER — OXYTOCIN/0.9 % SODIUM CHLORIDE 30/500 ML
2 PLASTIC BAG, INJECTION (ML) INTRAVENOUS
Status: DISCONTINUED | OUTPATIENT
Start: 2024-01-25 | End: 2024-01-25 | Stop reason: HOSPADM

## 2024-01-25 RX ADMIN — DOCUSATE SODIUM 100 MG: 100 CAPSULE, LIQUID FILLED ORAL at 20:45

## 2024-01-25 RX ADMIN — Medication 2 MILLI-UNITS/MIN: at 07:58

## 2024-01-25 RX ADMIN — Medication 10 ML/HR: at 09:38

## 2024-01-25 RX ADMIN — LIDOCAINE HYDROCHLORIDE AND EPINEPHRINE 3 ML: 20; 5 INJECTION, SOLUTION EPIDURAL; INFILTRATION; INTRACAUDAL; PERINEURAL at 09:33

## 2024-01-25 RX ADMIN — ONDANSETRON 4 MG: 2 INJECTION INTRAMUSCULAR; INTRAVENOUS at 10:21

## 2024-01-25 RX ADMIN — CLINDAMYCIN PHOSPHATE 900 MG: 900 INJECTION, SOLUTION INTRAVENOUS at 07:29

## 2024-01-25 RX ADMIN — SODIUM CHLORIDE, POTASSIUM CHLORIDE, SODIUM LACTATE AND CALCIUM CHLORIDE 125 ML/HR: 600; 310; 30; 20 INJECTION, SOLUTION INTRAVENOUS at 07:44

## 2024-01-25 RX ADMIN — BUPRENORPHINE HYDROCHLORIDE AND NALOXONE HYDROCHLORIDE DIHYDRATE 2 TABLET: 2; .5 TABLET SUBLINGUAL at 20:45

## 2024-01-25 RX ADMIN — SODIUM CHLORIDE, SODIUM LACTATE, POTASSIUM CHLORIDE, AND CALCIUM CHLORIDE: .6; .31; .03; .02 INJECTION, SOLUTION INTRAVENOUS at 09:27

## 2024-01-25 RX ADMIN — Medication 999 ML/HR: at 16:40

## 2024-01-25 RX ADMIN — CLINDAMYCIN PHOSPHATE 900 MG: 900 INJECTION, SOLUTION INTRAVENOUS at 15:18

## 2024-01-25 NOTE — L&D DELIVERY NOTE
[facility]  Vaginal Delivery Note    Diagnosis     Patient is a 28 y.o. female  currently at 39w1d, who presents with orders for induction of labor..    Prenatal complications signs substance use disorder on Suboxone.  Tricuspid and mitral valve replacement due to endocarditis  related to IV drug abuse.  Hepatitis C positive antibody, negative  RNA  Positive FTA-ABS.  Negative RPR.  Positive TP antibody particle agglutination (untreated during pregnancy (.  Iron deficiency anemia.  Recent COVID positive screening  .      Delivery     Delivery:  Spontaneous Vaginal Delivery    Date of Delivery:  2024   Anesthesia: Epidural     Delivering clinician: Dakota Mendoza MD      Delivery narrative: Presents for an induction of labor at 39 weeks with a favorable cervix.  Membranes were ruptured and fluid was clear.  She started on Pitocin and progressed into active labor.  Fetal heart tones revealed good variability with low baseline of 105 bpm    She received an epidural for analgesia while in labor.  Normal progression to the active phase.  Controlled vaginal delivery over an intact perineum out of a right occiput anterior position    The infant was bulb suctioned on the perineum and had good color tone and cry was handed to waiting parents    Delayed cord clamping was observed.    The placenta delivered spontaneously intact.  Uterus contracted well and lochia became scant.  There were no lacerations of the cervix vagina or perineum.    During the active phase of labor her pacemaker alerted cardiology that she had progressed into atrial fibrillation.  Patient maintained stable vital signs with normal oxygenation throughout labor    Infant    Findings: VMI     Apgars: 89 at 1 and 5 minutes.           Placenta, Cord, and Fluid    Placenta delivered  spontaneous  clear   Episiotomy              n/a  Lacerations       none   Estimated Blood Loss 300 mL     Complications  She was noted to have a positive FTA-ABS  with a negative RPR during her prenatal care.  Initial report from the health department was this did not require treatment.  An antibody agglutination assay however was positive suggesting the need for treatment.  The infant is to be screened and will be treated appropriately.  Follow-up screen on the mother was repeated the results are pending    Her group B strep screen was positive.  She has a penicillin allergy so she was placed on clindamycin for prophylaxis.    Cardiology was alerted from the patient's pacemaker that she had developed atrial fibrillation while in the active phase of labor.  They were consulted to follow-up with the patient during her hospitalization            Dakota Mendoza MD  01/25/24  17:08 EST

## 2024-01-25 NOTE — ANESTHESIA PREPROCEDURE EVALUATION
Anesthesia Evaluation     Patient summary reviewed and Nursing notes reviewed   NPO Solid Status: < 2 hours  NPO Liquid Status: < 2 hours           Airway   Dental      Pulmonary    (+) a smoker Current, cigarettes,  Cardiovascular     (+) pacemaker pacemaker    ROS comment: PM due to endocarditis from drug use     Neuro/Psych  (+) psychiatric history Anxiety and Depression  GI/Hepatic/Renal/Endo    (+) hepatitis C, liver disease    Musculoskeletal     Abdominal    Substance History   (+) drug use      Comment: +MJ   H/O heroin use currently on suboxone    OB/GYN    (+) Pregnant        Other - negative ROS       ROS/Med Hx Other: 01/25/24 07:13  WBC: 8.50  RBC: 3.48 (L)  Hemoglobin: 10.6 (L)  Hematocrit: 32.2 (L)  Platelets: 158  RDW: 14.5  MCV: 92.5  MCH: 30.5  MCHC: 33.0  MPV: 10.9  RDW-SD: 46.8      (L): Data is abnormally low                Anesthesia Plan    ASA 3     epidural         Plan discussed with CRNA.    CODE STATUS:    Level Of Support Discussed With: Patient  Code Status (Patient has no pulse and is not breathing): CPR (Attempt to Resuscitate)  Medical Interventions (Patient has pulse or is breathing): Full Support

## 2024-01-25 NOTE — ANESTHESIA PROCEDURE NOTES
Labor Epidural    Pre-sedation assessment completed: 1/25/2024 9:27 AM    Patient reassessed immediately prior to procedure    Patient location during procedure: OB  Start Time: 1/25/2024 9:27 AM  Stop Time: 1/25/2024 9:38 AM  Indication:at surgeon's request  Performed By  CRNA/DWAYNE: Kate Pink CRNA  Preanesthetic Checklist  Completed: patient identified, IV checked, site marked, risks and benefits discussed, surgical consent, monitors and equipment checked, pre-op evaluation and timeout performed  Prep:  Pt Position:sitting  Sterile Tech:cap, gloves, mask and sterile barrier  Prep:chlorhexidine gluconate and isopropyl alcohol  Monitoring:blood pressure monitoring and continuous pulse oximetry  Epidural Block Procedure:  Approach:midline  Guidance:landmark technique  Location:L4-L5  Needle Type:Tuohy  Needle Gauge:17 G  Loss of Resistance Medium: air  Loss of Resistance: 6cm  Cath Depth at skin:11 cm  Paresthesia: none  Aspiration:negative  Test Dose:negative  Number of Attempts: 1  Post Assessment:  Dressing:biopatch applied, occlusive dressing applied and secured with tape  Pt Tolerance:patient tolerated the procedure well with no apparent complications  Complications:no

## 2024-01-25 NOTE — CONSULTS
Cardiology Consult Note    Patient Identification:  Name: Ani Fields  Age: 28 y.o.  Sex: female  :  1995  MRN: [id number]             Requesting Physician :  Dr. Dakota Mendoza     Reason for Consultation / Chief Complaint : patient co-management  Afib     History of Present Illness:      Ms. Ani Fields has past medical history of     CHB/permanent pacemaker 2022, abdominal implant, [place]  ADHD, anxiety, depression, bipolar  Hep C  History of IVDA, polysubstance abuse heroine cocaine marijuana, currently on suboxone   Endocarditis 2022  Mitral, tricuspid bioprosthetic valve replacement 22 at [facility]  History of mycotic aneurysm requiring embolization  Bacteremia with MSSA  Middle cerebral artery aneurysm 2022.  Mycotic aneurysm of left MCA  Anemia  Allergy/intolerance to amoxicillin  ? Every day smoker    Presented to Labor and Delivery for induction.   Our office pacemaker nurse,  notified me that patient has been having runs of Afib RVR intermittently over the last week.  On chart review patient had COVID last week.  She reports some intermittent chest pain that is short lived.  She denies any shortness of breath or palpitations.     She is currently in labor.  We will have pacemaker interrogated     Labs show CBC of 10.6, will check BMP and magnesium level, check TSH   Place on cardiac monitor     Further recommendations per Dr. Hernandez.   Will consider echocardiogram after patient delivers.     Electronically signed by ALTON Olmstead, 24, 4:02 PM EST.    Cardiology attending addendum :    I have personally performed a face-to-face diagnostic evaluation, physical exam and reviewed data on this patient.  I have reviewed documentation done by me and nurse practitioner  and corrected as needed.  And agree with the different components of documentation.Greater than 50% of the time spent in the care of this patient was provided by  attending consultant/me.  This is a 28-year-old with previous history of drug abuse, endocarditis valve replacement and pacemaker is here in labor and delivery for induction.         Assessment:  :     Paroxysmal atrial fibrillation  Permanent pacemaker  History of IVDA and subacute bacterial endocarditis  Status post MVR and TVR  History of Drug abuse and smoker        Recommendations / Plan:         Patient came in for induction for labor and delivery.  Patient already had a healthy baby.  Will check pacemaker device.  Patient recently had transmission from pacemaker revealing A-fib.  Will check EKG.  Will monitor telemetry.  Will see A-fib burden and rate and rhythm.  Patient's blood pressure is marginal to add any rate control drugs..  [id number] SCORE   [id number] Score: 1 (1/25/2024  6:20 PM)  Patient's [id number] score is low, will hold off on anticoagulation.  Counseled on smoking cessation and drug abuse cessation.  Counseled on smoking cessation especially since she is pregnant/postpartum.  Discussed with OB/GYN       Procedures:  Echocardiogram 6/19/2023 revealed normal LV systolic function with prosthetic valves and mitral and tricuspid position which are working well.                  No diagnosis found.           Past Medical History:  Past Medical History:   Diagnosis Date    ADHD     Anxiety     Asymptomatic bacteriuria     Bipolar 1 disorder     Candida esophagitis     Depression     Endocarditis     Hepatitis C     Hypoalbuminemia     Hypomagnesemia     IV drug user     Mycotic aneurysm     Normocytic anemia     secondary to chronic inflammation    Thrombocytopenia     at [facility] - resolved     Past Surgical History:  Past Surgical History:   Procedure Laterality Date    PACEMAKER IMPLANTATION        Allergies:  Allergies   Allergen Reactions    Keflex [Cephalexin] Other (See Comments)     Facial swelling    Amoxicillin Rash     Home Meds:  Medications Prior to Admission   Medication Sig Dispense  Refill Last Dose    buprenorphine-naloxone (SUBOXONE) 8-2 MG film film Place 2 films under the tongue Daily.       Prenatal Vit-Fe Fumarate-FA (PRENATAL VITAMIN PO) Take 1 tablet by mouth Daily.        Current Meds:     Current Facility-Administered Medications:     acetaminophen (TYLENOL) tablet 650 mg, 650 mg, Oral, Q4H PRN, Dakota Mendoza MD    acetaminophen (TYLENOL) tablet 650 mg, 650 mg, Oral, Q4H PRN, Dakota Mendoza MD    carboprost (HEMABATE) injection 250 mcg, 250 mcg, Intramuscular, PRN, Dakota Mendoza MD    clindamycin (CLEOCIN) 900 mg in sodium chloride 0.9% 50 mL IVPB (premix), 900 mg, Intravenous, Q8H, Dakota Mendoza MD, Last Rate: 50 mL/hr at 01/25/24 1518, 900 mg at 01/25/24 1518    ePHEDrine Sulfate (Pressors) 5 MG/ML injection 10 mg, 10 mg, Intravenous, Q10 Min PRN, Kate Pink CRNA    fentaNYL (2 mcg/mL) and bupivacaine (0.125%) in 100 mL NS epidural, , Epidural, Continuous, Kate Pink CRNA    ibuprofen (ADVIL,MOTRIN) tablet 600 mg, 600 mg, Oral, Q6H PRN, Dakota Mendoza MD    lactated ringers bolus 1,000 mL, 1,000 mL, Intravenous, Once, Kate Pink CRNA    lactated ringers infusion, 125 mL/hr, Intravenous, Continuous, Dakota Mendoza MD, Last Rate: 999 mL/hr at 01/25/24 1347, 999 mL/hr at 01/25/24 1347    lidocaine PF 1% (XYLOCAINE) injection 0.5 mL, 0.5 mL, Intradermal, Once PRN, Dakota Mendoza MD    methylergonovine (METHERGINE) injection 200 mcg, 200 mcg, Intramuscular, Once PRN, Dakota Mendoza MD    miSOPROStol (CYTOTEC) tablet 800 mcg, 800 mcg, Rectal, PRN, Dakota Mendoza MD    morphine injection 4 mg, 4 mg, Intravenous, Q2H PRN, Dakota Mendoza MD    ondansetron ODT (ZOFRAN-ODT) disintegrating tablet 4 mg, 4 mg, Oral, Q6H PRN **OR** ondansetron (ZOFRAN) injection 4 mg, 4 mg, Intravenous, Q6H PRN, Dakota Mendoza MD, 4 mg at 01/25/24 1021    oxytocin (PITOCIN) 30 units in 0.9% sodium chloride 500 mL  (premix), 2 cristopher-units/min, Intravenous, Titrated, Dakota Mendoza MD, Stopped at 01/25/24 1100  Social History:   Social History     Tobacco Use    Smoking status: Every Day     Packs/day: 0.50     Years: 13.00     Additional pack years: 0.00     Total pack years: 6.50     Types: Cigarettes    Smokeless tobacco: Never   Substance Use Topics    Alcohol use: Not Currently      Family History:  Family History   Problem Relation Age of Onset    No Known Problems Mother     No Known Problems Father         Review of Systems : Review of Systems   Constitutional: Positive for malaise/fatigue.   Cardiovascular:  Positive for chest pain (intermittently).   Respiratory:  Negative for cough and shortness of breath.    All other systems reviewed and are negative.          Constitutional:  Temp:  [97.4 °F (36.3 °C)-98.2 °F (36.8 °C)] 97.9 °F (36.6 °C)  Heart Rate:  [] 63  Resp:  [18-20] 18  BP: ()/(43-79) 100/59    Physical Exam   /59   Pulse 63   Temp 97.9 °F (36.6 °C)   Resp 18   LMP 05/01/2023 (Exact Date)   SpO2 100%   Breastfeeding Unknown   Physical Exam  General:  Appears in no acute distress; gravid   Eyes: Sclerae are anicteric,  conjunctivae are clear   HEENT:  No JVD. Thyroid not visibly enlarged. No mucosal pallor or cyanosis  Respiratory: Respirations regular and unlabored at rest.  Clear to auscultation  Cardiovascular: S1,S2 Regular rate and rhythm.  No murmur  Gastrointestinal: Abdomen soft  Musculoskeletal:  No abnormal movements  Extremities: No digital clubbing or cyanosis  Skin: Color pink. Skin warm and dry to touch. No rashes  No xanthoma  Neuro: Alert and awake, no lateralizing deficits appreciated    Cardiographics  ECG: EKG tracing was  personally reviewed/interpreted by me  No orders to display       Telemetry: sinus rhythm, intermittently rate 140-160s per nurse     Echocardiogram:   Results for orders placed during the hospital encounter of 06/19/23    Adult Transthoracic  Echo Complete W/ Cont if Necessary Per Protocol    Interpretation Summary    Estimated right ventricular systolic pressure from tricuspid regurgitation is normal (<35 mmHg).      Normal LV size and contractility EF of 60 to 65%  Normal RV size  Normal atrial size  Pulmonic valve is not well visualized.  Aortic valve is tricuspid and structurally normal.  Mitral valve prosthesis is seated well.  Trivial mitral regurgitation seen.  Tricuspid valve prosthesis seated well.  No significant regurgitation seen.  No pericardial effusion seen.  Proximal aorta appears normal in size.      Imaging  Chest X-ray:   Imaging Results (Last 24 Hours)       ** No results found for the last 24 hours. **            Lab Review: I have reviewed the labs                      Results from last 7 days   Lab Units 01/25/24  0713   WBC 10*3/mm3 8.50   HEMOGLOBIN g/dL 10.6*   HEMATOCRIT % 32.2*   PLATELETS 10*3/mm3 158                 Quang Hernandez MD  1/25/2024, 18:20 EST      EMR Dragon/Transcription:   Dictated utilizing Dragon dictation

## 2024-01-25 NOTE — NURSING NOTE
Cardiology NP informed RN patient has been having episodes of A-fib . Patient currently has pacemaker from previous open heart surgery in 2021.  notified and Cardiology consult was placed and notified . Will be up soon to see . Patient current HR is 60.

## 2024-01-25 NOTE — PLAN OF CARE
Goal Outcome Evaluation:       of baby boy at 1630. No lacerations, bleeding and VSS . Bonding well with infant

## 2024-01-25 NOTE — H&P
LUL Davey  Obstetric History and Physical     Chief Complaint: Duction of labor    Subjective     Patient is a 28 y.o. female  currently at 39w1d by ultrasound, who presents with risk for induction of labor.    Her prenatal care is complicated by.    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          Dr. Roman Fiore is her cardiologist  History of anemia on oral iron.  History of hepatitis C (currently RNA negative.  History of substance abuse currently taking Suboxone for management.  History of recent COVID exposure and ring positive currently asymptomatic..    Group B strep positive.  Bacillin allergy        Prenatal Information:  Prenatal Results       Initial Prenatal Labs       Test Value Reference Range Date Time    Hemoglobin  11.9 g/dL 12.0 - 15.9 23       12.1 g/dL 12.0 - 15.9 23       10.6 g/dL 12.0 - 15.9 23       11.0 g/dL 12.0 - 15.9 23 0952    Hematocrit  36.7 % 34.0 - 46.6 23       37.5 % 34.0 - 46.6 23       33.5 % 34.0 - 46.6 23       33.6 % 34.0 - 46.6 23 0952    Platelets  157 10*3/mm3 140 - 450 23       217 10*3/mm3 140 - 450 23       220 10*3/mm3 140 - 450 23       223 10*3/mm3 140 - 450 23 0952    Rubella IgG        Hepatitis B SAg  Non-Reactive  Non-Reactive 23 1454    Hepatitis C Ab  Reactive  Non-Reactive 23 1454    RPR        T. Pallidum Ab         ABO  A   23 1842    Rh  Positive   23 1842    Antibody Screen        HIV  Non-Reactive  Non-Reactive 23 1454    Urine Culture  No growth   23 1603       >100,000 CFU/mL Escherichia coli   11/15/23 1714    Gonorrhea        Chlamydia        TSH ^ 0.651 mIU/L 0.358 - 3.740 22 0241    HgB A1c         Varicella IgG        HgB Electrophoresis         Cystic fibrosis                   Fetal testing         Test Value Reference Range Date Time    NIPT        MSAFP        AFP-4                  2nd and 3rd Trimester       Test Value Reference Range Date Time    Hemoglobin (repeated)  10.6 g/dL 12.0 - 15.9 01/25/24 0713    Hematocrit (repeated)  32.2 % 34.0 - 46.6 01/25/24 0713    Platelets   158 10*3/mm3 140 - 450 01/25/24 0713       157 10*3/mm3 140 - 450 07/06/23 1916       217 10*3/mm3 140 - 450 06/09/23 1842       220 10*3/mm3 140 - 450 05/12/23 1931       223 10*3/mm3 140 - 450 05/08/23 0952    GCT        Antibody Screen (repeated)        Third Trimester syphilis screen (repeated)         GTT Fasting        GTT 1 Hr        GTT 2 Hr        GTT 3 Hr        Group B Strep                  Other testing        Test Value Reference Range Date Time    Parvo IgG         CMV IgG                   Drug Screening       Test Value Reference Range Date Time    Amphetamine Screen        Barbiturate Screen  Negative  Negative 06/09/23 1912       Negative  Negative 05/12/23 2148    Benzodiazepine Screen  Negative  Negative 06/09/23 1912       Negative  Negative 05/12/23 2148    Methadone Screen  Negative  Negative 06/09/23 1912       Negative  Negative 05/12/23 2148    Phencyclidine Screen        Opiates Screen  Negative  Negative 06/09/23 1912       Negative  Negative 05/12/23 2148    THC Screen  Positive  Negative 06/09/23 1912       Positive  Negative 05/12/23 2148    Cocaine Screen  Negative  Negative 06/09/23 1912       Negative  Negative 05/12/23 2148    Propoxyphene Screen        Buprenorphine Screen        Methamphetamine Screen        Oxycodone Screen  Negative  Negative 06/09/23 1912       Negative  Negative 05/12/23 2148    Tricyclic Antidepressants Screen                  Legend    ^: Historical                          External Prenatal Results       Pregnancy Outside Results - Transcribed From Office Records - See Scanned Records For Details       Test Value Date Time    ABO  A  06/09/23 1842    Rh  Positive   06/09/23 1842    Antibody Screen       Varicella IgG       Rubella       Hgb  10.6 g/dL 01/25/24 0713       11.9 g/dL 07/06/23 1916       12.1 g/dL 06/09/23 1842       10.6 g/dL 05/12/23 1931       11.0 g/dL 05/08/23 0952    Hct  32.2 % 01/25/24 0713       36.7 % 07/06/23 1916       37.5 % 06/09/23 1842       33.5 % 05/12/23 1931       33.6 % 05/08/23 0952    Glucose Fasting GTT       Glucose Tolerance Test 1 hour       Glucose Tolerance Test 3 hour       Gonorrhea (discrete)       Chlamydia (discrete)       RPR       VDRL       Syphilis Antibody       HBsAg  Non-Reactive  03/07/23 1454    Herpes Simplex Virus PCR       Herpes Simplex VIrus Culture       HIV  Non-Reactive  03/07/23 1454    Hep C RNA Quant PCR       Hep C Antibody  Reactive  03/07/23 1454    AFP       Group B Strep       GBS Susceptibility to Clindamycin       GBS Susceptibility to Erythromycin       Fetal Fibronectin       Genetic Testing, Maternal Blood                 Drug Screening       Test Value Date Time    Urine Drug Screen       Amphetamine Screen       Barbiturate Screen  Negative  06/09/23 1912       Negative  05/12/23 2148    Benzodiazepine Screen  Negative  06/09/23 1912       Negative  05/12/23 2148    Methadone Screen  Negative  06/09/23 1912       Negative  05/12/23 2148    Phencyclidine Screen       Opiates Screen  Negative  06/09/23 1912       Negative  05/12/23 2148    THC Screen  Positive  06/09/23 1912       Positive  05/12/23 2148    Cocaine Screen       Propoxyphene Screen       Buprenorphine Screen       Methamphetamine Screen       Oxycodone Screen  Negative  06/09/23 1912       Negative  05/12/23 2148    Tricyclic Antidepressants Screen                 Legend    ^: Historical                             Past OB History:         Past Medical History: Past Medical History:   Diagnosis Date    ADHD     Anxiety     Asymptomatic bacteriuria     Bipolar 1 disorder     Candida esophagitis     Depression     Endocarditis      Hepatitis C     Hypoalbuminemia     Hypomagnesemia     IV drug user     Mycotic aneurysm     Normocytic anemia     secondary to chronic inflammation    Thrombocytopenia     at [facility] - resolved         Past Surgical History Past Surgical History:   Procedure Laterality Date    PACEMAKER IMPLANTATION           Family History: Family History   Problem Relation Age of Onset    No Known Problems Mother     No Known Problems Father       Social History:  reports that she has been smoking cigarettes. She has a 6.50 pack-year smoking history. She has never used smokeless tobacco.   reports that she does not currently use alcohol.   reports current drug use. Drugs: IV, Marijuana, and Methamphetamines.        General ROS: Pertinent items are noted in HPI    Objective      Vitals:   There were no vitals filed for this visit.    Fetal Heart Rate Assessment:   Category 1    [place]:   External monitors     Physical Exam:     General Appearance:    Alert, cooperative, in no acute distress   Lungs:     Clear to auscultation,respirations regular.    Heart:    Regular rhythm and normal rate.   Breast Exam:    Deferred   Abdomen:     Normal bowel sounds, no masses, soft non-tender,          non-distended, no guarding, no rebound tenderness   Pelvic Exam:         Presentation: Vertex presentation    Cervix: Artificial rupture membranes with clear fluid.  2 to 3 cm dilated 90% effaced vertex at 0 station   Extremities:   Moves all extremities well, no edema, no cyanosis, no              redness   Skin:   No bleeding, bruising or rash   Neurologic:   No focal neurologic defect          Laboratory Results:   Lab Results (last 48 hours)       Procedure Component Value Units Date/Time    CBC & Differential [[id number]]  (Abnormal) Collected: 01/25/24 0713    Specimen: Blood Updated: 01/25/24 0729    Narrative:      The following orders were created for panel order CBC & Differential.  Procedure                               Abnormality          "Status                     ---------                               -----------         ------                     CBC Auto Differential[[id number]]        Abnormal            Final result                 Please view results for these tests on the individual orders.    CBC Auto Differential [[id number]]  (Abnormal) Collected: 01/25/24 0713    Specimen: Blood Updated: 01/25/24 0729     WBC 8.50 10*3/mm3      RBC 3.48 10*6/mm3      Hemoglobin 10.6 g/dL      Hematocrit 32.2 %      MCV 92.5 fL      MCH 30.5 pg      MCHC 33.0 g/dL      RDW 14.5 %      RDW-SD 46.8 fl      MPV 10.9 fL      Platelets 158 10*3/mm3      Neutrophil % 69.7 %      Lymphocyte % 23.7 %      Monocyte % 5.6 %      Eosinophil % 0.8 %      Basophil % 0.2 %      Neutrophils, Absolute 5.90 10*3/mm3      Lymphocytes, Absolute 2.00 10*3/mm3      Monocytes, Absolute 0.50 10*3/mm3      Eosinophils, Absolute 0.10 10*3/mm3      Basophils, Absolute 0.00 10*3/mm3      nRBC 0.0 /100 WBC     T Pallidum Antibody w/ reflex RPR [[id number]] Collected: 01/25/24 0713    Specimen: Blood Updated: 01/25/24 0726            Other Studies:       Assessment & Plan     Active Problems:    * No active hospital problems. *         Assessment:  1.  Intrauterine pregnancy at 39w1d gestation with reactive fetal status.    2.  induction of labor  for term  with favorable cervix and      3.  Obstetrical history significant for is non-contributory.  4.  GBS status: No results found for: \"STREPGPB\"    Plan:  1. Vaginal anticipated  2. Plan of care has been reviewed with patient.  3.  Risks, benefits of treatment plan have been discussed.  4.  All questions have been answered.  5.  Idamycin for GBS prophylaxis       Dakota Mendoza MD   1/25/2024   07:31 EST    "

## 2024-01-26 LAB
BASOPHILS # BLD AUTO: 0 10*3/MM3 (ref 0–0.2)
BASOPHILS NFR BLD AUTO: 0.1 % (ref 0–1.5)
DEPRECATED RDW RBC AUTO: 46.8 FL (ref 37–54)
EOSINOPHIL # BLD AUTO: 0.1 10*3/MM3 (ref 0–0.4)
EOSINOPHIL NFR BLD AUTO: 0.6 % (ref 0.3–6.2)
ERYTHROCYTE [DISTWIDTH] IN BLOOD BY AUTOMATED COUNT: 14.6 % (ref 12.3–15.4)
HCT VFR BLD AUTO: 26.3 % (ref 34–46.6)
HGB BLD-MCNC: 8.6 G/DL (ref 12–15.9)
LYMPHOCYTES # BLD AUTO: 1.6 10*3/MM3 (ref 0.7–3.1)
LYMPHOCYTES NFR BLD AUTO: 18.2 % (ref 19.6–45.3)
MCH RBC QN AUTO: 30.4 PG (ref 26.6–33)
MCHC RBC AUTO-ENTMCNC: 32.8 G/DL (ref 31.5–35.7)
MCV RBC AUTO: 92.5 FL (ref 79–97)
MONOCYTES # BLD AUTO: 0.5 10*3/MM3 (ref 0.1–0.9)
MONOCYTES NFR BLD AUTO: 5.5 % (ref 5–12)
NEUTROPHILS NFR BLD AUTO: 6.7 10*3/MM3 (ref 1.7–7)
NEUTROPHILS NFR BLD AUTO: 75.6 % (ref 42.7–76)
NRBC BLD AUTO-RTO: 0 /100 WBC (ref 0–0.2)
PLATELET # BLD AUTO: 131 10*3/MM3 (ref 140–450)
PMV BLD AUTO: 11.1 FL (ref 6–12)
RBC # BLD AUTO: 2.85 10*6/MM3 (ref 3.77–5.28)
WBC NRBC COR # BLD AUTO: 8.9 10*3/MM3 (ref 3.4–10.8)

## 2024-01-26 PROCEDURE — 99232 SBSQ HOSP IP/OBS MODERATE 35: CPT | Performed by: INTERNAL MEDICINE

## 2024-01-26 PROCEDURE — 85025 COMPLETE CBC W/AUTO DIFF WBC: CPT | Performed by: OBSTETRICS & GYNECOLOGY

## 2024-01-26 RX ORDER — FERROUS SULFATE 324(65)MG
324 TABLET, DELAYED RELEASE (ENTERIC COATED) ORAL 2 TIMES DAILY WITH MEALS
Status: DISCONTINUED | OUTPATIENT
Start: 2024-01-26 | End: 2024-01-27 | Stop reason: HOSPADM

## 2024-01-26 RX ADMIN — BUPRENORPHINE HYDROCHLORIDE AND NALOXONE HYDROCHLORIDE DIHYDRATE 1 TABLET: 2; .5 TABLET SUBLINGUAL at 09:07

## 2024-01-26 RX ADMIN — PRENATAL VITAMINS-IRON FUMARATE 27 MG IRON-FOLIC ACID 0.8 MG TABLET 1 TABLET: at 09:07

## 2024-01-26 RX ADMIN — DOCUSATE SODIUM 100 MG: 100 CAPSULE, LIQUID FILLED ORAL at 21:08

## 2024-01-26 RX ADMIN — ACETAMINOPHEN 650 MG: 325 TABLET, FILM COATED ORAL at 23:44

## 2024-01-26 RX ADMIN — FERROUS SULFATE TAB EC 324 MG (65 MG FE EQUIVALENT) 324 MG: 324 (65 FE) TABLET DELAYED RESPONSE at 21:07

## 2024-01-26 RX ADMIN — DOCUSATE SODIUM 100 MG: 100 CAPSULE, LIQUID FILLED ORAL at 09:08

## 2024-01-26 NOTE — PROGRESS NOTES
LUL Davey  Postpartum Note    Subjective   Postpartum Day 1:  Spontaneous Vaginal Delivery    Patient without complaints. Her pain is well controlled with nonsteroidal anti-inflammatory drugs. She is ambulating well.  Patient describes her bleeding as thin lochia.  Lochia appropriate for PP period.  Hgb 10.6 to 8.6 and patient reports asymptomatic.  Will start FeSO4 po BID.  Patient positive syphilis TP particle antibody and treatment needed.  Reports she has not completed treatment.  Infant testing currently pending.  Patient instructed on Doxycycline regimen for treatment and education on strict adherence to medication as prescribed.  Patient informed of partner treatment needed.  States will f/u with Health Department today or will notify MD and start Rx if unable to obtain.  RN informed and to initiate as discussed.  Patient cardiac conditions monitored with continuous monitoring and consulted with Dr. Hernandez.  Patient currently Covid + and denies any sx's at this time.      Breastfeeding: declines.    Objective     Vitals:  Vitals:    01/26/24 0300 01/26/24 0909 01/26/24 1157 01/26/24 1539   BP: 118/56 100/66 108/64 101/65   BP Location: Left arm Left arm Right arm Left arm   Patient Position: Sitting Sitting Lying Sitting   Pulse: 78 61 75 59   Resp: 17 16 16 16   Temp: 97.9 °F (36.6 °C) 98 °F (36.7 °C) 98.7 °F (37.1 °C) 97.8 °F (36.6 °C)   TempSrc: Oral Axillary Oral Oral   SpO2: 98% 99% 100% 98%   Weight:           Physical Exam:  General:  Alert and oriented x3. No acute distress.  Abdomen: abdomen is soft without significant tenderness, masses, organomegaly or guarding. Fundus: appropriate, firm, non tender  Incision: N/A  Skin: Warm, Dry  Extremities: Normal,  trace edema. Nontender     Labs:  Results from last 7 days   Lab Units 01/26/24  0525 01/25/24  0713   WBC 10*3/mm3 8.90 8.50   HEMOGLOBIN g/dL 8.6* 10.6*   HEMATOCRIT % 26.3* 32.2*   PLATELETS 10*3/mm3 131* 158     Results from last 7 days   Lab  Units 01/25/24  1852   GLUCOSE mg/dL 59*        Feeding method: Breastfeeding Status: No     Blood Type: RH Positive        Assessment & Plan     Principal Problem:    Pregnancy      Ani Fields is Day 1  post-partum from a  Spontaneous Vaginal Delivery      Plan:  routine, continue present management, encourage ambulation, and initiate Doxycycline therapy per recommendations as discussed .       Vivian Melendez, APRN  1/26/2024  17:28 EST

## 2024-01-26 NOTE — PAYOR COMM NOTE
"This is delivery notification only for Otoniel Lucas .    Pt had a vaginal delivery on 1/25/24.    Please note: This is NOT an inpatient request for prior authorization. This is just notification of delivery. If the patient exceeds the 48/96 federal guideline allowed for delivery, a prior auth form/request will be submitted. Thank you.       Otoniel Lucas (28 y.o. Female)       Date of Birth   1995    Social Security Number       Address   [address] [place] [address]    Home Phone   [phone]    N   [id number]       Oriental orthodox   Confucianism    Marital Status   Single                            Admission Date   1/25/24    Admission Type   Elective    Admitting Provider   Dakota Mendoza MD    Attending Provider   Dakota Mendoza MD    Department, Room/Bed   [facility]HER [facility]AB[facility], M405/1       Discharge Date       Discharge Disposition       Discharge Destination                                 Attending Provider: Dakota Mendoza MD    Allergies: Keflex [Cephalexin], Amoxicillin    Isolation: Enh Drop/Con   Infection: COVID (confirmed) (01/17/24)   Code Status: CPR    Ht: 165.1 cm (65\")   Wt: 74 kg (163 lb 3.2 oz)    Admission Cmt: None   Principal Problem: Pregnancy [Z34.90]                   Active Insurance as of 1/25/2024       Primary Coverage       Payor Plan Insurance Group Employer/Plan Group    [employer] [facility] PLAN        Payor Plan Address Payor Plan Phone Number Payor Plan Fax Number Effective Dates    [address] [phone]  9/1/2023 - None Entered    [place] [address]         Subscriber Name Subscriber Birth Date Member ID       OTONIEL LUCAS 1995 [id number]                     Emergency Contacts        (Rel.) Home Phone Work Phone Mobile Phone    SELENE LUCAS (Father) -- -- [phone]    LISA ESCAMILLA (Mother) -- -- [phone]    Marcos Menendez (Significant Other) -- -- [phone]               "   Operative/Procedure Notes (last 48 hours)        Dakota Mendoza MD at 24 1708          [facility]  Vaginal Delivery Note    Diagnosis     Patient is a 28 y.o. female  currently at 39w1d, who presents with orders for induction of labor..    Prenatal complications signs substance use disorder on Suboxone.  Tricuspid and mitral valve replacement due to endocarditis  related to IV drug abuse.  Hepatitis C positive antibody, negative  RNA  Positive FTA-ABS.  Negative RPR.  Positive TP antibody particle agglutination (untreated during pregnancy (.  Iron deficiency anemia.  Recent COVID positive screening  .      Delivery     Delivery:  Spontaneous Vaginal Delivery    Date of Delivery:  2024   Anesthesia: Epidural     Delivering clinician: Dakota Mendoza MD      Delivery narrative: Presents for an induction of labor at 39 weeks with a favorable cervix.  Membranes were ruptured and fluid was clear.  She started on Pitocin and progressed into active labor.  Fetal heart tones revealed good variability with low baseline of 105 bpm    She received an epidural for analgesia while in labor.  Normal progression to the active phase.  Controlled vaginal delivery over an intact perineum out of a right occiput anterior position    The infant was bulb suctioned on the perineum and had good color tone and cry was handed to waiting parents    Delayed cord clamping was observed.    The placenta delivered spontaneously intact.  Uterus contracted well and lochia became scant.  There were no lacerations of the cervix vagina or perineum.    During the active phase of labor her pacemaker alerted cardiology that she had progressed into atrial fibrillation.  Patient maintained stable vital signs with normal oxygenation throughout labor    Infant    Findings: VMI     Apgars: 89 at 1 and 5 minutes.           Placenta, Cord, and Fluid    Placenta delivered  spontaneous  clear   Episiotomy               n/a  Lacerations       none   Estimated Blood Loss 300 mL     Complications  She was noted to have a positive FTA-ABS with a negative RPR during her prenatal care.  Initial report from the health department was this did not require treatment.  An antibody agglutination assay however was positive suggesting the need for treatment.  The infant is to be screened and will be treated appropriately.  Follow-up screen on the mother was repeated the results are pending    Her group B strep screen was positive.  She has a penicillin allergy so she was placed on clindamycin for prophylaxis.    Cardiology was alerted from the patient's pacemaker that she had developed atrial fibrillation while in the active phase of labor.  They were consulted to follow-up with the patient during her hospitalization            Dakota Mendoza MD  01/25/24  17:08 EST          Electronically signed by Dakota Mendoza MD at 01/25/24 5278

## 2024-01-26 NOTE — PLAN OF CARE
Goal Outcome Evaluation:  Plan of Care Reviewed With: patient        Progress: improving  Outcome Evaluation: Pt up ad polly & ambulating to NICU to see baby.

## 2024-01-26 NOTE — PLAN OF CARE
Goal Outcome Evaluation:   Patient ambulates to NICU to visit infant frequently during the day. Patient denies pain and has no requests. Will continue to monitor.

## 2024-01-26 NOTE — PROGRESS NOTES
Cardiology Progress Note    Patient Identification:  Name: Ani Fields  Age: 28 y.o.  Sex: female  :  1995  MRN: [id number]                 Follow Up / Chief Complaint: Pacemaker, paroxysmal A-fib, valve replacement  Chief Complaint   Patient presents with    Scheduled Induction       Interval History: Patient had a healthy baby 2024.  He is in sinus rhythm today.       Subjective: Patient seen and examined.  Chart reviewed.  Labs reviewed and discussed with RN taking care of patient.      Objective:  EKG done 2024 reviewed/elevated by me reveals sinus rhythm    History of present illness:      Ms. Ani Fields has past medical history of     CHB/permanent pacemaker 2022, abdominal implant, [facility]  ADHD, anxiety, depression, bipolar  Hep C  History of IVDA, polysubstance abuse heroine cocaine marijuana, currently on suboxone   Endocarditis 2022  Mitral, tricuspid bioprosthetic valve replacement [facility] at [facility]  History of mycotic aneurysm requiring embolization  Bacteremia with MSSA  Middle cerebral artery aneurysm 2022.  Mycotic aneurysm of left MCA  Anemia  Allergy/intolerance to amoxicillin  ? Every day smoker     Presented to Labor and Delivery for induction.   Our office pacemaker nurse,  notified me that patient has been having runs of Afib RVR intermittently over the last week.  On chart review patient had COVID last week.  She reports some intermittent chest pain that is short lived.  She denies any shortness of breath or palpitations.      She is currently in labor.  We will have pacemaker interrogated      Labs show CBC of 10.6, will check BMP and magnesium level, check TSH   Place on cardiac monitor         Assessment:  :     Paroxysmal atrial fibrillation  Permanent pacemaker  History of IVDA and subacute bacterial endocarditis  Status post MVR and TVR  History of Drug abuse and smoker        Recommendations / Plan:          Patient came in for induction for labor and delivery.  Patient already had a healthy baby.  Telemetry is revealing sinus rhythm  EKG is revealing sinus rhythm.  Patient's blood pressure is marginal to add any rate control drugs..  [id number] SCORE   [id number] Score: 1 (1/25/2024  6:20 PM)  Patient's ORE2BA3-FYGe score is low, will hold off on anticoagulation.  Counseled on smoking cessation and drug abuse cessation.  Counseled on smoking cessation especially since she is pregnant/postpartum.  Will follow-up as outpatient.          Procedures:  Echocardiogram 6/19/2023 revealed normal LV systolic function with prosthetic valves and mitral and tricuspid position which are working well.               Copied text in this portion of the note has been reviewed and is accurate as of 1/26/2024    Past Medical History:  Past Medical History:   Diagnosis Date    ADHD     Anxiety     Asymptomatic bacteriuria     Bipolar 1 disorder     Candida esophagitis     Depression     Endocarditis     Hepatitis C     Hypoalbuminemia     Hypomagnesemia     IV drug user     Mycotic aneurysm     Normocytic anemia     secondary to chronic inflammation    Thrombocytopenia     at [facility] - resolved     Past Surgical History:  Past Surgical History:   Procedure Laterality Date    PACEMAKER IMPLANTATION          Social History:   Social History     Tobacco Use    Smoking status: Every Day     Packs/day: 0.50     Years: 13.00     Additional pack years: 0.00     Total pack years: 6.50     Types: Cigarettes    Smokeless tobacco: Never   Substance Use Topics    Alcohol use: Not Currently      Family History:  Family History   Problem Relation Age of Onset    No Known Problems Mother     No Known Problems Father           Allergies:  Allergies   Allergen Reactions    Keflex [Cephalexin] Other (See Comments)     Facial swelling    Amoxicillin Rash     Scheduled Meds:  buprenorphine-naloxone, 2 tablet, Daily  docusate sodium, 100 mg, BID  prenatal  vitamin, 1 tablet, Daily          Review of Systems:   ROS  Review of Systems   Constitution: Negative for chills and fever.   Cardiovascular: Negative for chest pain and palpitations.   Respiratory: Negative for cough and hemoptysis.    Gastrointestinal: Negative for nausea.        Constitutional:  Temp:  [97.4 °F (36.3 °C)-98 °F (36.7 °C)] 98 °F (36.7 °C)  Heart Rate:  [] 61  Resp:  [16-18] 16  BP: ()/(43-79) 100/66    Physical Exam   /66 (BP Location: Left arm, Patient Position: Sitting)   Pulse 61   Temp 98 °F (36.7 °C) (Axillary)   Resp 16   Wt 74 kg (163 lb 3.2 oz)   LMP 05/01/2023 (Exact Date)   SpO2 99%   Breastfeeding No   BMI 27.16 kg/m²   General:  Appears in no acute distress  Eyes: Sclera is anicteric,  conjunctiva is clear   HEENT:  No JVD. Thyroid not visibly enlarged. No mucosal pallor or cyanosis  Respiratory: Respirations regular and unlabored at rest.  Bilaterally good breath sounds, with good air entry in all fields. No crackles, rubs or wheezes auscultated  Cardiovascular: S1,S2 Regular rate and rhythm. No murmur, rub or gallop auscultated.  . No pretibial pitting edema  Gastrointestinal: Abdomen nondistended, soft  Musculoskeletal:  No abnormal movements  Extremities: No digital clubbing or cyanosis  Skin: Color pink. Skin warm and dry to touch. No rashes  No xanthoma  Neuro: Alert and awake, no lateralizing deficits appreciated    INTAKE AND OUTPUT:    Intake/Output Summary (Last 24 hours) at 1/26/2024 1341  Last data filed at 1/25/2024 1610  Gross per 24 hour   Intake --   Output 450 ml   Net -450 ml       Cardiographics  Telemetry: Sinus rhythm    ECG:   ECG 12 Lead Rhythm Change   Preliminary Result   HEART RATE= 65  bpm   RR Interval= 932  ms   VT Interval= 101  ms   P Horizontal Axis= -20  deg   P Front Axis= 43  deg   QRSD Interval= 91  ms   QT Interval= 416  ms   QTcB= 431  ms   QRS Axis= 87  deg   T Wave Axis= 53  deg   - OTHERWISE NORMAL ECG -   Sinus rhythm  "  Low voltage, precordial leads   When compared with ECG of 12-May-2023 19:03:16,   Significant rate decrease   Significant axis, voltage or hypertrophy change   Electronically Signed By:    Date and Time of Study: 2024-01-25 19:21:58      SCANNED - TELEMETRY     Final Result      SCANNED - TELEMETRY     Final Result        I have personally reviewed EKG    Echocardiogram: Results for orders placed during the hospital encounter of 06/19/23    Adult Transthoracic Echo Complete W/ Cont if Necessary Per Protocol    Interpretation Summary    Estimated right ventricular systolic pressure from tricuspid regurgitation is normal (<35 mmHg).      Normal LV size and contractility EF of 60 to 65%  Normal RV size  Normal atrial size  Pulmonic valve is not well visualized.  Aortic valve is tricuspid and structurally normal.  Mitral valve prosthesis is seated well.  Trivial mitral regurgitation seen.  Tricuspid valve prosthesis seated well.  No significant regurgitation seen.  No pericardial effusion seen.  Proximal aorta appears normal in size.      Lab Review   I have reviewed the labs      Results from last 7 days   Lab Units 01/25/24  1852   MAGNESIUM mg/dL 1.5*     Results from last 7 days   Lab Units 01/25/24  1852   SODIUM mmol/L 135*   POTASSIUM mmol/L 4.2   BUN mg/dL 8   CREATININE mg/dL 0.63   CALCIUM mg/dL 8.4*         Results from last 7 days   Lab Units 01/26/24  0525 01/25/24  0713   WBC 10*3/mm3 8.90 8.50   HEMOGLOBIN g/dL 8.6* 10.6*   HEMATOCRIT % 26.3* 32.2*   PLATELETS 10*3/mm3 131* 158           RADIOLOGY:  Imaging Results (Last 24 Hours)       ** No results found for the last 24 hours. **                  )1/26/2024  Quang Hernandez MD      EMR Dragon/Transcription:   \"Dictated utilizing Dragon dictation\".   "

## 2024-01-27 VITALS
OXYGEN SATURATION: 100 % | RESPIRATION RATE: 16 BRPM | DIASTOLIC BLOOD PRESSURE: 60 MMHG | WEIGHT: 163.2 LBS | SYSTOLIC BLOOD PRESSURE: 106 MMHG | HEART RATE: 81 BPM | TEMPERATURE: 98.3 F | BODY MASS INDEX: 27.16 KG/M2

## 2024-01-27 PROBLEM — O98.119 SYPHILIS IN PREGNANCY, ANTEPARTUM: Status: ACTIVE | Noted: 2024-01-27

## 2024-01-27 PROCEDURE — 25010000002 MEDROXYPROGESTERONE 150 MG/ML SUSPENSION: Performed by: STUDENT IN AN ORGANIZED HEALTH CARE EDUCATION/TRAINING PROGRAM

## 2024-01-27 RX ORDER — FERROUS SULFATE 325(65) MG
325 TABLET ORAL
Qty: 30 TABLET | Refills: 1 | Status: SHIPPED | OUTPATIENT
Start: 2024-01-27 | End: 2025-01-17

## 2024-01-27 RX ORDER — DOXYCYCLINE 100 MG/1
100 CAPSULE ORAL EVERY 12 HOURS SCHEDULED
Qty: 60 CAPSULE | Refills: 0 | Status: SHIPPED | OUTPATIENT
Start: 2024-01-27 | End: 2024-01-27 | Stop reason: SDUPTHER

## 2024-01-27 RX ORDER — DOXYCYCLINE 100 MG/1
100 CAPSULE ORAL EVERY 12 HOURS SCHEDULED
Qty: 56 CAPSULE | Refills: 0 | Status: DISCONTINUED | OUTPATIENT
Start: 2024-01-27 | End: 2024-01-27 | Stop reason: HOSPADM

## 2024-01-27 RX ORDER — PSEUDOEPHEDRINE HCL 30 MG
100 TABLET ORAL 2 TIMES DAILY
Qty: 60 CAPSULE | Refills: 1 | Status: SHIPPED | OUTPATIENT
Start: 2024-01-27 | End: 2024-01-27 | Stop reason: SDUPTHER

## 2024-01-27 RX ORDER — MEDROXYPROGESTERONE ACETATE 150 MG/ML
150 INJECTION, SUSPENSION INTRAMUSCULAR ONCE
Status: COMPLETED | OUTPATIENT
Start: 2024-01-27 | End: 2024-01-27

## 2024-01-27 RX ORDER — PSEUDOEPHEDRINE HCL 30 MG
100 TABLET ORAL 2 TIMES DAILY
Qty: 60 CAPSULE | Refills: 1 | Status: SHIPPED | OUTPATIENT
Start: 2024-01-27 | End: 2025-01-17

## 2024-01-27 RX ORDER — FERROUS SULFATE 325(65) MG
325 TABLET ORAL
Qty: 30 TABLET | Refills: 1 | Status: SHIPPED | OUTPATIENT
Start: 2024-01-27 | End: 2024-01-27 | Stop reason: SDUPTHER

## 2024-01-27 RX ORDER — ACETAMINOPHEN 325 MG/1
650 TABLET ORAL EVERY 6 HOURS PRN
Qty: 30 TABLET | Refills: 1 | Status: SHIPPED | OUTPATIENT
Start: 2024-01-27 | End: 2024-01-27 | Stop reason: SDUPTHER

## 2024-01-27 RX ORDER — ACETAMINOPHEN 325 MG/1
650 TABLET ORAL EVERY 6 HOURS PRN
Qty: 30 TABLET | Refills: 1 | Status: SHIPPED | OUTPATIENT
Start: 2024-01-27

## 2024-01-27 RX ORDER — DOXYCYCLINE 100 MG/1
100 CAPSULE ORAL EVERY 12 HOURS SCHEDULED
Qty: 60 CAPSULE | Refills: 0 | Status: SHIPPED | OUTPATIENT
Start: 2024-01-27 | End: 2024-02-24

## 2024-01-27 RX ADMIN — PRENATAL VITAMINS-IRON FUMARATE 27 MG IRON-FOLIC ACID 0.8 MG TABLET 1 TABLET: at 08:38

## 2024-01-27 RX ADMIN — BUPRENORPHINE HYDROCHLORIDE AND NALOXONE HYDROCHLORIDE DIHYDRATE 2 TABLET: 2; .5 TABLET SUBLINGUAL at 08:38

## 2024-01-27 RX ADMIN — DOCUSATE SODIUM 100 MG: 100 CAPSULE, LIQUID FILLED ORAL at 08:38

## 2024-01-27 RX ADMIN — DOXYCYCLINE 100 MG: 100 CAPSULE ORAL at 13:21

## 2024-01-27 RX ADMIN — MEDROXYPROGESTERONE ACETATE 150 MG: 150 INJECTION, SUSPENSION INTRAMUSCULAR at 15:33

## 2024-01-27 RX ADMIN — FERROUS SULFATE TAB EC 324 MG (65 MG FE EQUIVALENT) 324 MG: 324 (65 FE) TABLET DELAYED RESPONSE at 08:38

## 2024-01-27 NOTE — DISCHARGE SUMMARY
[facility]  Delivery Discharge Summary    Primary OB Clinician: Dakota Mendoza MD    Admission Diagnosis:  Principal Problem:    Pregnancy  Active Problems:    Syphilis in pregnancy, antepartum    Vaginal delivery  History of endocarditis, with pacemaker and prior mitral, tricuspid bioprosthetic valve replacement   History of polysubstance abuse, IV drug use   History of middle cerebral artery aneurysm   History of Hepatitis C   Opioid use disorder, on maintenance therapy  Tobacco use   Anemia    Discharge Diagnosis:  Status post vaginal delivery, see above     Gestational Age: 39w1d    Date of Delivery: 2024     Delivery Type: Vaginal, Spontaneous      Intrapartum Course: Patient presented for IOL at term with extensive medical history. See delivery note for details. Per note, uncomplicated vaginal delivery.   During intrapartum course, did have co-management with cardiology  where pace maker showed evidence of atrial fibrillation.     Postpartum Course:  S/P delivery, she did not have any cardiac concerns postpartum where patient reported any symptoms. She did have evaluation with cardiology and plan to manage patient in outpatient setting.  Pt is tolerating po well, ambulating and voiding without difficulty.  Pain is well controlled on PO pain medication. Bleeding is minimal/ appropriate for pp state. She desires depo-provera for contraception at time of discharge.     Physical Exam:    Vitals:   Vitals:    24 2018 24 2347 24 0355 24 0752   BP: 109/65 116/71 112/74 112/69   BP Location: Left arm Left arm Left arm Left arm   Patient Position: Lying Sitting Lying Sitting   Pulse: 64 60 60 63   Resp: 15 16 15 12   Temp: 98 °F (36.7 °C) 97.4 °F (36.3 °C) 97.6 °F (36.4 °C) 97.8 °F (36.6 °C)   TempSrc: Oral Oral Oral Oral   SpO2: 96% 100% 100% 100%   Weight:         Temp (24hrs), Av.7 °F (36.5 °C), Min:97.4 °F (36.3 °C), Max:98 °F (36.7 °C)      General Appearance:    Alert,  cooperative, in no acute distress   Abdomen:    Fundus firm below umbilicus, nontender           Extremities: Moves all extremities well, No edema , no cyanosis, no redness.     Labs:   Results from last 7 days   Lab Units 01/26/24  0525 01/25/24  0713   WBC 10*3/mm3 8.90 8.50   HEMOGLOBIN g/dL 8.6* 10.6*   HEMATOCRIT % 26.3* 32.2*   PLATELETS 10*3/mm3 131* 158     Results from last 7 days   Lab Units 01/25/24  1852   GLUCOSE mg/dL 59*         Discharge Medications:     Discharge Medications        New Medications        Instructions Start Date   acetaminophen 325 MG tablet  Commonly known as: TYLENOL   650 mg, Oral, Every 6 Hours PRN      docusate sodium 100 MG capsule   100 mg, Oral, 2 Times Daily      doxycycline 100 MG capsule  Commonly known as: MONODOX   100 mg, Oral, Every 12 Hours Scheduled, Will need to take this antibiotic for 4 weeks or 28 days for complete treatment of syphilis.  Partner needs to be treated as well or you will become reinfected. Do not have any intercourse until antibiotics have been fully completed.             Continue These Medications        Instructions Start Date   buprenorphine-naloxone 8-2 MG film film  Commonly known as: SUBOXONE   2 films, Sublingual, Daily      PRENATAL VITAMIN PO   1 tablet, Oral, Daily               Feeding method: Breastfeeding Status: No    Blood Type: RH Positive      Plan:  Discharge to home.    Follow-up appointment with APRN in 1-2 weeks due to extensive cardiac history and VTE concerns in postpartum period and Dr. Dakota Mendoza in 6 weeks.  All discharge instructions were reviewed with pt including bleeding warnings, s/sx of pp depression, and warning signs in the pp period for which to seek medical attention including but not limited to s/sx of hypertension and thromboembolism.    Patient had previously been started on PPX anticoagulation in PP period based on extensive cardiac history, but primary OB discussed with cardiologist, and they do not  recommend patient be on anticoagulation at this time so the medication was discontinued.   Patient has untreated syphilis in pregnancy with PCN anaphylaxis allergy. She was started on doxycycline 100mg BID, which she has to take for 28 days per CDC guidelines for treatment. Patient counseled that her and partner both need to be treated, but her partner has to go to the health department or primary care provider to be tested and for treatment with PCN.  Continue PO iron for anemia at time of discharge.

## 2024-01-27 NOTE — CASE MANAGEMENT/SOCIAL WORK
Social Work Assessment  [facility]     Patient Name: Ani Fields  MRN: [id number]  Today's Date: 1/26/2024    Admit Date: 1/25/2024     Discharge Plan       Row Name 01/26/24 2005       Plan    Plan Anticipate return home at d/c.    Patient/Family in Agreement with Plan yes    Plan Comments Met with patient at bedside. FOB present. Positive support system identified, have necessary items, etc. Mother has started enrollment with [facility] and will notify at d/c. Infant in for scoring at minimum of five days related to maternal Suboxone use (receives at [facility]). Discussed LSW role related to infant’s NICU admission. Denies any needs/questions at this time.             Marilin Nelson, ARABELLA, MSSW, [place]    Phone: [phone]  Cell: [phone]  Fax: [phone]  [email or web address]

## 2024-01-29 NOTE — CASE MANAGEMENT/SOCIAL WORK
Discharge Planning Assessment  [facility]     Patient Name: Ani Fields  MRN: [id number]  Today's Date: 1/29/2024    Admit Date: 1/25/2024    Plan: Home       Discharge Plan       Row Name 01/29/24 1844       Plan    Plan Home    Plan Comments SW met with MOB at bedside. FOB was on couch asleep. Mom gave SW permission to speak in front of significant other. SW assessed her for safety at home. Mom said she feels safe with significant other. She denied any abuse in the home. SW informed mom of infant's urine drug screen testing positive of THC. Mom said she used THC to stimulate appetite. SW informed mom that a report to [facility] is mandatory due to infant's positive drug results. Mom confirmed address and phone number. SW was called by RN after SW returned to office. Pt gave SW a [place] address. She said they will be returning to their apartment at [address] , [place], [address] instead of her Dad's address. AL called and filed report #[id number] with [place] [facility].                Astrid Dhillon, KATHRYNW, LSW, APHSW-C  Medical Social Worker  [facility]  [address] [place] [address]  Office: [phone]  Fax: [phone]

## 2024-01-29 NOTE — SIGNIFICANT NOTE
Case Management Discharge Note                Selected Continued Care - Discharged on 1/27/2024 Admission date: 1/25/2024 - Discharge disposition: Home or Self Care              Transportation Services  Private: Car    Final Discharge Disposition Code: (P) 01 - home or self-care

## 2024-01-30 LAB
RPR SER QL: NON REACTIVE
T PALLIDUM IGG+IGM SER QL: POSITIVE
TREPONEMA PALLIDUM IGG+IGM AB [PRESENCE] IN SERUM OR PLASMA BY IMMUNOASSAY: REACTIVE

## 2024-01-31 LAB
QT INTERVAL: 416 MS
QTC INTERVAL: 431 MS

## 2024-02-05 ENCOUNTER — MATERNAL SCREENING (OUTPATIENT)
Dept: CALL CENTER | Facility: HOSPITAL | Age: 29
End: 2024-02-05
Payer: MEDICAID

## 2024-02-05 NOTE — OUTREACH NOTE
Maternal Screening Survey      Flowsheet Row Responses   Facility patient discharged from? [facility]   Attempt successful? No   Unsuccessful attempts Attempt 2              Caitlin ARMAS - Registered Nurse

## 2024-02-05 NOTE — OUTREACH NOTE
Maternal Screening Survey      Flowsheet Row Responses   Facility patient discharged from? [facility]   Attempt successful? No   Unsuccessful attempts Attempt 1              Caitlin ARMAS - Registered Nurse

## 2024-02-06 ENCOUNTER — TELEPHONE (OUTPATIENT)
Dept: CARDIOLOGY | Facility: CLINIC | Age: 29
End: 2024-02-06
Payer: MEDICAID

## 2024-02-06 ENCOUNTER — MATERNAL SCREENING (OUTPATIENT)
Dept: CALL CENTER | Facility: HOSPITAL | Age: 29
End: 2024-02-06
Payer: MEDICAID

## 2024-02-06 NOTE — OUTREACH NOTE
Maternal Screening Survey      Flowsheet Row Responses   Facility patient discharged from? [facility]   Attempt successful? Yes   Call start time    Call end time    EPD Scale: Able to Laugh 0-->as much as she always could   EPD Scale: Looked Forward 0-->as much as she ever did   EPD Scale: Blamed Self 1-->not very often   EPD Scale: Been Anxious 0-->no, not at all   EPD Scale: Felt Panicky 0-->no, not at all   EPD Scale: Things Getting on Top 1-->no, most of the time has coped quite well   EPD Scale: Difficulty Sleeping 0-->no, not at all   EPD Scale: Sad or Miserable 0-->no, not at all   EPD Scale: Crying 0-->no, never   EPD Scale: Thought of Harming Self 0-->never   [place]  Depression Score 2   Did any of your parents have problems with alcohol or drug use? No   Do any of your peers have problems with alcohol or drug use? No   Does your partner have problems with alcohol or drug use? No   Before you were pregnant did you have problems with alcohol or drug use? (past) No   In the past month, did you drink beer, wine, liquor or use any other drugs? (pregnancy) No   Maternal Screening call completed Yes   Call end time               SAMAN BAGLEY - Registered Nurse

## 2024-02-06 NOTE — TELEPHONE ENCOUNTER
Caller: Ani Fields    Relationship to patient: Self    Best call back number: [phone]    Chief complaint:     Type of visit: [facility] DEVICE CHECK AND FOLLOW UP     Requested date: AS SOON AS POSSIBLE     If rescheduling, when is the original appointment: 05.22.24    Additional notes:PATIENT WENT INTO LABOR AND HAD HER CHILD ON 01.25.24 AND SHE HAS A [facility] PACEMAKER. WHEN SHE WAS IN LABOR SHE WENT INTO AFIB. PATIENT WAS ADVISED TO SEE DR. POLO AS SOON AS POSSIBLE.

## 2024-03-04 ENCOUNTER — OFFICE VISIT (OUTPATIENT)
Dept: CARDIOLOGY | Facility: CLINIC | Age: 29
End: 2024-03-04
Payer: MEDICAID

## 2024-03-04 ENCOUNTER — CLINICAL SUPPORT NO REQUIREMENTS (OUTPATIENT)
Dept: CARDIOLOGY | Facility: CLINIC | Age: 29
End: 2024-03-04
Payer: MEDICAID

## 2024-03-04 VITALS
WEIGHT: 146 LBS | HEIGHT: 65 IN | BODY MASS INDEX: 24.32 KG/M2 | DIASTOLIC BLOOD PRESSURE: 57 MMHG | HEART RATE: 74 BPM | SYSTOLIC BLOOD PRESSURE: 102 MMHG

## 2024-03-04 DIAGNOSIS — Z95.0 PACEMAKER: Primary | ICD-10-CM

## 2024-03-04 DIAGNOSIS — I48.0 PAROXYSMAL ATRIAL FIBRILLATION: ICD-10-CM

## 2024-03-04 DIAGNOSIS — Z95.4 S/P TVR (TRICUSPID VALVE REPLACEMENT): ICD-10-CM

## 2024-03-04 DIAGNOSIS — I49.5 SICK SINUS SYNDROME: ICD-10-CM

## 2024-03-04 DIAGNOSIS — Z95.2 S/P MVR (MITRAL VALVE REPLACEMENT): ICD-10-CM

## 2024-03-04 PROCEDURE — 1159F MED LIST DOCD IN RCRD: CPT | Performed by: INTERNAL MEDICINE

## 2024-03-04 PROCEDURE — 1160F RVW MEDS BY RX/DR IN RCRD: CPT | Performed by: INTERNAL MEDICINE

## 2024-03-04 PROCEDURE — 93280 PM DEVICE PROGR EVAL DUAL: CPT | Performed by: INTERNAL MEDICINE

## 2024-03-04 PROCEDURE — 99214 OFFICE O/P EST MOD 30 MIN: CPT | Performed by: INTERNAL MEDICINE

## 2024-03-04 RX ORDER — ASPIRIN 81 MG/1
81 TABLET ORAL DAILY
COMMUNITY

## 2024-03-04 NOTE — PROGRESS NOTES
Subjective:     Encounter Date:03/04/2024      Patient ID: Ani Fields is a 28 y.o. female.    Chief Complaint and history of present illness:    Follow-up for pacemaker, paroxysmal A-fib, valve replacement      History of present illness:     Ms. Ani Fields has past medical history of     CHB/permanent pacemaker 8/22/2022, abdominal implant, [facility]  Paroxysmal atrial fibrillation  ADHD, anxiety, depression, bipolar  Hep C  History of IVDA, polysubstance abuse heroine cocaine marijuana, currently on suboxone   Endocarditis 8/22/2022  Mitral, tricuspid bioprosthetic valve replacement 8/22/22 at [facility]  History of mycotic aneurysm requiring embolization  Bacteremia with MSSA  Middle cerebral artery aneurysm 9/8/2022.  Mycotic aneurysm of left MCA  Anemia  Allergy/intolerance to amoxicillin  ? Every day smoker    Here for follow-up.  Patient denies any chest pain or shortness of breath.  Has intermittent palpitations.  Patient recently was in the hospital had a healthy baby on 1/25/2024.  Patient recently had COVID the week before delivery.  And had A-fib with RVR intermittently.     Labs from 1/25/2024 reveal normal TSH at 1.82, magnesium low at 1.5, BMP with a glucose of 59, sodium 135, potassium 4.2, calcium 8.4.        Assessment:  :     Paroxysmal atrial fibrillation  Permanent pacemaker  History of IVDA and subacute bacterial endocarditis  Status post MVR and TVR  History of Drug abuse and smoker        Recommendations / Plan:           Patient's blood pressure is marginal to add any rate control drugs..    PDP2EG3-KRLG SCORE only for female gender which is not counted if there are no other risks  [id number] Score: 1 (3/4/2024  2:07 PM)  Patient's YNV8JR1-XFHw score is low, will hold off on anticoagulation.  Counseled on smoking cessation and drug abuse cessation.  Counseled on smoking cessation especially since she is pregnant/postpartum.  Will follow-up in  pacemaker clinic.  Will send to EP to evaluate and treat A-fib.           Procedures:    Echocardiogram 6/19/2023 revealed normal LV systolic function with prosthetic valves and mitral and tricuspid position which are working well.      Procedures    EKG done 1/25/2024 reviewed/elevated by me reveals sinus rhythm    Copied text in this portion of the note has been reviewed and is accurate as of 3/4/2024  The following portions of the patient's history were reviewed and updated as appropriate: allergies, current medications, past family history, past medical history, past social history, past surgical history and problem list.    Assessment:         [facility]       Diagnosis Plan   1. Pacemaker        2. S/P MVR (mitral valve replacement)        3. S/P TVR (tricuspid valve replacement)        4. Paroxysmal atrial fibrillation               Plan:               Past Medical History:  Past Medical History:   Diagnosis Date    ADHD     Anxiety     Asymptomatic bacteriuria     Bipolar 1 disorder     Candida esophagitis     Depression     Endocarditis     Hepatitis C     Hypoalbuminemia     Hypomagnesemia     IV drug user     Mycotic aneurysm     Normocytic anemia     secondary to chronic inflammation    Thrombocytopenia     at [facility] - resolved     Past Surgical History:  Past Surgical History:   Procedure Laterality Date    PACEMAKER IMPLANTATION        Allergies:  Allergies   Allergen Reactions    Keflex [Cephalexin] Other (See Comments)     Facial swelling    Amoxicillin Rash     Home Meds:  Current Meds:     Current Outpatient Medications:     acetaminophen (TYLENOL) 325 MG tablet, Take 2 tablets by mouth Every 6 (Six) Hours As Needed for Mild Pain (Second Line: Mild pain unrelieved by ibuprofen. Stagger doses 3 hours after dose of ibuprofen.)., Disp: 30 tablet, Rfl: 1    aspirin 81 MG EC tablet, Take 1 tablet by mouth Daily., Disp: , Rfl:     buprenorphine-naloxone (SUBOXONE) 8-2 MG film film, Place 2 films under the tongue  "Daily., Disp: , Rfl:     docusate sodium 100 MG capsule, Take 1 capsule by mouth 2 (Two) Times a Day., Disp: 60 capsule, Rfl: 1    ferrous sulfate 325 (65 FE) MG tablet, Take 1 tablet by mouth Daily With Breakfast., Disp: 30 tablet, Rfl: 1    Prenatal Vit-Fe Fumarate-FA (PRENATAL VITAMIN PO), Take 1 tablet by mouth Daily., Disp: , Rfl:   Social History:   Social History     Tobacco Use    Smoking status: Every Day     Current packs/day: 0.50     Average packs/day: 0.5 packs/day for 13.0 years (6.5 ttl pk-yrs)     Types: Cigarettes    Smokeless tobacco: Never   Substance Use Topics    Alcohol use: Not Currently      Family History:  Family History   Problem Relation Age of Onset    No Known Problems Mother     No Known Problems Father               ROS  All other systems are negative         Objective:     Physical Exam  /57   Pulse 74   Ht 165.1 cm (65\")   Wt 66.2 kg (146 lb)   LMP 05/01/2023 (Exact Date)   Breastfeeding No   BMI 24.30 kg/m²   General:  Appears in no acute distress  Eyes: Sclera is anicteric,  conjunctiva is clear   HEENT:  No JVD.  No carotid bruits  Respiratory: Respirations regular and unlabored at rest.  Clear to auscultation  Cardiovascular: S1,S2 Regular rate and rhythm. .   Extremities: No digital clubbing or cyanosis, no edema  Skin: Color pink. Skin warm and dry to touch. No rashes  No xanthoma  Neuro: Alert and awake.    Lab Reviewed:         Quang Hernandez MD  3/4/2024 14:14 EST      EMR Dragon/Transcription:   \"Dictated utilizing Dragon dictation\".        "

## 2024-03-19 ENCOUNTER — OFFICE VISIT (OUTPATIENT)
Dept: CARDIOLOGY | Facility: CLINIC | Age: 29
End: 2024-03-19
Payer: MEDICAID

## 2024-03-19 VITALS
HEART RATE: 83 BPM | BODY MASS INDEX: 24.83 KG/M2 | SYSTOLIC BLOOD PRESSURE: 96 MMHG | DIASTOLIC BLOOD PRESSURE: 60 MMHG | OXYGEN SATURATION: 97 % | HEIGHT: 65 IN | WEIGHT: 149 LBS

## 2024-03-19 DIAGNOSIS — Z95.2 PRESENCE OF PROSTHETIC HEART VALVE: Primary | ICD-10-CM

## 2024-03-19 DIAGNOSIS — I33.0 ACUTE BACTERIAL ENDOCARDITIS: ICD-10-CM

## 2024-03-19 DIAGNOSIS — I49.5 SICK SINUS SYNDROME: ICD-10-CM

## 2024-03-19 DIAGNOSIS — Z95.0 PACEMAKER: ICD-10-CM

## 2024-03-19 NOTE — PROGRESS NOTES
HP      Name: Ani Fields ADMIT: (Not on file)   : 1995  PCP: Gokul Santos MD    MRN: [id number] LOS: 0 days   AGE/SEX: 28 y.o. female  ROOM: Room/bed info not found     Chief Complaint   Patient presents with    Consult     NP- vicki de jesus       Subjective        History of present illness  Ani Fields is a 28-year-old female patient who has history of drug abuse with cocaine and marijuana, currently on Suboxone, has hepatitis C, ADHD, has had endocarditis and received bioprosthetic mitral and tricuspid valve on 2022 at [facility].  At that time she also received a [employer] dual-chamber pacemaker with epicardial leads on the atrium and on the ventricle and the battery is implanted in the upper abdominal area.  Patient is referred for evaluation of arrhythmia seen on device interrogations.    Past Medical History:   Diagnosis Date    ADHD     Anxiety     Asymptomatic bacteriuria     Bipolar 1 disorder     Candida esophagitis     Depression     Endocarditis     Hepatitis C     Hypoalbuminemia     Hypomagnesemia     IV drug user     Mycotic aneurysm     Normocytic anemia     secondary to chronic inflammation    Thrombocytopenia     at [facility] - resolved     Past Surgical History:   Procedure Laterality Date    PACEMAKER IMPLANTATION       Family History   Problem Relation Age of Onset    No Known Problems Mother     No Known Problems Father      Social History     Tobacco Use    Smoking status: Every Day     Current packs/day: 2.00     Average packs/day: 0.5 packs/day for 13.0 years (6.6 ttl pk-yrs)     Types: Cigarettes     Start date: 3/1/2024     Passive exposure: Past    Smokeless tobacco: Never   Vaping Use    Vaping status: Some Days    Start date: 3/19/2024   Substance Use Topics    Alcohol use: Not Currently    Drug use: Not Currently     Types: IV, Marijuana, Methamphetamines     Comment: stopped meth 2023     (Not in a hospital admission)    Allergies:  Keflex  Left message on answering machine to call back.    [cephalexin] and Amoxicillin    Review of systems    Constitutional: Negative.    Respiratory and cardiovascular: As detailed in HPI section.  Gastrointestinal: Negative for constipation, nausea and vomiting negative for abdominal distention, abdominal pain and diarrhea.   Genitourinary: Negative for difficulty urinating and flank pain.   Musculoskeletal: Negative for arthralgias, joint swelling and myalgias.   Skin: Negative for color change, rash and wound.   Neurological: Negative for dizziness, syncope, weakness and headaches.   Hematological: Negative for adenopathy.   Psychiatric/Behavioral: Negative for confusion.   All other systems reviewed and are negative.    Physical Exam  VITALS REVIEWED    General:      well developed, in no acute distress.    Head:      normocephalic and atraumatic.    Eyes:      PERRL/EOM intact, conjunctiva and sclera clear with out nystagmus.    Neck:      no masses, thyromegaly,  trachea central with normal respiratory effort and PMI displaced laterally  Lungs:      Clear to auscultation bilaterally  Heart:       Regular rate and rhythm  Msk:      no deformity or scoliosis noted of thoracic or lumbar spine.    Pulses:      pulses normal in all 4 extremities.    Extremities:       No lower extremity edema  Neurologic:      no focal deficits.   alert oriented x3  Skin:      intact without lesions or rashes.    Psych:      alert and cooperative; normal mood and affect; normal attention span and concentration.      Result Review :               Pertinent cardiac workup    Echo 6/19/2023 ejection fraction 60 to 65%  EKG 1/25/2024 sinus rhythm       Procedures        Assessment and Plan      Ani Fields is a 28-year-old female patient who has history of endocarditis and has bioprosthetic mitral and tricuspid valve replacement on 8/22/2022 along with implantation of a dual-chamber pacemaker with epicardial leads on atrium and on the ventricle.  Device interrogation is showing  appropriate function on both leads and she is paced in the atrium 19% of the time 1.5% in the RV.    There were episodes of mode switch, overall burden less than 1%.  I reviewed most of the events were in fact due to either sinus tachycardia or atrial tachycardia, I did not see convincing evidence of atrial fibrillation.  There were however some rhythm strips that were consistent with nonsustained ventricular tachycardia.  Consider adding a beta-blocker if these episodes become more frequent.  Patient has not had any syncopal episodes, her EF is normal.    Diagnoses and all orders for this visit:    1. Presence of prosthetic heart valve (Primary)    2. Sick sinus syndrome    3. Acute bacterial endocarditis    4. Pacemaker           Return if symptoms worsen or fail to improve.  Patient was given instructions and counseling regarding her condition or for health maintenance advice. Please see specific information pulled into the AVS if appropriate.

## 2024-08-25 ENCOUNTER — HOSPITAL ENCOUNTER (OUTPATIENT)
Facility: HOSPITAL | Age: 29
Discharge: HOME | End: 2024-08-25
Attending: EMERGENCY MEDICINE
Payer: MEDICAID

## 2024-08-25 VITALS
HEART RATE: 77 BPM | HEIGHT: 65 IN | WEIGHT: 159 LBS | RESPIRATION RATE: 18 BRPM | BODY MASS INDEX: 26.49 KG/M2 | TEMPERATURE: 99 F | DIASTOLIC BLOOD PRESSURE: 34 MMHG | OXYGEN SATURATION: 97 % | SYSTOLIC BLOOD PRESSURE: 106 MMHG

## 2024-08-25 DIAGNOSIS — J06.9 VIRAL URI WITH COUGH: Primary | ICD-10-CM

## 2024-08-25 PROCEDURE — 87636 SARSCOV2 & INF A&B AMP PRB: CPT | Performed by: EMERGENCY MEDICINE

## 2024-08-25 PROCEDURE — G0463 HOSPITAL OUTPT CLINIC VISIT: HCPCS

## 2024-08-25 PROCEDURE — 99213 OFFICE O/P EST LOW 20 MIN: CPT

## 2024-08-25 RX ORDER — BENZONATATE 100 MG/1
100 CAPSULE ORAL 3 TIMES DAILY PRN
Qty: 12 CAPSULE | Refills: 0 | Status: SHIPPED | OUTPATIENT
Start: 2024-08-25

## 2024-08-25 NOTE — DISCHARGE INSTRUCTIONS
Thank you for letting us care for you today.  You can use Tylenol and ibuprofen as needed for pain and fever.  Drink plenty fluids and get rest.  You can use over-the-counter medications as needed for your symptoms.  Follow-up with your primary care provider.  Return for any new or worsening symptoms.      Wash/sanitize common household surfaces with antibacterial wipes.  Especially door knobs, light switches. Change bed linens and wash bath towels/washcloths. Frequent handwashing. Cough/sneeze into your sleeve. Treat fever every 6-8 hours with adult/children Tylenol (generic acetaminophen)

## 2024-08-25 NOTE — FSED PROVIDER NOTE
[facility]    EMERGENCY [facility]    Room Number:  10/10  Date seen:  8/25/2024  Time seen: 13:42 EDT  PCP: Gokul Santos MD  Historian: Patient    HPI:  Chief complaint:cough   Context:Ani Fields is a 29 y.o. female who presents to the ED with c/o cough.  Patient reports that she has been coughing for the last several days.  She reports that she is coughing up really thick mucus.  She reports that her son has been sick with similar symptoms recently.  She denies any fever, chest pain, shortness of breath.  Patient is nontoxic in appearance.    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/08/2022    Bacterial endocarditis 08/22/2022    Disorder involving thrombocytopenia 06/24/2022    Endocarditis 07/02/2022    S/P TVR (tricuspid valve replacement) 08/22/2022    Pacemaker 11/22/2023    Pregnancy 01/25/2024    Syphilis in pregnancy, antepartum 01/27/2024    Vaginal delivery 01/27/2024     Resolved Ambulatory Problems     Diagnosis Date Noted    No Resolved Ambulatory Problems     Past Medical History:   Diagnosis Date    ADHD     Anxiety     Asymptomatic bacteriuria     Bipolar 1 disorder     Candida esophagitis     Depression     Hepatitis C     Hypoalbuminemia     Hypomagnesemia     IV drug user     Mycotic aneurysm     Normocytic anemia     Thrombocytopenia        PAST SURGICAL HISTORY  Past Surgical History:   Procedure Laterality Date     PACEMAKER IMPLANTATION         FAMILY HISTORY  Family History   Problem Relation Age of Onset    No Known Problems Mother     No Known Problems Father        SOCIAL HISTORY  Social History     Socioeconomic History    Marital status: Single    Number of children: 1   Tobacco Use    Smoking status: Every Day     Current packs/day: 2.00     Average packs/day: 0.6 packs/day for 13.5 years (7.5 ttl pk-yrs)     Types: Cigarettes     Start date: 3/1/2024     Passive exposure: Past    Smokeless tobacco: Never   Vaping Use    Vaping status: Some Days    Start date: 3/19/2024   Substance and Sexual Activity    Alcohol use: Not Currently    Drug use: Not Currently     Types: IV, Marijuana, Methamphetamines     Comment: stopped meth 1/2023    Sexual activity: Yes     Partners: Male     Birth control/protection: None       REVIEW OF SYSTEMS  Review of Systems    All systems reviewed and negative except for those discussed in HPI.     PHYSICAL EXAM    I have reviewed the triage vital signs and nursing notes.    ED Triage Vitals [08/25/24 1248]   Temp Heart Rate Resp BP SpO2   99 °F (37.2 °C) 77 18 (!) 106/34 97 %      Temp src Heart Rate Source Patient Position BP Location FiO2 (%)   Oral Monitor Sitting Right arm --       Physical Exam  Constitutional:       Appearance: Normal appearance. She is not toxic-appearing.   HENT:      Right Ear: Tympanic membrane and ear canal normal.      Left Ear: Tympanic membrane and ear canal normal.      Nose: Nose normal.      Mouth/Throat:      Mouth: Mucous membranes are moist.      Pharynx: Oropharynx is clear.   Eyes:      Extraocular Movements: Extraocular movements intact.      Pupils: Pupils are equal, round, and reactive to light.   Cardiovascular:      Rate and Rhythm: Normal rate and regular rhythm.      Pulses: Normal pulses.      Heart sounds: Normal heart sounds.   Pulmonary:      Effort: Pulmonary effort is normal.      Breath sounds: Normal breath sounds.   Musculoskeletal:          General: Normal range of motion.   Skin:     General: Skin is warm.   Neurological:      General: No focal deficit present.      Mental Status: She is alert.   Psychiatric:         Mood and Affect: Mood normal.         Behavior: Behavior normal.         Vital signs and nursing notes reviewed.        LAB RESULTS  Recent Results (from the past 24 hour(s))   COVID-19 and FLU A/B PCR, 1 HR TAT - Swab, Nasopharynx    Collection Time: 08/25/24 12:49 PM    Specimen: Nasopharynx; Swab   Result Value Ref Range    COVID19 Not Detected Not Detected - Ref. Range    Influenza A PCR Not Detected Not Detected    Influenza B PCR Not Detected Not Detected       Ordered the above labs and independently reviewed the results.      RADIOLOGY RESULTS  No Radiology Exams Resulted Within Past 24 Hours       I ordered the above noted radiological studies. Independently reviewed by me and discussed with radiologist.  See dictation above for official radiology interpretation.      Orders placed during this visit:  Orders Placed This Encounter   Procedures    COVID-19 and FLU A/B PCR, 1 HR TAT - Swab, Nasopharynx           PROCEDURES    Procedures        MEDICATIONS GIVEN IN ER    Medications - No data to display      PROGRESS, DATA ANALYSIS, CONSULTS, AND MEDICAL DECISION MAKING    All labs and radiology studies have been independently reviewed by me.     ED Course as of 08/25/24 1801   Sun Aug 25, 2024   1322 COVID19: Not Detected [KJ]   1322 Influenza A PCR: Not Detected [KJ]   1322 Influenza B PCR: Not Detected [KJ]      ED Course User Index  [KJ] Zakia Clark APRN       AS OF 18:01 EDT VITALS:    BP - (!) 106/34  HR - 77  TEMP - 99 °F (37.2 °C) (Oral)  02 SATS - 97%    Medical Decision Making  MEDICAL DECISION  Patient is a 29-year-old female who presents today with cough.  Symptoms suspicious for likely viral upper respiratory infection. Differential includes bacterial pneumonia, sinusitis, allergic rhinitis, COVID,  influenza. Do not suspect underlying cardiopulmonary process. I considered, but think unlikely, dangerous causes of this patient´s symptoms to include pneumonia, pneumothorax. Patient is nontoxic appearing and not in need of emergent medical intervention.  I do think the patient's symptoms are likely viral in nature.  I did send her home with a prescription for Tessalon Perles.  We discussed discharge instructions.  Patient will follow-up with her primary care provider as needed.  She was given return precautions with understanding.      Problems Addressed:  Viral URI with cough: complicated acute illness or injury    Amount and/or Complexity of Data Reviewed  Labs:  Decision-making details documented in ED Course.    Risk  Prescription drug management.          DIAGNOSIS  Final diagnoses:   Viral URI with cough       New Medications Ordered This Visit   Medications    benzonatate (TESSALON) 100 MG capsule     Sig: Take 1 capsule by mouth 3 (Three) Times a Day As Needed for Cough.     Dispense:  12 capsule     Refill:  0           I performed hand hygiene on entry into the pt room and upon exit.      Part of this note may be an electronic transcription/translation of spoken language to printed text using the Dragon Dictation System.     Appropriate PPE worn during exam.    Dictated utilizing Dragon dictation     Note Disclaimer: At [facility], we believe that sharing information builds trust and better relationships. You are receiving this note because you recently visited [facility]. It is possible you will see health information before a provider has talked with you about it. This kind of information can be easy to misunderstand. To help you fully understand what it means for your health, we urge you to discuss this note with your provider.

## 2024-11-06 PROCEDURE — 93294 REM INTERROG EVL PM/LDLS PM: CPT | Performed by: INTERNAL MEDICINE

## 2024-11-06 PROCEDURE — 93296 REM INTERROG EVL PM/IDS: CPT | Performed by: INTERNAL MEDICINE

## 2025-01-15 ENCOUNTER — TELEPHONE (OUTPATIENT)
Dept: CARDIOLOGY | Facility: CLINIC | Age: 30
End: 2025-01-15
Payer: MEDICAID

## 2025-01-15 NOTE — TELEPHONE ENCOUNTER
Please contact patient and get her worked in sooner for an appointment and device check, at that time if possible. Thank you.        Intermittent pain for one week. Pain lasts about one minute. The area is not painful to touch. No fever or SOB. HR and BP are okay, as far as the patient can tell. Some increased fatigue.

## 2025-01-15 NOTE — TELEPHONE ENCOUNTER
Caller: Ani Fields    Relationship to patient: Self    Best call back number: [phone]    Patient is needing: PT CALLING TO SEE IF HER MARCH DEVICE CHECK APPT CAN BE MOVED TO SOONER. PT STATES SHE HAS BEEN HAVING SOME PAIN NEAR HER PACEMAKER AND NEAR HER HEART. PLEASE CALL TO SCHEDULE OR ADVISE.

## 2025-01-17 ENCOUNTER — OFFICE VISIT (OUTPATIENT)
Dept: CARDIOLOGY | Facility: CLINIC | Age: 30
End: 2025-01-17
Payer: MEDICAID

## 2025-01-17 ENCOUNTER — CLINICAL SUPPORT NO REQUIREMENTS (OUTPATIENT)
Dept: CARDIOLOGY | Facility: CLINIC | Age: 30
End: 2025-01-17
Payer: MEDICAID

## 2025-01-17 VITALS
BODY MASS INDEX: 25.28 KG/M2 | HEIGHT: 65 IN | DIASTOLIC BLOOD PRESSURE: 56 MMHG | SYSTOLIC BLOOD PRESSURE: 110 MMHG | WEIGHT: 151.75 LBS | HEART RATE: 69 BPM | OXYGEN SATURATION: 98 %

## 2025-01-17 DIAGNOSIS — Z95.2 PRESENCE OF PROSTHETIC HEART VALVE: ICD-10-CM

## 2025-01-17 DIAGNOSIS — Z95.0 PACEMAKER: ICD-10-CM

## 2025-01-17 DIAGNOSIS — I49.5 SICK SINUS SYNDROME: Primary | ICD-10-CM

## 2025-01-17 DIAGNOSIS — Z95.0 PACEMAKER: Primary | ICD-10-CM

## 2025-01-17 DIAGNOSIS — Z95.4 S/P TVR (TRICUSPID VALVE REPLACEMENT): ICD-10-CM

## 2025-01-17 DIAGNOSIS — I49.5 SICK SINUS SYNDROME: ICD-10-CM

## 2025-01-17 PROCEDURE — 99213 OFFICE O/P EST LOW 20 MIN: CPT | Performed by: NURSE PRACTITIONER

## 2025-01-17 RX ORDER — IBUPROFEN 800 MG/1
800 TABLET, FILM COATED ORAL EVERY 8 HOURS PRN
COMMUNITY
Start: 2024-11-18

## 2025-01-17 RX ORDER — METOPROLOL SUCCINATE 25 MG/1
25 TABLET, EXTENDED RELEASE ORAL DAILY
Qty: 90 TABLET | Refills: 3 | Status: SHIPPED | OUTPATIENT
Start: 2025-01-17

## 2025-01-17 NOTE — PROGRESS NOTES
Subjective:     Encounter Date:01/17/2025      Patient ID: Ani Fields is a 29 y.o. female.    Chief Complaint: 6 month follow up and device check     History of Present Illness    Ms. 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  Mycotic aneurysm of left MCA  Anemia  Allergy/intolerance to amoxicillin/pcn  ? Every day smoker  Syphilis during pregnancy     Here for 6 month follow up and device check.  Device check is showing intermittent fast rhythms over 140s.  Patient has less than 1% afib burden.  Chronic low blood pressure.  She has occasional atypical chest pain, no palpitations.    She has been sober from all substances and on methadone for 2 years now.       Blood pressure 110/56 HR 69 oxygen 98% on room air weight 151 lbs.      Lab Review:   Labs from 1/25/2024 unremarkable except mildly low magnesium 1.5  1/26/2024 hgb 8.6 post partum       The following portions of the patient's history were reviewed and updated as appropriate: allergies, current medications, past family history, past medical history, past social history, past surgical history, and problem list.    Review of Systems   Constitutional: Negative for malaise/fatigue.   Cardiovascular:  Positive for chest pain. Negative for dyspnea on exertion, leg swelling and palpitations.   Respiratory:  Negative for cough and shortness of breath.    Gastrointestinal:  Negative for abdominal pain, nausea and vomiting.   Neurological:  Negative for dizziness, focal weakness, headaches,  "light-headedness and numbness.   All other systems reviewed and are negative.      Past Medical History:   Diagnosis Date    ADHD     Anxiety     Asymptomatic bacteriuria     Bipolar 1 disorder     Candida esophagitis     Depression     Endocarditis     Hepatitis C     Hypoalbuminemia     Hypomagnesemia     IV drug user     Mycotic aneurysm     Normocytic anemia     secondary to chronic inflammation    Thrombocytopenia     at [facility] - resolved     Past Surgical History:   Procedure Laterality Date    PACEMAKER IMPLANTATION       /56 (BP Location: Right arm, Patient Position: Sitting, Cuff Size: Adult)   Pulse 69   Ht 165.1 cm (65\")   Wt 68.8 kg (151 lb 12 oz)   SpO2 98%   BMI 25.25 kg/m²   Family History   Problem Relation Age of Onset    No Known Problems Mother     No Known Problems Father        Current Outpatient Medications:     acetaminophen (TYLENOL) 325 MG tablet, Take 2 tablets by mouth Every 6 (Six) Hours As Needed for Mild Pain (Second Line: Mild pain unrelieved by ibuprofen. Stagger doses 3 hours after dose of ibuprofen.)., Disp: 30 tablet, Rfl: 1    aspirin 81 MG EC tablet, Take 1 tablet by mouth Daily., Disp: , Rfl:     buprenorphine-naloxone (SUBOXONE) 8-2 MG film film, Place 2 films under the tongue Daily., Disp: , Rfl:     ibuprofen (ADVIL,MOTRIN) 800 MG tablet, Take 1 tablet by mouth Every 8 (Eight) Hours As Needed. Take 1 tablet by mouth every 6-8 hours as needed for pain, Disp: , Rfl:     metoprolol succinate XL (TOPROL-XL) 25 MG 24 hr tablet, Take 1 tablet by mouth Daily., Disp: 90 tablet, Rfl: 3  Allergies   Allergen Reactions    Keflex [Cephalexin] Other (See Comments)     Facial swelling    Amoxicillin Rash     Social History     Socioeconomic History    Marital status: Single    Number of children: 1   Tobacco Use    Smoking status: Every Day     Current packs/day: 2.00     Average packs/day: 0.6 packs/day for 13.9 years (8.3 ttl pk-yrs)     Types: Cigarettes     Start date: 3/1/2024 "     Passive exposure: Current    Smokeless tobacco: Never   Vaping Use    Vaping status: Former    Start date: 3/19/2024    Quit date: 11/17/2024    Passive vaping exposure: Yes   Substance and Sexual Activity    Alcohol use: Not Currently    Drug use: Not Currently     Types: IV, Marijuana, Methamphetamines     Comment: stopped meth 1/2023    Sexual activity: Yes     Partners: Male     Birth control/protection: None                Objective:     Physical Exam  Procedures                  Assessment:     MDM       Diagnosis Plan   1. Sick sinus syndrome        2. Pacemaker        3. Presence of prosthetic heart valve        4. S/P TVR (tricuspid valve replacement)                       Plan:   Chart reviewed   Labs reviewed from 1/2024 advised to recheck with PCP or obgyn regarding hgb   Patient is having intermittent tachycardia on pacemaker check advised to start low dose beta blocker   She is not breast feeding   Will start metoprolol succinate 12.5mg daily and advised to monitor her blood pressure closely as it is already borderline low.     If having any further issues will send back to EP, DR. Caceres.     Keep scheduled appointment and device check in 6 months.     Electronically signed by ALTON Olmstead, 01/25/25, 7:43 PM EST.              This document is intended for medical expert use only.  Reading of this document by patients and/or patient's family without participating medical staff guidance may result in misinterpretation and unintended morbidity. Any interpretation of such data is the responsibility of the patient and/or family member responsible for the patient in concert with their primary or specialist providers, not to be left for sources of online search as such as [employer], [employer] or similar queries.  Relying on these approaches to knowledge may result in misinterpretation, misguided goals of care and even death should patient or family members try recommendations outside of the realm of  professional medical care in a supervised inpatient environment.

## 2025-01-25 PROBLEM — I48.0 PAROXYSMAL A-FIB: Status: ACTIVE | Noted: 2025-01-25

## 2025-08-27 ENCOUNTER — CLINICAL SUPPORT NO REQUIREMENTS (OUTPATIENT)
Dept: CARDIOLOGY | Facility: CLINIC | Age: 30
End: 2025-08-27
Payer: MEDICAID

## 2025-08-27 ENCOUNTER — OFFICE VISIT (OUTPATIENT)
Dept: CARDIOLOGY | Facility: CLINIC | Age: 30
End: 2025-08-27
Payer: MEDICAID

## 2025-08-27 VITALS
HEART RATE: 93 BPM | WEIGHT: 142 LBS | BODY MASS INDEX: 23.66 KG/M2 | SYSTOLIC BLOOD PRESSURE: 98 MMHG | OXYGEN SATURATION: 97 % | DIASTOLIC BLOOD PRESSURE: 53 MMHG | HEIGHT: 65 IN

## 2025-08-27 DIAGNOSIS — Z95.0 PACEMAKER: Primary | ICD-10-CM

## 2025-08-27 DIAGNOSIS — Z95.0 PACEMAKER: ICD-10-CM

## 2025-08-27 DIAGNOSIS — Z95.2 PRESENCE OF PROSTHETIC HEART VALVE: ICD-10-CM

## 2025-08-27 DIAGNOSIS — Z95.4 S/P TVR (TRICUSPID VALVE REPLACEMENT): ICD-10-CM

## 2025-08-27 DIAGNOSIS — I49.5 SICK SINUS SYNDROME: ICD-10-CM

## 2025-08-27 DIAGNOSIS — I49.5 SICK SINUS SYNDROME: Primary | ICD-10-CM

## 2025-08-27 PROCEDURE — 99214 OFFICE O/P EST MOD 30 MIN: CPT | Performed by: NURSE PRACTITIONER

## 2025-08-27 RX ORDER — METOPROLOL SUCCINATE 25 MG/1
25 TABLET, EXTENDED RELEASE ORAL DAILY
Qty: 90 TABLET | Refills: 0 | Status: SHIPPED | OUTPATIENT
Start: 2025-08-27